# Patient Record
Sex: FEMALE | Race: WHITE | NOT HISPANIC OR LATINO | Employment: UNEMPLOYED | URBAN - METROPOLITAN AREA
[De-identification: names, ages, dates, MRNs, and addresses within clinical notes are randomized per-mention and may not be internally consistent; named-entity substitution may affect disease eponyms.]

---

## 2017-02-16 ENCOUNTER — LAB VISIT (OUTPATIENT)
Dept: LAB | Facility: HOSPITAL | Age: 62
End: 2017-02-16
Attending: FAMILY MEDICINE
Payer: MEDICAID

## 2017-02-16 ENCOUNTER — OFFICE VISIT (OUTPATIENT)
Dept: INTERNAL MEDICINE | Facility: CLINIC | Age: 62
End: 2017-02-16
Payer: MEDICAID

## 2017-02-16 VITALS
RESPIRATION RATE: 16 BRPM | OXYGEN SATURATION: 98 % | HEIGHT: 64 IN | WEIGHT: 135.38 LBS | HEART RATE: 68 BPM | SYSTOLIC BLOOD PRESSURE: 166 MMHG | TEMPERATURE: 97 F | DIASTOLIC BLOOD PRESSURE: 88 MMHG | BODY MASS INDEX: 23.11 KG/M2

## 2017-02-16 DIAGNOSIS — M79.671 FOOT PAIN, BILATERAL: ICD-10-CM

## 2017-02-16 DIAGNOSIS — I10 ESSENTIAL HYPERTENSION: ICD-10-CM

## 2017-02-16 DIAGNOSIS — I10 ESSENTIAL HYPERTENSION: Primary | ICD-10-CM

## 2017-02-16 DIAGNOSIS — Z12.39 BREAST CANCER SCREENING: ICD-10-CM

## 2017-02-16 DIAGNOSIS — E78.2 MIXED HYPERLIPIDEMIA: ICD-10-CM

## 2017-02-16 DIAGNOSIS — M79.672 FOOT PAIN, BILATERAL: ICD-10-CM

## 2017-02-16 DIAGNOSIS — Z72.0 TOBACCO ABUSE: ICD-10-CM

## 2017-02-16 DIAGNOSIS — Z12.11 COLON CANCER SCREENING: ICD-10-CM

## 2017-02-16 LAB
ALBUMIN SERPL BCP-MCNC: 4.1 G/DL
ALP SERPL-CCNC: 89 U/L
ALT SERPL W/O P-5'-P-CCNC: 23 U/L
ANION GAP SERPL CALC-SCNC: 10 MMOL/L
AST SERPL-CCNC: 36 U/L
BASOPHILS # BLD AUTO: 0.02 K/UL
BASOPHILS NFR BLD: 0.3 %
BILIRUB SERPL-MCNC: 0.4 MG/DL
BUN SERPL-MCNC: 11 MG/DL
CALCIUM SERPL-MCNC: 9.6 MG/DL
CHLORIDE SERPL-SCNC: 102 MMOL/L
CHOLEST/HDLC SERPL: 5 {RATIO}
CO2 SERPL-SCNC: 27 MMOL/L
CREAT SERPL-MCNC: 0.8 MG/DL
DIFFERENTIAL METHOD: NORMAL
EOSINOPHIL # BLD AUTO: 0.2 K/UL
EOSINOPHIL NFR BLD: 3.2 %
ERYTHROCYTE [DISTWIDTH] IN BLOOD BY AUTOMATED COUNT: 13.3 %
EST. GFR  (AFRICAN AMERICAN): >60 ML/MIN/1.73 M^2
EST. GFR  (NON AFRICAN AMERICAN): >60 ML/MIN/1.73 M^2
GLUCOSE SERPL-MCNC: 95 MG/DL
HCT VFR BLD AUTO: 41.4 %
HDL/CHOLESTEROL RATIO: 20.1 %
HDLC SERPL-MCNC: 288 MG/DL
HDLC SERPL-MCNC: 58 MG/DL
HGB BLD-MCNC: 14.5 G/DL
LDLC SERPL CALC-MCNC: 173.2 MG/DL
LYMPHOCYTES # BLD AUTO: 2.8 K/UL
LYMPHOCYTES NFR BLD: 38.1 %
MCH RBC QN AUTO: 31 PG
MCHC RBC AUTO-ENTMCNC: 35 %
MCV RBC AUTO: 89 FL
MONOCYTES # BLD AUTO: 0.5 K/UL
MONOCYTES NFR BLD: 6.2 %
NEUTROPHILS # BLD AUTO: 3.9 K/UL
NEUTROPHILS NFR BLD: 52.1 %
NONHDLC SERPL-MCNC: 230 MG/DL
PLATELET # BLD AUTO: 212 K/UL
PMV BLD AUTO: 10.2 FL
POTASSIUM SERPL-SCNC: 3.9 MMOL/L
PROT SERPL-MCNC: 8.1 G/DL
RBC # BLD AUTO: 4.67 M/UL
SODIUM SERPL-SCNC: 139 MMOL/L
TRIGL SERPL-MCNC: 284 MG/DL
WBC # BLD AUTO: 7.45 K/UL

## 2017-02-16 PROCEDURE — 83036 HEMOGLOBIN GLYCOSYLATED A1C: CPT

## 2017-02-16 PROCEDURE — 99214 OFFICE O/P EST MOD 30 MIN: CPT | Mod: S$PBB,,, | Performed by: FAMILY MEDICINE

## 2017-02-16 PROCEDURE — 86803 HEPATITIS C AB TEST: CPT

## 2017-02-16 PROCEDURE — 80061 LIPID PANEL: CPT

## 2017-02-16 PROCEDURE — 85025 COMPLETE CBC W/AUTO DIFF WBC: CPT

## 2017-02-16 PROCEDURE — 99999 PR PBB SHADOW E&M-EST. PATIENT-LVL III: CPT | Mod: PBBFAC,,, | Performed by: FAMILY MEDICINE

## 2017-02-16 PROCEDURE — 36415 COLL VENOUS BLD VENIPUNCTURE: CPT | Mod: PO

## 2017-02-16 PROCEDURE — 80053 COMPREHEN METABOLIC PANEL: CPT

## 2017-02-16 RX ORDER — CHOLECALCIFEROL (VITAMIN D3) 25 MCG
1000 TABLET ORAL DAILY
COMMUNITY
End: 2018-02-26

## 2017-02-16 RX ORDER — LANOLIN ALCOHOL/MO/W.PET/CERES
100 CREAM (GRAM) TOPICAL DAILY
COMMUNITY
End: 2018-01-10

## 2017-02-16 RX ORDER — AMLODIPINE AND BENAZEPRIL HYDROCHLORIDE 5; 20 MG/1; MG/1
1 CAPSULE ORAL DAILY
Qty: 90 CAPSULE | Refills: 3 | Status: SHIPPED | OUTPATIENT
Start: 2017-02-16 | End: 2017-08-16

## 2017-02-16 NOTE — PROGRESS NOTES
Chief Complaint:    Chief Complaint   Patient presents with    Annual Exam       History of Present Illness:  She presents to the following complaints,  .  Bilateral foot pain got better when she change her foot wear but is still present.  Her blood pressure is elevated on recheck it continues to stay high.  She has hyperlipidemia significant statin intolerance.    She does smoke some although she's cutting back does not want to go for smoking cessation class nor want to take any medication.      ROS:  Review of Systems   Constitutional: Negative for activity change, chills, fatigue, fever and unexpected weight change.   HENT: Negative for congestion, ear discharge, ear pain, hearing loss, postnasal drip and rhinorrhea.    Eyes: Negative for pain and visual disturbance.   Respiratory: Negative for cough, chest tightness and shortness of breath.    Cardiovascular: Negative for chest pain and palpitations.   Gastrointestinal: Negative for abdominal pain, diarrhea and vomiting.   Endocrine: Negative for heat intolerance.   Genitourinary: Negative for dysuria, flank pain, frequency and hematuria.   Musculoskeletal: Negative for back pain, gait problem and neck pain.   Skin: Negative for color change and rash.   Neurological: Negative for dizziness, tremors, seizures, numbness and headaches.   Psychiatric/Behavioral: Negative for agitation, hallucinations, self-injury, sleep disturbance and suicidal ideas. The patient is not nervous/anxious.        Past Medical History   Diagnosis Date    Arthritis     Hyperlipidemia     Hypertension     Osteoporosis        Social History:  Social History     Social History    Marital status:      Spouse name: N/A    Number of children: N/A    Years of education: N/A     Social History Main Topics    Smoking status: Current Every Day Smoker     Packs/day: 0.50     Years: 45.00     Types: Cigarettes    Smokeless tobacco: Never Used    Alcohol use No    Drug use: No  "   Sexual activity: No     Other Topics Concern    None     Social History Narrative    None       Family History:   family history is not on file.    Health Maintenance   Topic Date Due    Hepatitis C Screening  1955    TETANUS VACCINE  07/19/1975    Pneumococcal PPSV23 (Medium Risk) (1) 07/19/1975    Colonoscopy  07/19/2007    Influenza Vaccine  08/01/2016    Mammogram  12/17/2017    Pap Smear  06/18/2018    Lipid Panel  03/16/2020       Physical Exam:    Vital Signs  Temp: 96.8 °F (36 °C)  Temp src: Tympanic  Pulse: 68  Resp: 16  SpO2: 98 %  BP: (!) 166/88  BP Location: Left arm  BP Method: Manual  Patient Position: Sitting  Pain Score:   7  Height and Weight  Height: 5' 4" (162.6 cm)  Weight: 61.4 kg (135 lb 5.8 oz)  BSA (Calculated - sq m): 1.66 sq meters  BMI (Calculated): 23.3  Weight in (lb) to have BMI = 25: 145.3]    Body mass index is 23.23 kg/(m^2).    Physical Exam   Constitutional: She is oriented to person, place, and time. She appears well-developed.   HENT:   Mouth/Throat: Oropharynx is clear and moist.   Eyes: Conjunctivae are normal. Pupils are equal, round, and reactive to light.   Neck: Normal range of motion. Neck supple.   Cardiovascular: Normal rate, regular rhythm and normal heart sounds.    No murmur heard.  Pulmonary/Chest: Effort normal and breath sounds normal. No respiratory distress. She has no wheezes. She has no rales. She exhibits no tenderness.   Abdominal: Soft. She exhibits no distension and no mass. There is no tenderness. There is no guarding.   Musculoskeletal: She exhibits no edema or tenderness.        Feet:    Lymphadenopathy:     She has no cervical adenopathy.   Neurological: She is alert and oriented to person, place, and time. She has normal reflexes.   Skin: Skin is warm and dry.   Psychiatric: She has a normal mood and affect. Her behavior is normal. Judgment and thought content normal.       Lab Results   Component Value Date    CHOL 238 (H) " 03/16/2015    CHOL 256 (H) 04/08/2011    CHOL 280 (H) 05/11/2010    TRIG 175 (H) 03/16/2015    TRIG 134 04/08/2011    TRIG 99 05/11/2010    HDL 57 03/16/2015    HDL 53 04/08/2011    HDL 51 05/11/2010    TOTALCHOLEST 4.2 03/16/2015    TOTALCHOLEST 4.8 04/08/2011    TOTALCHOLEST 5.5 (H) 05/11/2010    NONHDLCHOL 181 03/16/2015       Lab Results   Component Value Date    HGBA1C 5.1 03/16/2015       Assessment:      ICD-10-CM ICD-9-CM   1. Essential hypertension I10 401.9   2. Mixed hyperlipidemia E78.2 272.2   3. Tobacco abuse Z72.0 305.1   4. Foot pain, bilateral M79.671 729.5    M79.672    5. Colon cancer screening Z12.11 V76.51   6. Breast cancer screening Z12.39 V76.10         Plan:  1.  Hypertension poor control and add amlodipine benazepril please buy a blood pressure machine start monitoring pressure every day bring the machine next visit and start an exercise program-diet is recommended.  2.  Hyperlipidemia we'll look into alternate drugs because of her statin intolerance  3.  Tobaccoism smoking cessation advised patient to work on her own refusing any help from us  4.  Foot pain and refer to podiatry  5.  Schedule a screening mammogram and send another request for colonoscopy.  Follow-up in one month.  Orders Placed This Encounter   Procedures    Mammo Digital Screening Bilat with CAD    CBC auto differential    Comprehensive metabolic panel    Hemoglobin A1c    Lipid panel    Hepatitis C antibody    Ambulatory Referral to Podiatry       Current Outpatient Prescriptions   Medication Sig Dispense Refill    citalopram (CELEXA) 40 MG tablet ONE BY MOUTH EVERY DAY 30 tablet 5    cyanocobalamin (VITAMIN B-12) 1000 MCG tablet Take 100 mcg by mouth once daily.      metoprolol succinate (TOPROL-XL) 25 MG 24 hr tablet TAKE ONE-HALF TABLET BY MOUTH ONCE DAILY 75 tablet 2    potassium chloride (MICRO-K) 10 MEQ CpSR Take 1 capsule (10 mEq total) by mouth once daily. 30 capsule 6     triamterene-hydrochlorothiazide 37.5-25 mg (MAXZIDE-25) 37.5-25 mg per tablet TAKE ONE-HALF TABLET BY MOUTH ONCE DAILY 45 tablet 3    vitamin D 1000 units Tab Take 1,000 mg by mouth once daily.      amlodipine-benazepril 5-20 mg (LOTREL) 5-20 mg per capsule Take 1 capsule by mouth once daily. 90 capsule 3     No current facility-administered medications for this visit.        Medications Discontinued During This Encounter   Medication Reason    nabumetone (RELAFEN) 500 MG tablet Patient no longer taking    SUPREP BOWEL PREP KIT 17.5-3.13-1.6 gram SolR Therapy completed       Return in about 1 month (around 3/16/2017).      Dr Alonso Cortez MD    Disclaimer: This note is prepared using voice recognition system and as such is likely to have errors and is not proof read.

## 2017-02-16 NOTE — MR AVS SNAPSHOT
[facility]  [address] [place] [id number]  Phone: [phone]                  Emily Ordaz   2017 10:00 AM   Office Visit    Description:  Female : 1955   Provider:  Alonso Cortez MD   Department:  [facility]           Reason for Visit     Annual Exam           Diagnoses this Visit        Comments    Essential hypertension    -  Primary     Mixed hyperlipidemia         Tobacco abuse         Foot pain, bilateral         Colon cancer screening         Breast cancer screening                To Do List           Future Appointments        Provider Department Dept Phone    2017 10:20 AM [facility] MAM[facility] [phone]    3/13/2017 9:40 AM Cici Jiang DPM [facility] [phone]      Goals (5 Years of Data)     None      Follow-Up and Disposition     Return in about 1 month (around 3/16/2017).    Follow-up and Disposition History       These Medications        Disp Refills Start End    amlodipine-benazepril 5-20 mg (LOTREL) 5-20 mg per capsule 90 capsule 3 2017    Take 1 capsule by mouth once daily. - Oral    Pharmacy: [facility], [address] #: [phone]         [facility]     [facility] -  Assistance  Registered nurses in the [facility] provide clinical advisement, health education, appointment booking, and other advisory services.  Call for this free service at [phone].             Medications           Message regarding Medications     Verify the changes and/or additions to your medication regime listed below are the same as discussed with your clinician today.  If any of these changes or additions are incorrect, please notify your healthcare provider.        START taking these NEW medications        Refills    amlodipine-benazepril 5-20 mg (LOTREL) 5-20 mg per capsule 3    Sig: Take 1  "capsule by mouth once daily.    Class: Normal    Route: Oral      STOP taking these medications     nabumetone (RELAFEN) 500 MG tablet Take 1 tablet (500 mg total) by mouth 2 (two) times daily.    SUPREP BOWEL PREP KIT 17.5-3.13-1.6 gram SolR Use as directed           Verify that the below list of medications is an accurate representation of the medications you are currently taking.  If none reported, the list may be blank. If incorrect, please contact your healthcare provider. Carry this list with you in case of emergency.           Current Medications     citalopram (CELEXA) 40 MG tablet ONE BY MOUTH EVERY DAY    cyanocobalamin (VITAMIN B-12) 1000 MCG tablet Take 100 mcg by mouth once daily.    metoprolol succinate (TOPROL-XL) 25 MG 24 hr tablet TAKE ONE-HALF TABLET BY MOUTH ONCE DAILY    potassium chloride (MICRO-K) 10 MEQ CpSR Take 1 capsule (10 mEq total) by mouth once daily.    triamterene-hydrochlorothiazide 37.5-25 mg (MAXZIDE-25) 37.5-25 mg per tablet TAKE ONE-HALF TABLET BY MOUTH ONCE DAILY    vitamin D 1000 units Tab Take 1,000 mg by mouth once daily.    amlodipine-benazepril 5-20 mg (LOTREL) 5-20 mg per capsule Take 1 capsule by mouth once daily.           Clinical Reference Information           Your Vitals Were     BP Pulse Temp Resp Height Weight    166/88 (BP Location: Left arm, Patient Position: Sitting, BP Method: Manual) 68 96.8 °F (36 °C) (Tympanic) 16 5' 4" (1.626 m) 61.4 kg (135 lb 5.8 oz)    Last Period SpO2 BMI          03/16/1997 (LMP Unknown) 98% 23.23 kg/m2        Blood Pressure          Most Recent Value    BP  (!)  166/88      Allergies as of 2/16/2017     Statins-hmg-coa Reductase Inhibitors      Immunizations Administered on Date of Encounter - 2/16/2017     None      Orders Placed During Today's Visit      Normal Orders This Visit    Ambulatory Referral to Podiatry     Case request GI: COLONOSCOPY     Future Labs/Procedures Expected by Expires    CBC auto differential  2/16/2017 " (Approximate) 3/18/2017    Comprehensive metabolic panel  2/16/2017 (Approximate) 4/17/2018    Hemoglobin A1c  2/16/2017 (Approximate) 4/17/2018    Hepatitis C antibody  2/16/2017 4/17/2018    Lipid panel  2/16/2017 (Approximate) 4/17/2017    Mammo Digital Screening Bilat with CAD  2/16/2017 (Approximate) 4/18/2018      Smoking Cessation     If you would like to quit smoking:   You may be eligible for free services if you are a [place] resident and started smoking cigarettes before September 1, 1988.  Call the Smoking Cessation [employer] (SCT) toll free at (369) [phone] or (162) 874-6839.   Call 3-800-QUIT-NOW if you do not meet the above criteria.            Language Assistance Services     ATTENTION: Language assistance services are available, free of charge. Please call [phone].      ATENCIÓN: Si habla español, tiene a boston disposición servicios gratuitos de asistencia lingüística. Llame al [phone].     CHÚ Ý: N?u b?n nói Ti?ng Vi?t, có các d?ch v? h? tr? ngôn ng? mi?n phí dành cho b?n. G?i s? [phone].         [facility] complies with applicable Federal civil rights laws and does not discriminate on the basis of race, color, national origin, age, disability, or sex.

## 2017-02-17 LAB
ESTIMATED AVG GLUCOSE: 100 MG/DL
HBA1C MFR BLD HPLC: 5.1 %
HCV AB SERPL QL IA: NEGATIVE

## 2017-02-28 RX ORDER — POTASSIUM CHLORIDE 750 MG/1
CAPSULE, EXTENDED RELEASE ORAL
Qty: 30 CAPSULE | Refills: 3 | Status: SHIPPED | OUTPATIENT
Start: 2017-02-28 | End: 2017-06-29 | Stop reason: SDUPTHER

## 2017-03-20 DIAGNOSIS — M79.671 BILATERAL FOOT PAIN: Primary | ICD-10-CM

## 2017-03-20 DIAGNOSIS — M79.672 BILATERAL FOOT PAIN: Primary | ICD-10-CM

## 2017-03-21 ENCOUNTER — OFFICE VISIT (OUTPATIENT)
Dept: PODIATRY | Facility: CLINIC | Age: 62
End: 2017-03-21
Payer: MEDICAID

## 2017-03-21 ENCOUNTER — HOSPITAL ENCOUNTER (OUTPATIENT)
Dept: RADIOLOGY | Facility: HOSPITAL | Age: 62
Discharge: HOME | End: 2017-03-21
Attending: PODIATRIST
Payer: MEDICAID

## 2017-03-21 VITALS
DIASTOLIC BLOOD PRESSURE: 63 MMHG | HEIGHT: 64 IN | SYSTOLIC BLOOD PRESSURE: 115 MMHG | HEART RATE: 76 BPM | BODY MASS INDEX: 22.47 KG/M2 | WEIGHT: 131.63 LBS

## 2017-03-21 DIAGNOSIS — M79.671 BILATERAL FOOT PAIN: ICD-10-CM

## 2017-03-21 DIAGNOSIS — M79.672 BILATERAL FOOT PAIN: ICD-10-CM

## 2017-03-21 DIAGNOSIS — M79.671 RIGHT FOOT PAIN: ICD-10-CM

## 2017-03-21 DIAGNOSIS — M19.079 ARTHRITIS, MIDFOOT: Primary | ICD-10-CM

## 2017-03-21 PROCEDURE — 99213 OFFICE O/P EST LOW 20 MIN: CPT | Mod: PBBFAC,PO | Performed by: PODIATRIST

## 2017-03-21 PROCEDURE — 99203 OFFICE O/P NEW LOW 30 MIN: CPT | Mod: S$PBB,,, | Performed by: PODIATRIST

## 2017-03-21 PROCEDURE — 73630 X-RAY EXAM OF FOOT: CPT | Mod: 26,50,, | Performed by: RADIOLOGY

## 2017-03-21 PROCEDURE — 99999 PR PBB SHADOW E&M-EST. PATIENT-LVL III: CPT | Mod: PBBFAC,,, | Performed by: PODIATRIST

## 2017-03-21 RX ORDER — METHYLPREDNISOLONE 4 MG/1
TABLET ORAL
Qty: 1 PACKAGE | Refills: 0 | Status: SHIPPED | OUTPATIENT
Start: 2017-03-21 | End: 2017-04-11

## 2017-03-21 NOTE — LETTER
March 21, 2017      Alonso Cortez MD  [id number] [address] [place] [address]           [facility]  [address] [place] [address]  Phone: [phone]  Fax: [phone]          Patient: Emily Ordaz   MR Number: [id number]   YOB: 1955   Date of Visit: 3/21/2017       Dear Dr. Alonso Cortez:    Thank you for referring Emily Ordaz to me for evaluation. Attached you will find relevant portions of my assessment and plan of care.    If you have questions, please do not hesitate to call me. I look forward to following Emily Ordaz along with you.    Sincerely,    Shirley Henry DPM    Enclosure  CC:  No Recipients    If you would like to receive this communication electronically, please contact [email or web address] or [phone] to request more information on [employer] Link access.    For providers and/or their staff who would like to refer a patient to [facility], please contact us through our one-stop-shop provider referral line, [facility], at [phone].    If you feel you have received this communication in error or would no longer like to receive these types of communications, please e-mail [email or web address]

## 2017-03-21 NOTE — PROGRESS NOTES
PODIATRY NOTE  PCP: Dr. Alonso Cortez MD    CHIEF COMPLAINT   Chief Complaint   Patient presents with    Ankle Pain     Bilateral ankle pain. Right ankle is worse than left typically during ambulation       HPI  Emily Ordaz is a 61 y.o. female who has a past medical history of Arthritis; Hyperlipidemia; Hypertension; and Osteoporosis.   Emily presents to clinic today complaining of right ankle pain    Patient describes pain as:   Location: pt points around entire ankle, RIGHT and points at navicular bone  Quality: achy  Severity:10/10  Duration: several years  Modifying Factors (Aggravating): walking, weight bearing, coming down stairs.  Modifying Factors (Alleviating): aleve, non weight bearing    She states this is a chronic problem. She has seen Dr. Negrete in the past for this ankle pain. She states pain is primarily with ambulation. She wears tennis shoes to help with the pain.    Patient denies other pedal complaints at this time.      PMH  Past Medical History:   Diagnosis Date    Arthritis     Hyperlipidemia     Hypertension     Osteoporosis        PROBLEM LIST  Patient Active Problem List    Diagnosis Date Noted    HTN (hypertension) 03/16/2015    Hyperlipidemia 03/16/2015    Statin intolerance 03/16/2015       MEDS  Current Outpatient Prescriptions on File Prior to Visit   Medication Sig Dispense Refill    citalopram (CELEXA) 40 MG tablet ONE BY MOUTH EVERY DAY 30 tablet 5    cyanocobalamin (VITAMIN B-12) 1000 MCG tablet Take 100 mcg by mouth once daily.      metoprolol succinate (TOPROL-XL) 25 MG 24 hr tablet TAKE ONE-HALF TABLET BY MOUTH ONCE DAILY 75 tablet 2    potassium chloride (MICRO-K) 10 MEQ CpSR TAKE ONE CAPSULE BY MOUTH EVERY DAY 30 capsule 3    triamterene-hydrochlorothiazide 37.5-25 mg (MAXZIDE-25) 37.5-25 mg per tablet TAKE ONE-HALF TABLET BY MOUTH ONCE DAILY 45 tablet 3    vitamin D 1000 units Tab Take 1,000 mg by mouth once daily.      amlodipine-benazepril 5-20 mg  "(LOTREL) 5-20 mg per capsule Take 1 capsule by mouth once daily. 90 capsule 3     No current facility-administered medications on file prior to visit.        Medication List with Changes/Refills   New Medications    METHYLPREDNISOLONE (MEDROL DOSEPACK) 4 MG TABLET    use as directed   Current Medications    AMLODIPINE-BENAZEPRIL 5-20 MG (LOTREL) 5-20 MG PER CAPSULE    Take 1 capsule by mouth once daily.    CITALOPRAM (CELEXA) 40 MG TABLET    ONE BY MOUTH EVERY DAY    CYANOCOBALAMIN (VITAMIN B-12) 1000 MCG TABLET    Take 100 mcg by mouth once daily.    METOPROLOL SUCCINATE (TOPROL-XL) 25 MG 24 HR TABLET    TAKE ONE-HALF TABLET BY MOUTH ONCE DAILY    POTASSIUM CHLORIDE (MICRO-K) 10 MEQ CPSR    TAKE ONE CAPSULE BY MOUTH EVERY DAY    TRIAMTERENE-HYDROCHLOROTHIAZIDE 37.5-25 MG (MAXZIDE-25) 37.5-25 MG PER TABLET    TAKE ONE-HALF TABLET BY MOUTH ONCE DAILY    VITAMIN D 1000 UNITS TAB    Take 1,000 mg by mouth once daily.       PSH   History reviewed. No pertinent surgical history.     ALL  Review of patient's allergies indicates:   Allergen Reactions    Statins-hmg-coa reductase inhibitors      Leg cramps and pain        SOC     Social History   Substance Use Topics    Smoking status: Current Every Day Smoker     Packs/day: 0.50     Years: 45.00     Types: Cigarettes    Smokeless tobacco: Never Used    Alcohol use No         FAMILY HX  History reviewed. No pertinent family history.         REVIEW OF SYSTEMS  General: Denies any fever or chills  Chest: Denies shortness of breath, wheezing, coughing, or sputum production  Heart: Denies chest pain, cold extremities, orthopenia, or reduced exercise tolerance  As noted above and per history of current illness above, otherwise negative in the remainder of the 14 systems.     PHYSICAL EXAM  Vitals:    03/21/17 0953   BP: 115/63   Pulse: 76   Weight: 59.7 kg (131 lb 9.8 oz)   Height: 5' 4" (1.626 m)   PainSc: 0-No pain       General: This patient is well-developed, " well-nourished and appears stated age, well-oriented to person, place and time, and cooperative and pleasant on today's visit      LOWER EXTREMITY  Vascular exam:   · Dorsalis pedis and posterior tibial pulses palpable 2/4 bilaterally.   · Capillary refill time immediate to the toes.   · Feet are warm to the touch. Skin temperature warm to warm from proximally to distally   · There are varicosities, telangiectasias noted to bilateral foot and ankle regions.   · There are no ecchymoses noted to bilateral foot and ankle regions.   · There is no gross lower extremity edema.    Dermatologic exam:   · Skin moist with healthy texture and turgor.  · There are no open ulcerations, lacerations, or fissures to bilateral foot and ankle regions. There are no signs of infection as there is no erythema, no proximal-extending lymphangiitis, no fluctuance, or crepitus noted on palpation to bilateral foot and ankle regions.   · There is no interdigital maceration.   · There are no hyperkeratotic lesions noted to feet. Nails are well-trimmed.    Neurologic exam:  · Epicritic sensation is intact as the patient is able to sense light touch to bilateral foot and ankle regions.   · Achilles and patellar deep tendon reflexes intact  · Babinski reflex absent    Musculoskeletal/Orthopedic exam:   · No symptomatic structural abnormalities noted  · There is PAIN with palpation of navicular tuberosity and with palpation of medial malleolus RIGHT   · Muscle strength AT/EHL/EDL/PT: 5/5; Achilles/Gastroc/Soleus: 5/5; PB/PL: 5/5 Muscle tone is normal.  · Ankle joint ROM  B/L supple DF/PF, non-crepitus  · STJ ROM supple inv/ev, non crepitus   · MTPJ b/l supple DF/PF, non crepitus  · Foot type: pes cavus     IMAGING   Reviewed by me and I agree with radiologist findings, 3 views of foot/ankle, reveal:  Results for orders placed during the hospital encounter of 03/21/17   X-Ray Foot Complete Bilateral    Narrative Bilateral feet three  views.    Findings: There is diffuse bony demineralization.  Mild degenerative changes present in the mid foot region bilaterally.  Small dorsal calcaneal spur on the left.  No periarticular erosions.  No acute fracture or dislocation.    Impression  As above.      Electronically signed by: DARWIN BENTON MD  Date:     03/21/17  Time:    09:28            Results for orders placed during the hospital encounter of 03/21/17   X-Ray Foot Complete Bilateral    Narrative Bilateral feet three views.    Findings: There is diffuse bony demineralization.  Mild degenerative changes present in the mid foot region bilaterally.  Small dorsal calcaneal spur on the left.  No periarticular erosions.  No acute fracture or dislocation.    Impression  As above.      Electronically signed by: DARWIN BENTON MD  Date:     03/21/17  Time:    09:28        No results found for this or any previous visit.     No results found for this or any previous visit.        ASSESSMENT  Arthritis, midfoot    Right foot pain    Other orders  -     methylPREDNISolone (MEDROL DOSEPACK) 4 mg tablet; use as directed  Dispense: 1 Package; Refill: 0      PLAN    1. Patient was educated about clinical and imaging findings, and verbalizes understanding of above.  2. Treatment plan: Discussed xray findings which I reviewed and went over with the patient. Clinically pain stems from medial aspect of foot around navicular and symptoms worse with ambulation therefore first treatment goal aimed at accommodating pedal deformity during ambulation. Rx for inserts and shoe recommendation provided. Medrol dosepak Rx for inflammation  -Prescription for musculoskeletal pain prescribed for patient consisting of: Flurbiprofen 10%, cyclobenzaprine 2%,gabapentin 6%, lidocaine 2%, and prilocaine 2%.    Arthritis: diclofenac2%, amitriptyline 2%, lidocaine 2%, and prilocaine 2%.      3. RTC  for follow up/evaluation as scheduled       Future Appointments  Date Time Provider  Department Center   4/18/2017 9:40 AM Shirley Henry DPM [facility] POD [facility]       Report Electronically Signed By:  Shirley Henry DPM   Podiatric Medicine & Surgery  [facility]  3/21/2017

## 2017-03-21 NOTE — MR AVS SNAPSHOT
[facility]  [address] [facility] [place] [address]  Phone: [phone]  Fax: [phone]                  Emily Ordaz   3/21/2017 9:40 AM   Office Visit    Description:  Female : 1955   Provider:  Shirley Henry DPM   Department:  [facility]           Reason for Visit     Ankle Pain           Diagnoses this Visit        Comments    Arthritis, midfoot    -  Primary     Right foot pain                To Do List           Future Appointments        Provider Department Dept Phone    2017 9:40 AM Shirley Henry DPM [facility] [phone]      Goals (5 Years of Data)     None       These Medications        Disp Refills Start End    methylPREDNISolone (MEDROL DOSEPACK) 4 mg tablet 1 Package 0 3/21/2017 2017    use as directed    Pharmacy: [facility] [address] #: [phone]         [facility] On Call     [facility] On Call Nurse Care Line -  Assistance  Registered nurses in the [facility] On Call Center provide clinical advisement, health education, appointment booking, and other advisory services.  Call for this free service at [phone].             Medications           Message regarding Medications     Verify the changes and/or additions to your medication regime listed below are the same as discussed with your clinician today.  If any of these changes or additions are incorrect, please notify your healthcare provider.        START taking these NEW medications        Refills    methylPREDNISolone (MEDROL DOSEPACK) 4 mg tablet 0    Sig: use as directed    Class: Normal           Verify that the below list of medications is an accurate representation of the medications you are currently taking.  If none reported, the list may be blank. If incorrect, please contact your healthcare provider. Carry this list with you in case of emergency.           Current Medications     citalopram (CELEXA) 40 MG tablet ONE BY MOUTH  "EVERY DAY    cyanocobalamin (VITAMIN B-12) 1000 MCG tablet Take 100 mcg by mouth once daily.    metoprolol succinate (TOPROL-XL) 25 MG 24 hr tablet TAKE ONE-HALF TABLET BY MOUTH ONCE DAILY    potassium chloride (MICRO-K) 10 MEQ CpSR TAKE ONE CAPSULE BY MOUTH EVERY DAY    triamterene-hydrochlorothiazide 37.5-25 mg (MAXZIDE-25) 37.5-25 mg per tablet TAKE ONE-HALF TABLET BY MOUTH ONCE DAILY    vitamin D 1000 units Tab Take 1,000 mg by mouth once daily.    amlodipine-benazepril 5-20 mg (LOTREL) 5-20 mg per capsule Take 1 capsule by mouth once daily.    methylPREDNISolone (MEDROL DOSEPACK) 4 mg tablet use as directed           Clinical Reference Information           Your Vitals Were     BP Pulse Height Weight Last Period BMI    115/63 (BP Location: Right arm, Patient Position: Sitting, BP Method: Automatic) 76 5' 4" (1.626 m) 59.7 kg (131 lb 9.8 oz) 03/16/1997 (LMP Unknown) 22.59 kg/m2      Blood Pressure          Most Recent Value    BP  115/63      Allergies as of 3/21/2017     Statins-hmg-coa Reductase Inhibitors      Immunizations Administered on Date of Encounter - 3/21/2017     None      Smoking Cessation     If you would like to quit smoking:   You may be eligible for free services if you are a [place] resident and started smoking cigarettes before September 1, 1988.  Call the Smoking Cessation Trust ([facility]) toll free at ([phone]) [phone] or ([phone].   Call 1-800-QUIT-NOW if you do not meet the above criteria.            Language Assistance Services     ATTENTION: Language assistance services are available, free of charge. Please call [phone].      ATENCIÓN: Si habla español, tiene a boston disposición servicios gratuitos de asistencia lingüística. Llame al [phone].     YULIANA Ý: N?u b?n nói Ti?ng Vi?t, có các d?ch v? h? tr? ngôn ng? mi?n phí dành cho b?n. G?i s? [phone].         [facility] complies with applicable Federal civil rights laws and does not discriminate on the basis of race, " color, national origin, age, disability, or sex.

## 2017-06-12 RX ORDER — CITALOPRAM 40 MG/1
TABLET, FILM COATED ORAL
Qty: 30 TABLET | Refills: 4 | Status: SHIPPED | OUTPATIENT
Start: 2017-06-12 | End: 2017-11-13 | Stop reason: SDUPTHER

## 2017-06-29 RX ORDER — POTASSIUM CHLORIDE 750 MG/1
CAPSULE, EXTENDED RELEASE ORAL
Qty: 30 CAPSULE | Refills: 0 | Status: SHIPPED | OUTPATIENT
Start: 2017-06-29 | End: 2017-08-16 | Stop reason: SDUPTHER

## 2017-07-14 RX ORDER — TRIAMTERENE/HYDROCHLOROTHIAZID 37.5-25 MG
TABLET ORAL
Qty: 45 TABLET | Refills: 0 | Status: SHIPPED | OUTPATIENT
Start: 2017-07-14 | End: 2017-08-16 | Stop reason: SDUPTHER

## 2017-07-19 RX ORDER — METOPROLOL SUCCINATE 25 MG/1
TABLET, EXTENDED RELEASE ORAL
Qty: 75 TABLET | Refills: 2 | Status: SHIPPED | OUTPATIENT
Start: 2017-07-19 | End: 2018-07-25 | Stop reason: SDUPTHER

## 2017-08-16 ENCOUNTER — OFFICE VISIT (OUTPATIENT)
Dept: FAMILY MEDICINE | Facility: CLINIC | Age: 62
End: 2017-08-16
Payer: MEDICAID

## 2017-08-16 VITALS
HEART RATE: 82 BPM | HEIGHT: 64 IN | BODY MASS INDEX: 22.85 KG/M2 | TEMPERATURE: 98 F | OXYGEN SATURATION: 96 % | SYSTOLIC BLOOD PRESSURE: 112 MMHG | DIASTOLIC BLOOD PRESSURE: 70 MMHG | WEIGHT: 133.81 LBS

## 2017-08-16 DIAGNOSIS — I10 ESSENTIAL HYPERTENSION: Primary | ICD-10-CM

## 2017-08-16 DIAGNOSIS — Z12.11 COLON CANCER SCREENING: ICD-10-CM

## 2017-08-16 DIAGNOSIS — E78.2 MIXED HYPERLIPIDEMIA: ICD-10-CM

## 2017-08-16 DIAGNOSIS — Z72.0 TOBACCO ABUSE: ICD-10-CM

## 2017-08-16 DIAGNOSIS — Z78.9 STATIN INTOLERANCE: ICD-10-CM

## 2017-08-16 PROCEDURE — 99214 OFFICE O/P EST MOD 30 MIN: CPT | Mod: S$PBB,,, | Performed by: FAMILY MEDICINE

## 2017-08-16 PROCEDURE — 99213 OFFICE O/P EST LOW 20 MIN: CPT | Mod: PBBFAC,PO | Performed by: FAMILY MEDICINE

## 2017-08-16 PROCEDURE — 3008F BODY MASS INDEX DOCD: CPT | Mod: ,,, | Performed by: FAMILY MEDICINE

## 2017-08-16 PROCEDURE — 3078F DIAST BP <80 MM HG: CPT | Mod: ,,, | Performed by: FAMILY MEDICINE

## 2017-08-16 PROCEDURE — 3074F SYST BP LT 130 MM HG: CPT | Mod: ,,, | Performed by: FAMILY MEDICINE

## 2017-08-16 PROCEDURE — 99999 PR PBB SHADOW E&M-EST. PATIENT-LVL III: CPT | Mod: PBBFAC,,, | Performed by: FAMILY MEDICINE

## 2017-08-16 RX ORDER — POTASSIUM CHLORIDE 750 MG/1
10 CAPSULE, EXTENDED RELEASE ORAL DAILY
Qty: 30 CAPSULE | Refills: 6 | Status: SHIPPED | OUTPATIENT
Start: 2017-08-16 | End: 2018-03-10 | Stop reason: SDUPTHER

## 2017-08-16 RX ORDER — TRIAMTERENE/HYDROCHLOROTHIAZID 37.5-25 MG
0.5 TABLET ORAL DAILY
Qty: 45 TABLET | Refills: 6 | Status: SHIPPED | OUTPATIENT
Start: 2017-08-16 | End: 2018-08-24 | Stop reason: SDUPTHER

## 2017-08-16 NOTE — PROGRESS NOTES
Chief Complaint:    Chief Complaint   Patient presents with    Medication Refill       History of Present Illness:  Patient is here for six-month follow-up of chronic medical problems,  She has hypertension which is well-controlled, hyperlipidemia which is high because she has statin intolerance.  She is still smokes discussed the hazards of smoking.  She is willing to go to smoking cessation class.      ROS:  Review of Systems   Constitutional: Negative for activity change, chills, fatigue, fever and unexpected weight change.   HENT: Negative for congestion, ear discharge, ear pain, hearing loss, postnasal drip and rhinorrhea.    Eyes: Negative for pain and visual disturbance.   Respiratory: Negative for cough, chest tightness and shortness of breath.    Cardiovascular: Negative for chest pain and palpitations.   Gastrointestinal: Negative for abdominal pain, diarrhea and vomiting.   Endocrine: Negative for heat intolerance.   Genitourinary: Negative for dysuria, flank pain, frequency and hematuria.   Musculoskeletal: Negative for back pain, gait problem and neck pain.   Skin: Negative for color change and rash.   Neurological: Negative for dizziness, tremors, seizures, numbness and headaches.   Psychiatric/Behavioral: Negative for agitation, hallucinations, self-injury, sleep disturbance and suicidal ideas. The patient is not nervous/anxious.        Past Medical History:   Diagnosis Date    Arthritis     Hyperlipidemia     Hypertension     Osteoporosis        Social History:  Social History     Social History    Marital status:      Spouse name: N/A    Number of children: N/A    Years of education: N/A     Social History Main Topics    Smoking status: Current Every Day Smoker     Packs/day: 0.50     Years: 45.00     Types: Cigarettes    Smokeless tobacco: Never Used    Alcohol use No    Drug use: No    Sexual activity: No     Other Topics Concern    None     Social History Narrative     "None       Family History:   family history is not on file.    Health Maintenance   Topic Date Due    TETANUS VACCINE  07/19/1975    Pneumococcal PPSV23 (Medium Risk) (1) 07/19/1975    Fecal Occult Blood Test (FOBT)/FitKit  07/19/2007    Zoster Vaccine  07/19/2017    Influenza Vaccine  08/01/2017    Mammogram  12/18/2017    Pap Smear with HPV Cotest  06/18/2018    Lipid Panel  02/16/2022    Hepatitis C Screening  Completed       Physical Exam:    Vital Signs  Temp: 97.7 °F (36.5 °C)  Temp src: Tympanic  Pulse: 82  SpO2: 96 %  BP: 112/70  BP Location: Left arm  Patient Position: Sitting  Pain Score: 0-No pain  Height and Weight  Height: 5' 4" (162.6 cm)  Weight: 60.7 kg (133 lb 13.1 oz)  BSA (Calculated - sq m): 1.66 sq meters  BMI (Calculated): 23  Weight in (lb) to have BMI = 25: 145.3]    Body mass index is 22.97 kg/m².    Physical Exam   Constitutional: She is oriented to person, place, and time. She appears well-developed.   HENT:   Mouth/Throat: Oropharynx is clear and moist.   Eyes: Conjunctivae are normal. Pupils are equal, round, and reactive to light.   Neck: Normal range of motion. Neck supple.   Cardiovascular: Normal rate, regular rhythm and normal heart sounds.    No murmur heard.  Pulmonary/Chest: Effort normal and breath sounds normal. No respiratory distress. She has no wheezes. She has no rales. She exhibits no tenderness.   Abdominal: Soft. She exhibits no distension and no mass. There is no tenderness. There is no guarding.   Musculoskeletal: She exhibits no edema or tenderness.   Lymphadenopathy:     She has no cervical adenopathy.   Neurological: She is alert and oriented to person, place, and time. She has normal reflexes.   Skin: Skin is warm and dry.   Psychiatric: She has a normal mood and affect. Her behavior is normal. Judgment and thought content normal.       Lab Results   Component Value Date    CHOL 288 (H) 02/16/2017    CHOL 238 (H) 03/16/2015    CHOL 256 (H) 04/08/2011    " TRIG 284 (H) 02/16/2017    TRIG 175 (H) 03/16/2015    TRIG 134 04/08/2011    HDL 58 02/16/2017    HDL 57 03/16/2015    HDL 53 04/08/2011    TOTALCHOLEST 5.0 02/16/2017    TOTALCHOLEST 4.2 03/16/2015    TOTALCHOLEST 4.8 04/08/2011    NONHDLCHOL 230 02/16/2017    NONHDLCHOL 181 03/16/2015       Lab Results   Component Value Date    HGBA1C 5.1 02/16/2017       Assessment:      ICD-10-CM ICD-9-CM   1. Essential hypertension I10 401.9   2. Mixed hyperlipidemia E78.2 272.2   3. Statin intolerance Z78.9 995.27   4. Colon cancer screening Z12.11 V76.51   5. Tobacco abuse Z72.0 305.1         Plan:  Continue current meds and plan.  She was referred to smoking cessation class.  Depression stable  Recommend screening colonoscopy.  She refused we will do a stool test.  Orders Placed This Encounter   Procedures    Occult Blood Stool, CA Screen    Lipid panel    Hemoglobin A1c    Comprehensive metabolic panel    Ambulatory referral to Smoking Cessation Program       Current Outpatient Prescriptions   Medication Sig Dispense Refill    citalopram (CELEXA) 40 MG tablet TAKE ONE TABLET BY MOUTH EVERY DAY 30 tablet 4    cyanocobalamin (VITAMIN B-12) 1000 MCG tablet Take 100 mcg by mouth once daily.      metoprolol succinate (TOPROL-XL) 25 MG 24 hr tablet TAKE ONE-HALF TABLET BY MOUTH EVERY DAY 75 tablet 2    potassium chloride (MICRO-K) 10 MEQ CpSR Take 1 capsule (10 mEq total) by mouth once daily. 30 capsule 6    triamterene-hydrochlorothiazide 37.5-25 mg (MAXZIDE-25) 37.5-25 mg per tablet Take 0.5 tablets by mouth once daily. 45 tablet 6    vitamin D 1000 units Tab Take 1,000 mg by mouth once daily.       No current facility-administered medications for this visit.        Medications Discontinued During This Encounter   Medication Reason    amlodipine-benazepril 5-20 mg (LOTREL) 5-20 mg per capsule Patient no longer taking    triamterene-hydrochlorothiazide 37.5-25 mg (MAXZIDE-25) 37.5-25 mg per tablet Reorder     potassium chloride (MICRO-K) 10 MEQ CpSR Reorder       Return in about 6 months (around 2/16/2018).      Dr Alonso Cortez MD    Disclaimer: This note is prepared using voice recognition system and as such is likely to have errors and is not proof read.

## 2017-11-14 RX ORDER — CITALOPRAM 40 MG/1
TABLET, FILM COATED ORAL
Qty: 30 TABLET | Refills: 2 | Status: SHIPPED | OUTPATIENT
Start: 2017-11-14 | End: 2018-02-20 | Stop reason: SDUPTHER

## 2018-01-10 ENCOUNTER — OFFICE VISIT (OUTPATIENT)
Dept: URGENT CARE | Facility: CLINIC | Age: 63
End: 2018-01-10
Payer: MEDICAID

## 2018-01-10 ENCOUNTER — HOSPITAL ENCOUNTER (OUTPATIENT)
Dept: RADIOLOGY | Facility: HOSPITAL | Age: 63
Discharge: HOME | End: 2018-01-10
Attending: NURSE PRACTITIONER
Payer: MEDICAID

## 2018-01-10 VITALS
BODY MASS INDEX: 22.55 KG/M2 | DIASTOLIC BLOOD PRESSURE: 64 MMHG | RESPIRATION RATE: 20 BRPM | HEIGHT: 64 IN | OXYGEN SATURATION: 99 % | SYSTOLIC BLOOD PRESSURE: 120 MMHG | HEART RATE: 79 BPM | WEIGHT: 132.06 LBS | TEMPERATURE: 97 F

## 2018-01-10 DIAGNOSIS — R10.9 RIGHT SIDED ABDOMINAL PAIN: Primary | ICD-10-CM

## 2018-01-10 DIAGNOSIS — R10.9 RIGHT SIDED ABDOMINAL PAIN: ICD-10-CM

## 2018-01-10 PROCEDURE — 99214 OFFICE O/P EST MOD 30 MIN: CPT | Mod: PBBFAC,PO | Performed by: NURSE PRACTITIONER

## 2018-01-10 PROCEDURE — 74019 RADEX ABDOMEN 2 VIEWS: CPT | Mod: TC,PO

## 2018-01-10 PROCEDURE — 99214 OFFICE O/P EST MOD 30 MIN: CPT | Mod: S$PBB,,, | Performed by: NURSE PRACTITIONER

## 2018-01-10 PROCEDURE — 99999 PR PBB SHADOW E&M-EST. PATIENT-LVL IV: CPT | Mod: PBBFAC,,, | Performed by: NURSE PRACTITIONER

## 2018-01-10 PROCEDURE — 74019 RADEX ABDOMEN 2 VIEWS: CPT | Mod: 26,,, | Performed by: RADIOLOGY

## 2018-01-10 RX ORDER — IBUPROFEN 800 MG/1
800 TABLET ORAL 2 TIMES DAILY PRN
Qty: 20 TABLET | Refills: 0 | Status: SHIPPED | OUTPATIENT
Start: 2018-01-10 | End: 2018-01-20

## 2018-01-10 NOTE — PATIENT INSTRUCTIONS
Abdominal Pain    Abdominal pain is pain in the stomach or belly area. Everyone has this pain from time to time. In many cases it goes away on its own. But abdominal pain can sometimes be due to a serious problem, such as appendicitis. So its important to know when to seek help.  Causes of abdominal pain  There are many possible causes of abdominal pain. Common causes in adults include:  · Constipation, diarrhea, or gas  · Stomach acid flowing back up into the esophagus (acid reflux or heartburn)  · Severe acid reflux, called GERD (gastroesophageal reflux disease)  · A sore in the lining of the stomach or small intestine (peptic ulcer)  · Inflammation of the gallbladder, liver, or pancreas  · Gallstones or kidney stones  · Appendicitis   · Intestinal blockage   · An internal organ pushing through a muscle or other tissue (hernia)  · Urinary tract infections  · In women, menstrual cramps, fibroids, or endometriosis  · Inflammation or infection of the intestines  Diagnosing the cause of abdominal pain  Your healthcare provider will do a physical exam help find the cause of your pain. If needed, tests will be ordered. Belly pain has many possible causes. So it can be hard to find the reason for your pain. Giving details about your pain can help. Tell your provider where and when you feel the pain, and what makes it better or worse. Also let your provider know if you have other symptoms such as:  · Fever  · Tiredness  · Upset stomach (nausea)  · Vomiting  · Changes in bathroom habits  Treating abdominal pain  Some causes of pain need emergency medical treatment right away. These include appendicitis or a bowel blockage. Other problems can be treated with rest, fluids, or medicines. Your healthcare provider can give you specific instructions for treatment or self-care based on what is causing your pain.  If you have vomiting or diarrhea, sip water or other clear fluids. When you are ready to eat solid foods again,  start with small amounts of easy-to-digest, low-fat foods. These include apple sauce, toast, or crackers.   When to seek medical care  Call 911 or go to the hospital right away if you:  · Cant pass stool and are vomiting  · Are vomiting blood or have bloody diarrhea or black, tarry diarrhea  · Have chest, neck, or shoulder pain  · Feel like you might pass out  · Have pain in your shoulder blades with nausea  · Have sudden, severe belly pain  · Have new, severe pain unlike any you have felt before  · Have a belly that is rigid, hard, and tender to touch  Call your healthcare provider if you have:  · Pain for more than 5 days  · Bloating for more than 2 days  · Diarrhea for more than 5 days  · A fever of 100.4°F (38.0°C) or higher, or as directed by your provider  · Pain that gets worse  · Weight loss for no reason  · Continued lack of appetite  · Blood in your stool  How to prevent abdominal pain  Here are some tips to help prevent abdominal pain:  · Eat smaller amounts of food at one time.  · Avoid greasy, fried, or other high-fat foods.  · Avoid foods that give you gas.  · Exercise regularly.  · Drink plenty of fluids.  To help prevent GERD symptoms:  · Quit smoking.  · Reduce alcohol and certain foods that increase stomach acid.  · Avoid aspirin and over-the-counter pain and fever medicines (NSAIDS or nonsteroidal anti-inflammatory drugs), if possible  · Lose extra weight.  · Finish eating at least 2 hours before you go to bed or lie down.  · Raise the head of your bed.  Date Last Reviewed: 7/1/2016  © 9766-8689 [employer]. [address] [place] [address], [place] [address]. All rights reserved. This information is not intended as a substitute for professional medical care. Always follow your healthcare professional's instructions.

## 2018-01-10 NOTE — PROGRESS NOTES
Subjective:       Patient ID: Emily Ordaz is a 62 y.o. female.    Chief Complaint: Flank Pain (Right side pain, and under right breast)    Patient is here due to intermittent right rib/RUQ pain. Denies injury. She had a URI about a month ago but the symptoms have nearly resolved. No n/v/d. No rash. She reports turning in an awkard position about 1 week ago, which worsened the pain       Flank Pain   This is a new problem. The current episode started more than 1 month ago. Associated symptoms include abdominal pain. Pertinent negatives include no chest pain, dysuria, fever, headaches or weakness.     Review of Systems   Constitutional: Negative for activity change, appetite change, chills, diaphoresis, fatigue and fever.   HENT: Negative for congestion, ear discharge, ear pain, postnasal drip, rhinorrhea, sinus pain, sinus pressure, sneezing and sore throat.    Respiratory: Positive for cough (resolving). Negative for shortness of breath and wheezing.    Cardiovascular: Negative for chest pain and palpitations.   Gastrointestinal: Positive for abdominal pain. Negative for abdominal distention, blood in stool, constipation, diarrhea, nausea and vomiting.   Genitourinary: Positive for flank pain. Negative for difficulty urinating, dysuria, frequency, hematuria and urgency.   Musculoskeletal: Negative for back pain and myalgias.   Neurological: Negative for dizziness, weakness and headaches.       Objective:      Physical Exam   Constitutional: She is oriented to person, place, and time. She appears well-developed and well-nourished.  Non-toxic appearance. She does not have a sickly appearance. She does not appear ill. No distress.   HENT:   Head: Normocephalic.   Right Ear: Tympanic membrane, external ear and ear canal normal.   Left Ear: Tympanic membrane, external ear and ear canal normal.   Nose: Nose normal. No mucosal edema or rhinorrhea. Right sinus exhibits no maxillary sinus tenderness and no frontal  sinus tenderness. Left sinus exhibits no maxillary sinus tenderness and no frontal sinus tenderness.   Mouth/Throat: Uvula is midline, oropharynx is clear and moist and mucous membranes are normal. No oropharyngeal exudate, posterior oropharyngeal edema or posterior oropharyngeal erythema.   Eyes: Conjunctivae and EOM are normal.   Neck: Normal range of motion. Neck supple.   Cardiovascular: Normal rate, regular rhythm and normal heart sounds.    Pulmonary/Chest: Effort normal and breath sounds normal. No accessory muscle usage. No apnea, no tachypnea and no bradypnea. No respiratory distress. She has no decreased breath sounds. She has no wheezes. She has no rhonchi. She has no rales.   Abdominal: Soft. Normal appearance and bowel sounds are normal. She exhibits no distension. There is no tenderness. There is no rigidity, no rebound, no guarding, no CVA tenderness, no tenderness at McBurney's point and negative Quarles's sign.       No pain on palpation; had pain with position change (supine to sitting)   Lymphadenopathy:        Head (right side): No submental, no submandibular and no tonsillar adenopathy present.        Head (left side): No submental, no submandibular and no tonsillar adenopathy present.     She has no cervical adenopathy.   Neurological: She is alert and oriented to person, place, and time.   Skin: Skin is warm and dry. No rash noted. She is not diaphoretic.       Assessment:       1. Right sided abdominal pain        Plan:   Emily was seen today for flank pain.    Diagnoses and all orders for this visit:    Right sided abdominal pain  -     X-Ray Abdomen Flat And Erect; Future  -     ibuprofen (ADVIL,MOTRIN) 800 MG tablet; Take 1 tablet (800 mg total) by mouth 2 (two) times daily as needed for Pain.  -     CBC auto differential; Future  -     Comprehensive metabolic panel; Future  -     H.Pylori Antibody IgG; Future  -     Amylase; Future  -     Lipase; Future        -     Diagnosis and  treatment discussed, AVS provided. Discussed possible causes, appears muscular   -     Try low effective dose of NSAIDs, pt decline muscle relaxer; try ice/heat  -     Follow up with PCP or ER immediately for worsening, new or no improvement of symptoms.   -     Patient understands and agrees with plan

## 2018-01-11 ENCOUNTER — TELEPHONE (OUTPATIENT)
Dept: URGENT CARE | Facility: CLINIC | Age: 63
End: 2018-01-11

## 2018-01-11 NOTE — TELEPHONE ENCOUNTER
Discussed results with patient, advised to try miralax ; if no improvement in a week or worsening see GI. No further questions, she voiced understanding

## 2018-02-15 ENCOUNTER — PATIENT OUTREACH (OUTPATIENT)
Dept: ADMINISTRATIVE | Facility: HOSPITAL | Age: 63
End: 2018-02-15

## 2018-02-15 NOTE — PROGRESS NOTES
I spoke with pt to schedule over due HM: Mammogram. Patient verbalized understanding of appt. Appt slip mailed.

## 2018-02-15 NOTE — PROGRESS NOTES
Pre visit chart audit performed. I spoke with pt to schedule over due HM: Mammogram. Patient verbalized understanding of appt. Appt slip mailed. Pt stated she has lost her FOBT Kit and will ask for another one when she comes in for her labs and will bring it the day she comes for her Dr visit.

## 2018-02-20 RX ORDER — CITALOPRAM 40 MG/1
TABLET, FILM COATED ORAL
Qty: 30 TABLET | Refills: 2 | Status: SHIPPED | OUTPATIENT
Start: 2018-02-20 | End: 2018-02-26

## 2018-02-26 ENCOUNTER — OFFICE VISIT (OUTPATIENT)
Dept: FAMILY MEDICINE | Facility: CLINIC | Age: 63
End: 2018-02-26
Payer: MEDICAID

## 2018-02-26 VITALS
TEMPERATURE: 97 F | OXYGEN SATURATION: 98 % | SYSTOLIC BLOOD PRESSURE: 130 MMHG | HEART RATE: 77 BPM | HEIGHT: 64 IN | DIASTOLIC BLOOD PRESSURE: 70 MMHG | WEIGHT: 135.25 LBS | BODY MASS INDEX: 23.09 KG/M2

## 2018-02-26 DIAGNOSIS — I10 ESSENTIAL HYPERTENSION: Primary | ICD-10-CM

## 2018-02-26 DIAGNOSIS — E78.2 MIXED HYPERLIPIDEMIA: ICD-10-CM

## 2018-02-26 DIAGNOSIS — Z12.11 COLON CANCER SCREENING: ICD-10-CM

## 2018-02-26 DIAGNOSIS — Z78.9 STATIN INTOLERANCE: ICD-10-CM

## 2018-02-26 DIAGNOSIS — R10.13 EPIGASTRIC PAIN: ICD-10-CM

## 2018-02-26 DIAGNOSIS — Z12.39 BREAST CANCER SCREENING: ICD-10-CM

## 2018-02-26 PROCEDURE — 99213 OFFICE O/P EST LOW 20 MIN: CPT | Mod: PBBFAC,PO | Performed by: FAMILY MEDICINE

## 2018-02-26 PROCEDURE — 99214 OFFICE O/P EST MOD 30 MIN: CPT | Mod: S$PBB,,, | Performed by: FAMILY MEDICINE

## 2018-02-26 PROCEDURE — 90472 IMMUNIZATION ADMIN EACH ADD: CPT | Mod: PBBFAC,PO

## 2018-02-26 PROCEDURE — 3008F BODY MASS INDEX DOCD: CPT | Mod: ,,, | Performed by: FAMILY MEDICINE

## 2018-02-26 PROCEDURE — 90471 IMMUNIZATION ADMIN: CPT | Mod: PBBFAC,PO

## 2018-02-26 PROCEDURE — 99999 PR PBB SHADOW E&M-EST. PATIENT-LVL III: CPT | Mod: PBBFAC,,, | Performed by: FAMILY MEDICINE

## 2018-02-26 RX ORDER — PANTOPRAZOLE SODIUM 40 MG/1
40 TABLET, DELAYED RELEASE ORAL DAILY
Qty: 30 TABLET | Refills: 0 | Status: SHIPPED | OUTPATIENT
Start: 2018-02-26 | End: 2019-08-29

## 2018-02-26 NOTE — PROGRESS NOTES
Chief Complaint:    Chief Complaint   Patient presents with    Follow-up       History of Present Illness:    Presents to for six-month follow-up,  She's had some right upper quadrant pain which for which she went to see the urgent care and it has gotten better her lipase was slightly elevated.  She still occasionally gets epigastric discomfort when she drank some coke.  She has hypertension which is controlled.  Hyperlipidemia but has statin intolerance.  She said she ran out of Celexa and stopped using it and actually felt better and does not want to go back on it which is certainly okay.  She used to smoke but recently quit smoking.    ROS:  Review of Systems   Constitutional: Negative for activity change, chills, fatigue, fever and unexpected weight change.   HENT: Negative for congestion, ear discharge, ear pain, hearing loss, postnasal drip and rhinorrhea.    Eyes: Negative for pain and visual disturbance.   Respiratory: Negative for cough, chest tightness and shortness of breath.    Cardiovascular: Negative for chest pain and palpitations.   Gastrointestinal: Negative for abdominal pain, diarrhea and vomiting.   Endocrine: Negative for heat intolerance.   Genitourinary: Negative for dysuria, flank pain, frequency and hematuria.   Musculoskeletal: Negative for back pain, gait problem and neck pain.   Skin: Negative for color change and rash.   Neurological: Negative for dizziness, tremors, seizures, numbness and headaches.   Psychiatric/Behavioral: Negative for agitation, hallucinations, self-injury, sleep disturbance and suicidal ideas. The patient is not nervous/anxious.        Past Medical History:   Diagnosis Date    Arthritis     Hyperlipidemia     Hypertension     Osteoporosis        Social History:  Social History     Social History    Marital status:      Spouse name: N/A    Number of children: N/A    Years of education: N/A     Social History Main Topics    Smoking status: Current  "Every Day Smoker     Packs/day: 0.50     Years: 45.00     Types: Cigarettes    Smokeless tobacco: Never Used    Alcohol use No    Drug use: No    Sexual activity: No     Other Topics Concern    None     Social History Narrative    None       Family History:   family history is not on file.    Health Maintenance   Topic Date Due    Fecal Occult Blood Test (FOBT)/FitKit  1957    TETANUS VACCINE  07/19/1975    Pneumococcal PPSV23 (Medium Risk) (1) 07/19/1975    Mammogram  12/18/2016    Zoster Vaccine  07/19/2017    Influenza Vaccine  08/01/2017    Lipid Panel  02/16/2018    Pap Smear with HPV Cotest  06/18/2018    Hepatitis C Screening  Completed       Physical Exam:    Vital Signs  Temp: 96.7 °F (35.9 °C)  Temp src: Tympanic  Pulse: 77  SpO2: 98 %  BP: 130/70  BP Location: Left arm  Patient Position: Sitting  Pain Score: 0-No pain  Height and Weight  Height: 5' 4" (162.6 cm)  Weight: 61.4 kg (135 lb 4 oz)  BSA (Calculated - sq m): 1.66 sq meters  BMI (Calculated): 23.3  Weight in (lb) to have BMI = 25: 145.3]    Body mass index is 23.22 kg/m².    Physical Exam   Constitutional: She is oriented to person, place, and time. She appears well-developed.   HENT:   Mouth/Throat: Oropharynx is clear and moist.   Eyes: Conjunctivae are normal. Pupils are equal, round, and reactive to light.   Neck: Normal range of motion. Neck supple.   Cardiovascular: Normal rate, regular rhythm and normal heart sounds.    No murmur heard.  Pulmonary/Chest: Effort normal and breath sounds normal. No respiratory distress. She has no wheezes. She has no rales. She exhibits no tenderness.   Abdominal: Soft. She exhibits no distension and no mass. There is no tenderness. There is no guarding.   Musculoskeletal: She exhibits no edema or tenderness.   Lymphadenopathy:     She has no cervical adenopathy.   Neurological: She is alert and oriented to person, place, and time. She has normal reflexes.   Skin: Skin is warm and dry. "   Psychiatric: She has a normal mood and affect. Her behavior is normal. Judgment and thought content normal.       Lab Results   Component Value Date    CHOL 288 (H) 02/16/2017    CHOL 238 (H) 03/16/2015    CHOL 256 (H) 04/08/2011    TRIG 284 (H) 02/16/2017    TRIG 175 (H) 03/16/2015    TRIG 134 04/08/2011    HDL 58 02/16/2017    HDL 57 03/16/2015    HDL 53 04/08/2011    TOTALCHOLEST 5.0 02/16/2017    TOTALCHOLEST 4.2 03/16/2015    TOTALCHOLEST 4.8 04/08/2011    NONHDLCHOL 230 02/16/2017    NONHDLCHOL 181 03/16/2015       Lab Results   Component Value Date    HGBA1C 5.1 02/16/2017       Assessment:      ICD-10-CM ICD-9-CM   1. Essential hypertension I10 401.9   2. Mixed hyperlipidemia E78.2 272.2   3. Statin intolerance Z78.9 995.27   4. Breast cancer screening Z12.31 V76.10   5. Epigastric pain R10.13 789.06   6. Colon cancer screening Z12.11 V76.51         Plan:  We'll check her lipase start on Protonix for 4 weeks if the epigastric pain continues will need further workup and treatment please let us know.  Check labs as below  She has a screening mammogram scheduled as she stays.  She says she got up Pap smear about 2 weeks back with the gynecologist will get records from them she is filling out the paperwork.  We'll do a stool test for colon cancer she did not return her stool specimen last time.  Please engage in an exercise program and work on weight loss follow-up 6 months to year.  Orders Placed This Encounter   Procedures    (In Office Administered) Tdap Vaccine    (In Office Administered) Pneumococcal Polysaccharide Vaccine (23 Valent) (SQ/IM)    Lipase    Fecal Immunochemical Test (iFOBT)    Comprehensive metabolic panel    Hemoglobin A1c    Lipid panel       Current Outpatient Prescriptions   Medication Sig Dispense Refill    metoprolol succinate (TOPROL-XL) 25 MG 24 hr tablet TAKE ONE-HALF TABLET BY MOUTH EVERY DAY 75 tablet 2    potassium chloride (MICRO-K) 10 MEQ CpSR Take 1 capsule (10 mEq  total) by mouth once daily. 30 capsule 6    triamterene-hydrochlorothiazide 37.5-25 mg (MAXZIDE-25) 37.5-25 mg per tablet Take 0.5 tablets by mouth once daily. 45 tablet 6    pantoprazole (PROTONIX) 40 MG tablet Take 1 tablet (40 mg total) by mouth once daily. 30 tablet 0     No current facility-administered medications for this visit.        Medications Discontinued During This Encounter   Medication Reason    vitamin D 1000 units Tab Patient no longer taking    citalopram (CELEXA) 40 MG tablet        Follow-up in about 6 months (around 8/26/2018).      Alonso Cortez MD

## 2018-03-10 RX ORDER — POTASSIUM CHLORIDE 750 MG/1
CAPSULE, EXTENDED RELEASE ORAL
Qty: 30 CAPSULE | Refills: 4 | Status: SHIPPED | OUTPATIENT
Start: 2018-03-10 | End: 2018-08-08 | Stop reason: SDUPTHER

## 2018-03-14 ENCOUNTER — HOSPITAL ENCOUNTER (OUTPATIENT)
Dept: RADIOLOGY | Facility: HOSPITAL | Age: 63
Discharge: HOME | End: 2018-03-14
Attending: FAMILY MEDICINE
Payer: MEDICAID

## 2018-03-14 VITALS — BODY MASS INDEX: 23.05 KG/M2 | WEIGHT: 135 LBS | HEIGHT: 64 IN

## 2018-03-14 DIAGNOSIS — Z12.39 BREAST CANCER SCREENING: ICD-10-CM

## 2018-03-14 PROCEDURE — 77067 SCR MAMMO BI INCL CAD: CPT | Mod: 26,,, | Performed by: RADIOLOGY

## 2018-03-14 PROCEDURE — 77067 SCR MAMMO BI INCL CAD: CPT | Mod: TC

## 2018-07-25 RX ORDER — METOPROLOL SUCCINATE 25 MG/1
TABLET, EXTENDED RELEASE ORAL
Qty: 75 TABLET | Refills: 3 | Status: SHIPPED | OUTPATIENT
Start: 2018-07-25 | End: 2019-08-02 | Stop reason: SDUPTHER

## 2018-08-08 RX ORDER — POTASSIUM CHLORIDE 750 MG/1
CAPSULE, EXTENDED RELEASE ORAL
Qty: 30 CAPSULE | Refills: 6 | Status: SHIPPED | OUTPATIENT
Start: 2018-08-08 | End: 2019-03-07 | Stop reason: SDUPTHER

## 2018-08-24 RX ORDER — TRIAMTERENE/HYDROCHLOROTHIAZID 37.5-25 MG
TABLET ORAL
Qty: 45 TABLET | Refills: 6 | Status: SHIPPED | OUTPATIENT
Start: 2018-08-24 | End: 2019-08-29 | Stop reason: SDUPTHER

## 2018-09-21 ENCOUNTER — PATIENT OUTREACH (OUTPATIENT)
Dept: ADMINISTRATIVE | Facility: HOSPITAL | Age: 63
End: 2018-09-21

## 2018-09-21 NOTE — PROGRESS NOTES
Called patient with reminder to schedule pap smear. Patient had most recent pap smear at [facility]. I see no record in chart. Patient will call [facility] to find out about report and call me back.

## 2019-03-07 RX ORDER — POTASSIUM CHLORIDE 750 MG/1
CAPSULE, EXTENDED RELEASE ORAL
Qty: 30 CAPSULE | Refills: 6 | Status: SHIPPED | OUTPATIENT
Start: 2019-03-07 | End: 2019-09-09 | Stop reason: SDUPTHER

## 2019-08-02 RX ORDER — METOPROLOL SUCCINATE 25 MG/1
TABLET, EXTENDED RELEASE ORAL
Qty: 75 TABLET | Refills: 3 | Status: SHIPPED | OUTPATIENT
Start: 2019-08-02 | End: 2020-07-20

## 2019-08-25 RX ORDER — TRIAMTERENE/HYDROCHLOROTHIAZID 37.5-25 MG
TABLET ORAL
Qty: 45 TABLET | Refills: 6 | OUTPATIENT
Start: 2019-08-25

## 2019-08-29 ENCOUNTER — OFFICE VISIT (OUTPATIENT)
Dept: FAMILY MEDICINE | Facility: CLINIC | Age: 64
End: 2019-08-29
Payer: MEDICAID

## 2019-08-29 ENCOUNTER — LAB VISIT (OUTPATIENT)
Dept: LAB | Facility: HOSPITAL | Age: 64
End: 2019-08-29
Attending: FAMILY MEDICINE
Payer: MEDICAID

## 2019-08-29 VITALS
BODY MASS INDEX: 22.99 KG/M2 | OXYGEN SATURATION: 99 % | HEIGHT: 64 IN | DIASTOLIC BLOOD PRESSURE: 82 MMHG | WEIGHT: 134.69 LBS | TEMPERATURE: 98 F | HEART RATE: 86 BPM | SYSTOLIC BLOOD PRESSURE: 160 MMHG

## 2019-08-29 DIAGNOSIS — I10 ESSENTIAL HYPERTENSION: ICD-10-CM

## 2019-08-29 DIAGNOSIS — Z12.11 COLON CANCER SCREENING: ICD-10-CM

## 2019-08-29 DIAGNOSIS — Z12.39 BREAST CANCER SCREENING: ICD-10-CM

## 2019-08-29 DIAGNOSIS — F43.0 STRESS REACTION: ICD-10-CM

## 2019-08-29 DIAGNOSIS — I10 ESSENTIAL HYPERTENSION: Primary | ICD-10-CM

## 2019-08-29 LAB
ALBUMIN SERPL BCP-MCNC: 4.4 G/DL (ref 3.5–5.2)
ALP SERPL-CCNC: 88 U/L (ref 55–135)
ALT SERPL W/O P-5'-P-CCNC: 20 U/L (ref 10–44)
ANION GAP SERPL CALC-SCNC: 8 MMOL/L (ref 8–16)
AST SERPL-CCNC: 26 U/L (ref 10–40)
BASOPHILS # BLD AUTO: 0.03 K/UL (ref 0–0.2)
BASOPHILS NFR BLD: 0.5 % (ref 0–1.9)
BILIRUB SERPL-MCNC: 0.6 MG/DL (ref 0.1–1)
BUN SERPL-MCNC: 15 MG/DL (ref 8–23)
CALCIUM SERPL-MCNC: 9.6 MG/DL (ref 8.7–10.5)
CHLORIDE SERPL-SCNC: 102 MMOL/L (ref 95–110)
CHOLEST SERPL-MCNC: 271 MG/DL (ref 120–199)
CHOLEST/HDLC SERPL: 4.6 {RATIO} (ref 2–5)
CO2 SERPL-SCNC: 29 MMOL/L (ref 23–29)
CREAT SERPL-MCNC: 0.9 MG/DL (ref 0.5–1.4)
DIFFERENTIAL METHOD: NORMAL
EOSINOPHIL # BLD AUTO: 0.2 K/UL (ref 0–0.5)
EOSINOPHIL NFR BLD: 2.4 % (ref 0–8)
ERYTHROCYTE [DISTWIDTH] IN BLOOD BY AUTOMATED COUNT: 13.2 % (ref 11.5–14.5)
EST. GFR  (AFRICAN AMERICAN): >60 ML/MIN/1.73 M^2
EST. GFR  (NON AFRICAN AMERICAN): >60 ML/MIN/1.73 M^2
ESTIMATED AVG GLUCOSE: 103 MG/DL (ref 68–131)
GLUCOSE SERPL-MCNC: 129 MG/DL (ref 70–110)
HBA1C MFR BLD HPLC: 5.2 % (ref 4–5.6)
HCT VFR BLD AUTO: 38.8 % (ref 37–48.5)
HDLC SERPL-MCNC: 59 MG/DL (ref 40–75)
HDLC SERPL: 21.8 % (ref 20–50)
HGB BLD-MCNC: 13 G/DL (ref 12–16)
IMM GRANULOCYTES # BLD AUTO: 0.02 K/UL (ref 0–0.04)
IMM GRANULOCYTES NFR BLD AUTO: 0.3 % (ref 0–0.5)
LDLC SERPL CALC-MCNC: 155.4 MG/DL (ref 63–159)
LYMPHOCYTES # BLD AUTO: 2.5 K/UL (ref 1–4.8)
LYMPHOCYTES NFR BLD: 38.6 % (ref 18–48)
MCH RBC QN AUTO: 31 PG (ref 27–31)
MCHC RBC AUTO-ENTMCNC: 33.5 G/DL (ref 32–36)
MCV RBC AUTO: 93 FL (ref 82–98)
MONOCYTES # BLD AUTO: 0.5 K/UL (ref 0.3–1)
MONOCYTES NFR BLD: 7.5 % (ref 4–15)
NEUTROPHILS # BLD AUTO: 3.2 K/UL (ref 1.8–7.7)
NEUTROPHILS NFR BLD: 50.7 % (ref 38–73)
NONHDLC SERPL-MCNC: 212 MG/DL
NRBC BLD-RTO: 0 /100 WBC
PLATELET # BLD AUTO: 227 K/UL (ref 150–350)
PMV BLD AUTO: 10.5 FL (ref 9.2–12.9)
POTASSIUM SERPL-SCNC: 3.3 MMOL/L (ref 3.5–5.1)
PROT SERPL-MCNC: 8.3 G/DL (ref 6–8.4)
RBC # BLD AUTO: 4.19 M/UL (ref 4–5.4)
SODIUM SERPL-SCNC: 139 MMOL/L (ref 136–145)
TRIGL SERPL-MCNC: 283 MG/DL (ref 30–150)
WBC # BLD AUTO: 6.37 K/UL (ref 3.9–12.7)

## 2019-08-29 PROCEDURE — 80061 LIPID PANEL: CPT

## 2019-08-29 PROCEDURE — 36415 COLL VENOUS BLD VENIPUNCTURE: CPT | Mod: PO

## 2019-08-29 PROCEDURE — 99214 PR OFFICE/OUTPT VISIT, EST, LEVL IV, 30-39 MIN: ICD-10-PCS | Mod: S$PBB,,, | Performed by: FAMILY MEDICINE

## 2019-08-29 PROCEDURE — 99999 PR PBB SHADOW E&M-EST. PATIENT-LVL III: ICD-10-PCS | Mod: PBBFAC,,, | Performed by: FAMILY MEDICINE

## 2019-08-29 PROCEDURE — 99213 OFFICE O/P EST LOW 20 MIN: CPT | Mod: PBBFAC,PO | Performed by: FAMILY MEDICINE

## 2019-08-29 PROCEDURE — 99999 PR PBB SHADOW E&M-EST. PATIENT-LVL III: CPT | Mod: PBBFAC,,, | Performed by: FAMILY MEDICINE

## 2019-08-29 PROCEDURE — 85025 COMPLETE CBC W/AUTO DIFF WBC: CPT

## 2019-08-29 PROCEDURE — 83036 HEMOGLOBIN GLYCOSYLATED A1C: CPT

## 2019-08-29 PROCEDURE — 80053 COMPREHEN METABOLIC PANEL: CPT

## 2019-08-29 PROCEDURE — 99214 OFFICE O/P EST MOD 30 MIN: CPT | Mod: S$PBB,,, | Performed by: FAMILY MEDICINE

## 2019-08-29 RX ORDER — CITALOPRAM 20 MG/1
20 TABLET, FILM COATED ORAL DAILY
Qty: 30 TABLET | Refills: 11 | Status: SHIPPED | OUTPATIENT
Start: 2019-08-29 | End: 2020-08-16

## 2019-08-29 RX ORDER — LOSARTAN POTASSIUM 50 MG/1
50 TABLET ORAL DAILY
Qty: 90 TABLET | Refills: 1 | Status: SHIPPED | OUTPATIENT
Start: 2019-08-29 | End: 2020-01-28

## 2019-08-29 RX ORDER — TRIAMTERENE/HYDROCHLOROTHIAZID 37.5-25 MG
0.5 TABLET ORAL DAILY
Qty: 45 TABLET | Refills: 6 | Status: SHIPPED | OUTPATIENT
Start: 2019-08-29 | End: 2020-08-21 | Stop reason: SDUPTHER

## 2019-08-29 NOTE — PROGRESS NOTES
Chief Complaint:    Chief Complaint   Patient presents with    Annual Exam       History of Present Illness:    Presents today after long time:  She quit smoking few months back and is doing okay.  Blood pressure is running high today and does not check her blood pressure readings at home.  Complains of significant stress at home due to grand kids wants to be back on Celexa      ROS:  Review of Systems   Constitutional: Negative for activity change, chills, fatigue, fever and unexpected weight change.   HENT: Negative for congestion, ear discharge, ear pain, hearing loss, postnasal drip and rhinorrhea.    Eyes: Negative for pain and visual disturbance.   Respiratory: Negative for cough, chest tightness and shortness of breath.    Cardiovascular: Negative for chest pain and palpitations.   Gastrointestinal: Negative for abdominal pain, diarrhea and vomiting.   Endocrine: Negative for heat intolerance.   Genitourinary: Negative for dysuria, flank pain, frequency and hematuria.   Musculoskeletal: Negative for back pain, gait problem and neck pain.   Skin: Negative for color change and rash.   Neurological: Negative for dizziness, tremors, seizures, numbness and headaches.   Psychiatric/Behavioral: Negative for agitation, hallucinations, self-injury, sleep disturbance and suicidal ideas. The patient is not nervous/anxious.        Past Medical History:   Diagnosis Date    Arthritis     Hyperlipidemia     Hypertension     Osteoporosis        Social History:  Social History     Socioeconomic History    Marital status:      Spouse name: Not on file    Number of children: Not on file    Years of education: Not on file    Highest education level: Not on file   Occupational History    Not on file   Social Needs    Financial resource strain: Not on file    Food insecurity:     Worry: Not on file     Inability: Not on file    Transportation needs:     Medical: Not on file     Non-medical: Not on file  "  Tobacco Use    Smoking status: Former Smoker     Packs/day: 0.50     Years: 45.00     Pack years: 22.50     Types: Cigarettes     Last attempt to quit: 2019     Years since quittin.3    Smokeless tobacco: Never Used   Substance and Sexual Activity    Alcohol use: No     Alcohol/week: 0.0 oz    Drug use: No    Sexual activity: Never     Partners: Male     Birth control/protection: None   Lifestyle    Physical activity:     Days per week: Not on file     Minutes per session: Not on file    Stress: Not on file   Relationships    Social connections:     Talks on phone: Not on file     Gets together: Not on file     Attends Buddhism service: Not on file     Active member of club or organization: Not on file     Attends meetings of clubs or organizations: Not on file     Relationship status: Not on file   Other Topics Concern    Not on file   Social History Narrative    Not on file       Family History:   family history includes Breast cancer in her paternal aunt.    Health Maintenance   Topic Date Due    Colonoscopy  2007    Lipid Panel  2018    Pap Smear with HPV Cotest  2018    Mammogram  2019    TETANUS VACCINE  2028    Hepatitis C Screening  Completed       Physical Exam:    Vital Signs  Temp: 97.9 °F (36.6 °C)  Temp src: Temporal  Pulse: 86  SpO2: 99 %  BP: (!) 160/82  BP Location: Left arm  Patient Position: Sitting  Pain Score: 0-No pain  Height and Weight  Height: 5' 4" (162.6 cm)  Weight: 61.1 kg (134 lb 11.2 oz)  BSA (Calculated - sq m): 1.66 sq meters  BMI (Calculated): 23.2  Weight in (lb) to have BMI = 25: 145.3]    Body mass index is 23.12 kg/m².    Physical Exam   Constitutional: She is oriented to person, place, and time. She appears well-developed.   HENT:   Mouth/Throat: Oropharynx is clear and moist.   Eyes: Pupils are equal, round, and reactive to light. Conjunctivae are normal.   Neck: Normal range of motion. Neck supple.   Cardiovascular: " Normal rate, regular rhythm and normal heart sounds.   No murmur heard.  Pulmonary/Chest: Effort normal and breath sounds normal. No respiratory distress. She has no wheezes. She has no rales. She exhibits no tenderness.   Abdominal: Soft. She exhibits no distension and no mass. There is no tenderness. There is no guarding.   Musculoskeletal: She exhibits no edema or tenderness.   Lymphadenopathy:     She has no cervical adenopathy.   Neurological: She is alert and oriented to person, place, and time. She has normal reflexes.   Skin: Skin is warm and dry.   Psychiatric: She has a normal mood and affect. Her behavior is normal. Judgment and thought content normal.         Assessment:      ICD-10-CM ICD-9-CM   1. Essential hypertension I10 401.9   2. Colon cancer screening Z12.11 V76.51   3. Stress reaction F43.0 308.9   4. Breast cancer screening Z12.31 V76.10         Plan:        Start on losartan 50 mg daily continue other medications please monitor blood pressure carefully 2 times a day and bring the numbers back  Start on Celexa 20 mg daily  Schedule a screening mammogram and she will receive the fit kit today  Check labs as below  She come back in 2 weeks for Pap smear and to address her blood pressure and labs        Orders Placed This Encounter   Procedures    Mammo Digital Screening Bilat    Fecal Immunochemical Test (iFOBT)    CBC auto differential    Comprehensive metabolic panel    Lipid panel    Hemoglobin A1c       Current Outpatient Medications   Medication Sig Dispense Refill    metoprolol succinate (TOPROL-XL) 25 MG 24 hr tablet TAKE 1/2 TABLET BY MOUTH EVERY DAY 75 tablet 3    potassium chloride (MICRO-K) 10 MEQ CpSR TAKE ONE CAPSULE BY MOUTH EVERY DAY 30 capsule 6    triamterene-hydrochlorothiazide 37.5-25 mg (MAXZIDE-25) 37.5-25 mg per tablet TAKE 1/2 TABLET BY MOUTH EVERY DAY 45 tablet 6    citalopram (CELEXA) 20 MG tablet Take 1 tablet (20 mg total) by mouth once daily. 30 tablet 11     losartan (COZAAR) 50 MG tablet Take 1 tablet (50 mg total) by mouth once daily. 90 tablet 1     No current facility-administered medications for this visit.        Medications Discontinued During This Encounter   Medication Reason    pantoprazole (PROTONIX) 40 MG tablet Patient no longer taking       Follow up in about 2 weeks (around 9/12/2019).      Alonso Cortez MD

## 2019-09-03 ENCOUNTER — PATIENT OUTREACH (OUTPATIENT)
Dept: ADMINISTRATIVE | Facility: HOSPITAL | Age: 64
End: 2019-09-03

## 2019-09-03 ENCOUNTER — APPOINTMENT (OUTPATIENT)
Dept: LAB | Facility: HOSPITAL | Age: 64
End: 2019-09-03
Attending: FAMILY MEDICINE
Payer: MEDICAID

## 2019-09-06 RX ORDER — POTASSIUM CHLORIDE 750 MG/1
20 CAPSULE, EXTENDED RELEASE ORAL DAILY
Qty: 60 CAPSULE | Refills: 6 | Status: CANCELLED | OUTPATIENT
Start: 2019-09-06

## 2019-09-09 RX ORDER — POTASSIUM CHLORIDE 750 MG/1
20 CAPSULE, EXTENDED RELEASE ORAL DAILY
Qty: 60 CAPSULE | Refills: 2 | Status: SHIPPED | OUTPATIENT
Start: 2019-09-09 | End: 2019-09-25

## 2019-09-10 ENCOUNTER — PATIENT OUTREACH (OUTPATIENT)
Dept: ADMINISTRATIVE | Facility: HOSPITAL | Age: 64
End: 2019-09-10

## 2019-09-24 RX ORDER — POTASSIUM CHLORIDE 750 MG/1
CAPSULE, EXTENDED RELEASE ORAL
Qty: 30 CAPSULE | Refills: 6 | Status: SHIPPED | OUTPATIENT
Start: 2019-09-24 | End: 2019-09-25

## 2019-09-25 RX ORDER — POTASSIUM CHLORIDE 750 MG/1
20 CAPSULE, EXTENDED RELEASE ORAL DAILY
Qty: 180 CAPSULE | Refills: 1 | Status: SHIPPED | OUTPATIENT
Start: 2019-09-25 | End: 2020-03-25

## 2019-09-25 NOTE — TELEPHONE ENCOUNTER
----- Message from Carlos Manuel Goodman sent at 9/25/2019 10:20 AM CDT -----  Need to refill potasium due to double the med per the dr.   Please call to [facility]   Thanks, Carlos Manuel

## 2019-09-26 ENCOUNTER — PATIENT OUTREACH (OUTPATIENT)
Dept: ADMINISTRATIVE | Facility: HOSPITAL | Age: 64
End: 2019-09-26

## 2019-09-26 NOTE — PROGRESS NOTES
Health Maintenance reviewed. I spoke with pt that will schedule mammogram online. Pt stated the order was there for her to schedule it.  Immunizations: Abstracted.  Care Everywhere abstracted: No new results found.  Health Maintenance Due   Topic    Pap Smear with HPV Cotest     Mammogram    MAMMO:DUE PAP:ESTEFANY

## 2019-12-23 ENCOUNTER — PATIENT OUTREACH (OUTPATIENT)
Dept: ADMINISTRATIVE | Facility: HOSPITAL | Age: 64
End: 2019-12-23

## 2019-12-23 NOTE — PROGRESS NOTES
Called patient with reminder to schedule pap smear and mammogram. Patient not available, left voicemail.

## 2020-01-06 ENCOUNTER — OFFICE VISIT (OUTPATIENT)
Dept: FAMILY MEDICINE | Facility: CLINIC | Age: 65
End: 2020-01-06
Payer: MEDICAID

## 2020-01-06 VITALS
WEIGHT: 140.19 LBS | BODY MASS INDEX: 24.07 KG/M2 | SYSTOLIC BLOOD PRESSURE: 130 MMHG | TEMPERATURE: 98 F | HEART RATE: 80 BPM | DIASTOLIC BLOOD PRESSURE: 70 MMHG | OXYGEN SATURATION: 100 %

## 2020-01-06 DIAGNOSIS — M17.5 LOCALIZED SECONDARY OSTEOARTHROSIS OF KNEE: ICD-10-CM

## 2020-01-06 DIAGNOSIS — R07.89 RIGHT-SIDED CHEST WALL PAIN: Primary | ICD-10-CM

## 2020-01-06 PROCEDURE — 99214 OFFICE O/P EST MOD 30 MIN: CPT | Mod: S$PBB,,, | Performed by: FAMILY MEDICINE

## 2020-01-06 PROCEDURE — 99213 OFFICE O/P EST LOW 20 MIN: CPT | Mod: PBBFAC,PO | Performed by: FAMILY MEDICINE

## 2020-01-06 PROCEDURE — 99214 PR OFFICE/OUTPT VISIT, EST, LEVL IV, 30-39 MIN: ICD-10-PCS | Mod: S$PBB,,, | Performed by: FAMILY MEDICINE

## 2020-01-06 PROCEDURE — 99999 PR PBB SHADOW E&M-EST. PATIENT-LVL III: CPT | Mod: PBBFAC,,, | Performed by: FAMILY MEDICINE

## 2020-01-06 PROCEDURE — 99999 PR PBB SHADOW E&M-EST. PATIENT-LVL III: ICD-10-PCS | Mod: PBBFAC,,, | Performed by: FAMILY MEDICINE

## 2020-01-06 RX ORDER — DICLOFENAC SODIUM 10 MG/G
2 GEL TOPICAL DAILY
Qty: 1 TUBE | Refills: 2 | Status: SHIPPED | OUTPATIENT
Start: 2020-01-06 | End: 2024-01-04

## 2020-01-06 NOTE — PROGRESS NOTES
Chief Complaint:    Chief Complaint   Patient presents with    Abdominal Pain     pulled muscle        History of Present Illness:  She has some pain in the right lower part of the chest wall only when she moves a certain way she has she did something in that made it worse.  Also has knee pain occasionally bothers her      ROS:  Review of Systems   Constitutional: Negative for activity change, chills, fatigue, fever and unexpected weight change.   HENT: Negative for congestion, ear discharge, ear pain, hearing loss, postnasal drip and rhinorrhea.    Eyes: Negative for pain and visual disturbance.   Respiratory: Negative for cough, chest tightness and shortness of breath.    Cardiovascular: Negative for chest pain and palpitations.   Gastrointestinal: Negative for abdominal pain, diarrhea and vomiting.   Endocrine: Negative for heat intolerance.   Genitourinary: Negative for dysuria, flank pain, frequency and hematuria.   Musculoskeletal: Negative for back pain, gait problem and neck pain.   Skin: Negative for color change and rash.   Neurological: Negative for dizziness, tremors, seizures, numbness and headaches.   Psychiatric/Behavioral: Negative for agitation, hallucinations, self-injury, sleep disturbance and suicidal ideas. The patient is not nervous/anxious.        Past Medical History:   Diagnosis Date    Arthritis     Hyperlipidemia     Hypertension     Osteoporosis        Social History:  Social History     Socioeconomic History    Marital status:      Spouse name: Not on file    Number of children: Not on file    Years of education: Not on file    Highest education level: Not on file   Occupational History    Not on file   Social Needs    Financial resource strain: Somewhat hard    Food insecurity:     Worry: Never true     Inability: Never true    Transportation needs:     Medical: No     Non-medical: No   Tobacco Use    Smoking status: Former Smoker     Packs/day: 0.50     Years:  45.00     Pack years: 22.50     Types: Cigarettes     Last attempt to quit: 2019     Years since quittin.6    Smokeless tobacco: Never Used   Substance and Sexual Activity    Alcohol use: No     Alcohol/week: 0.0 standard drinks     Frequency: Never     Drinks per session: Patient refused     Binge frequency: Never    Drug use: No    Sexual activity: Never     Partners: Male     Birth control/protection: None   Lifestyle    Physical activity:     Days per week: 0 days     Minutes per session: 0 min    Stress: Only a little   Relationships    Social connections:     Talks on phone: More than three times a week     Gets together: Once a week     Attends [employer] service: Not on file     Active member of club or organization: No     Attends meetings of clubs or organizations: Never     Relationship status: Living with partner   Other Topics Concern    Not on file   Social History Narrative    Not on file       Family History:   family history includes Breast cancer in her paternal aunt.    Health Maintenance   Topic Date Due    Pap Smear with HPV Cotest  2018    Mammogram  2019    Lipid Panel  2020    Fecal Occult Blood Test (FOBT)/FitKit  2020    TETANUS VACCINE  2028    Hepatitis C Screening  Completed       Physical Exam:    Vital Signs  Temp: 97.5 °F (36.4 °C)  Temp src: Temporal  Pulse: 80  SpO2: 100 %  BP: 130/70  BP Location: Left arm  Patient Position: Sitting  Pain Score: 0-No pain  Height and Weight  Weight: 63.6 kg (140 lb 3.4 oz)]    Body mass index is 24.07 kg/m².    Physical Exam   Constitutional: She is oriented to person, place, and time. She appears well-developed.   HENT:   Mouth/Throat: Oropharynx is clear and moist.   Eyes: Pupils are equal, round, and reactive to light. Conjunctivae are normal.   Neck: Normal range of motion. Neck supple.   Cardiovascular: Normal rate, regular rhythm and normal heart sounds.   No murmur heard.  Tenderness of the  right lower chest wall   Pulmonary/Chest: Effort normal and breath sounds normal. No respiratory distress. She has no wheezes. She has no rales. She exhibits no tenderness.   Abdominal: Soft. She exhibits no distension and no mass. There is no tenderness. There is no guarding.   Musculoskeletal: She exhibits no edema.        Right knee: Tenderness found. Medial joint line and lateral joint line tenderness noted.        Left knee: Tenderness found. Medial joint line and lateral joint line tenderness noted.   Lymphadenopathy:     She has no cervical adenopathy.   Neurological: She is alert and oriented to person, place, and time. She has normal reflexes.   Skin: Skin is warm and dry.   Psychiatric: She has a normal mood and affect. Her behavior is normal. Judgment and thought content normal.         Assessment:      ICD-10-CM ICD-9-CM   1. Right-sided chest wall pain R07.89 786.52   2. Localized secondary osteoarthrosis of knee M17.5 715.26         Plan:        Chest wall pain coming from the ribcage may take Tylenol as needed  Voltaren gel prescribed for the knee pain        No orders of the defined types were placed in this encounter.      Current Outpatient Medications   Medication Sig Dispense Refill    citalopram (CELEXA) 20 MG tablet Take 1 tablet (20 mg total) by mouth once daily. 30 tablet 11    losartan (COZAAR) 50 MG tablet Take 1 tablet (50 mg total) by mouth once daily. 90 tablet 1    metoprolol succinate (TOPROL-XL) 25 MG 24 hr tablet TAKE 1/2 TABLET BY MOUTH EVERY DAY 75 tablet 3    potassium chloride (MICRO-K) 10 MEQ CpSR Take 2 capsules (20 mEq total) by mouth once daily. 180 capsule 1    triamterene-hydrochlorothiazide 37.5-25 mg (MAXZIDE-25) 37.5-25 mg per tablet Take 0.5 tablets by mouth once daily. 45 tablet 6    diclofenac sodium (VOLTAREN) 1 % Gel Apply 2 g topically once daily. 1 Tube 2     No current facility-administered medications for this visit.        There are no discontinued  medications.    No follow-ups on file.      Alonso Cortez MD

## 2020-01-13 ENCOUNTER — PATIENT OUTREACH (OUTPATIENT)
Dept: ADMINISTRATIVE | Facility: HOSPITAL | Age: 65
End: 2020-01-13

## 2020-01-28 RX ORDER — LOSARTAN POTASSIUM 50 MG/1
50 TABLET ORAL DAILY
Qty: 90 TABLET | Refills: 1 | Status: SHIPPED | OUTPATIENT
Start: 2020-01-28 | End: 2020-07-20

## 2020-03-25 RX ORDER — POTASSIUM CHLORIDE 750 MG/1
20 CAPSULE, EXTENDED RELEASE ORAL DAILY
Qty: 180 CAPSULE | Refills: 1 | Status: SHIPPED | OUTPATIENT
Start: 2020-03-25 | End: 2020-08-21 | Stop reason: SDUPTHER

## 2020-05-11 ENCOUNTER — PATIENT OUTREACH (OUTPATIENT)
Dept: ADMINISTRATIVE | Facility: HOSPITAL | Age: 65
End: 2020-05-11

## 2020-05-11 NOTE — PROGRESS NOTES
Outreached to patient to discuss overdue HM: Mammogram. No answer, left message for a return call.

## 2020-08-19 RX ORDER — TRIAMTERENE/HYDROCHLOROTHIAZID 37.5-25 MG
TABLET ORAL
Qty: 45 TABLET | Refills: 6 | OUTPATIENT
Start: 2020-08-19

## 2020-08-21 ENCOUNTER — OFFICE VISIT (OUTPATIENT)
Dept: FAMILY MEDICINE | Facility: CLINIC | Age: 65
End: 2020-08-21
Payer: MEDICAID

## 2020-08-21 ENCOUNTER — TELEPHONE (OUTPATIENT)
Dept: PHARMACY | Facility: CLINIC | Age: 65
End: 2020-08-21

## 2020-08-21 VITALS
OXYGEN SATURATION: 98 % | SYSTOLIC BLOOD PRESSURE: 138 MMHG | HEIGHT: 64 IN | DIASTOLIC BLOOD PRESSURE: 78 MMHG | HEART RATE: 76 BPM | BODY MASS INDEX: 22.71 KG/M2 | TEMPERATURE: 98 F | WEIGHT: 133.06 LBS

## 2020-08-21 DIAGNOSIS — F17.200 SMOKING: ICD-10-CM

## 2020-08-21 DIAGNOSIS — Z78.9 STATIN INTOLERANCE: ICD-10-CM

## 2020-08-21 DIAGNOSIS — Z00.00 WELL ADULT EXAM: Primary | ICD-10-CM

## 2020-08-21 DIAGNOSIS — I10 ESSENTIAL HYPERTENSION: ICD-10-CM

## 2020-08-21 DIAGNOSIS — E78.2 MIXED HYPERLIPIDEMIA: ICD-10-CM

## 2020-08-21 DIAGNOSIS — T46.6X5A MYALGIA DUE TO HMG COA REDUCTASE INHIBITOR: ICD-10-CM

## 2020-08-21 DIAGNOSIS — M79.10 MYALGIA DUE TO HMG COA REDUCTASE INHIBITOR: ICD-10-CM

## 2020-08-21 DIAGNOSIS — Z12.39 BREAST CANCER SCREENING: ICD-10-CM

## 2020-08-21 PROCEDURE — 99999 PR PBB SHADOW E&M-EST. PATIENT-LVL III: ICD-10-PCS | Mod: PBBFAC,,, | Performed by: FAMILY MEDICINE

## 2020-08-21 PROCEDURE — 99213 OFFICE O/P EST LOW 20 MIN: CPT | Mod: PBBFAC,PO | Performed by: FAMILY MEDICINE

## 2020-08-21 PROCEDURE — 99999 PR PBB SHADOW E&M-EST. PATIENT-LVL III: CPT | Mod: PBBFAC,,, | Performed by: FAMILY MEDICINE

## 2020-08-21 PROCEDURE — 99396 PR PREVENTIVE VISIT,EST,40-64: ICD-10-PCS | Mod: S$PBB,,, | Performed by: FAMILY MEDICINE

## 2020-08-21 PROCEDURE — 99396 PREV VISIT EST AGE 40-64: CPT | Mod: S$PBB,,, | Performed by: FAMILY MEDICINE

## 2020-08-21 RX ORDER — POTASSIUM CHLORIDE 750 MG/1
20 CAPSULE, EXTENDED RELEASE ORAL DAILY
Qty: 180 CAPSULE | Refills: 1 | Status: SHIPPED | OUTPATIENT
Start: 2020-08-21 | End: 2021-04-13

## 2020-08-21 RX ORDER — LOSARTAN POTASSIUM 50 MG/1
50 TABLET ORAL DAILY
Qty: 90 TABLET | Refills: 1 | Status: SHIPPED | OUTPATIENT
Start: 2020-08-21 | End: 2021-07-14

## 2020-08-21 RX ORDER — EVOLOCUMAB 140 MG/ML
140 INJECTION, SOLUTION SUBCUTANEOUS
Qty: 2 ML | Refills: 6 | Status: SHIPPED | OUTPATIENT
Start: 2020-08-21 | End: 2021-11-22 | Stop reason: SDUPTHER

## 2020-08-21 RX ORDER — TRIAMTERENE/HYDROCHLOROTHIAZID 37.5-25 MG
0.5 TABLET ORAL DAILY
Qty: 45 TABLET | Refills: 1 | Status: SHIPPED | OUTPATIENT
Start: 2020-08-21 | End: 2021-01-20

## 2020-08-21 RX ORDER — METOPROLOL SUCCINATE 25 MG/1
12.5 TABLET, EXTENDED RELEASE ORAL DAILY
Qty: 45 TABLET | Refills: 1 | Status: SHIPPED | OUTPATIENT
Start: 2020-08-21 | End: 2021-08-10

## 2020-08-21 RX ORDER — CITALOPRAM 20 MG/1
20 TABLET, FILM COATED ORAL DAILY
Qty: 90 TABLET | Refills: 1 | Status: SHIPPED | OUTPATIENT
Start: 2020-08-21 | End: 2021-09-10

## 2020-08-21 NOTE — PROGRESS NOTES
Chief Complaint:    Chief Complaint   Patient presents with    Medication Refill       History of Present Illness:    Presents today after long time:  She is back to smoking gain although she says not as much.  Blood pressure stable  Taking Celexa for stress.  Lipids been elevated she has had significant muscle pain taking statins in the past    ROS:  Review of Systems   Constitutional: Negative for activity change, chills, fatigue, fever and unexpected weight change.   HENT: Negative for congestion, ear discharge, ear pain, hearing loss, postnasal drip and rhinorrhea.    Eyes: Negative for pain and visual disturbance.   Respiratory: Negative for cough, chest tightness and shortness of breath.    Cardiovascular: Negative for chest pain and palpitations.   Gastrointestinal: Negative for abdominal pain, diarrhea and vomiting.   Endocrine: Negative for heat intolerance.   Genitourinary: Negative for dysuria, flank pain, frequency and hematuria.   Musculoskeletal: Negative for back pain, gait problem and neck pain.   Skin: Negative for color change and rash.   Neurological: Negative for dizziness, tremors, seizures, numbness and headaches.   Psychiatric/Behavioral: Negative for agitation, hallucinations, self-injury, sleep disturbance and suicidal ideas. The patient is not nervous/anxious.        Past Medical History:   Diagnosis Date    Arthritis     Hyperlipidemia     Hypertension     Osteoporosis        Social History:  Social History     Socioeconomic History    Marital status:      Spouse name: Not on file    Number of children: Not on file    Years of education: Not on file    Highest education level: Not on file   Occupational History    Not on file   Social Needs    Financial resource strain: Somewhat hard    Food insecurity     Worry: Never true     Inability: Never true    Transportation needs     Medical: No     Non-medical: No   Tobacco Use    Smoking status: Former Smoker      "Packs/day: 0.50     Years: 45.00     Pack years: 22.50     Types: Cigarettes     Quit date: 2019     Years since quittin.2    Smokeless tobacco: Never Used   Substance and Sexual Activity    Alcohol use: No     Alcohol/week: 0.0 standard drinks     Frequency: Never     Drinks per session: Patient refused     Binge frequency: Never    Drug use: No    Sexual activity: Never     Partners: Male     Birth control/protection: None   Lifestyle    Physical activity     Days per week: 0 days     Minutes per session: 0 min    Stress: Only a little   Relationships    Social connections     Talks on phone: More than three times a week     Gets together: Once a week     Attends Mosque service: Not on file     Active member of club or organization: No     Attends meetings of clubs or organizations: Never     Relationship status: Living with partner   Other Topics Concern    Not on file   Social History Narrative    Not on file       Family History:   family history includes Breast cancer in her paternal aunt.    Health Maintenance   Topic Date Due    Mammogram  2019    Lipid Panel  2020    TETANUS VACCINE  2028    Hepatitis C Screening  Completed       Physical Exam:    Vital Signs  Temp: 97.5 °F (36.4 °C)  Temp src: Temporal  Pulse: 76  SpO2: 98 %  BP: (!) 140/78  BP Location: Left arm  Patient Position: Sitting  Pain Score: 0-No pain  Height and Weight  Height: 5' 4" (162.6 cm)  Weight: 60.3 kg (133 lb 0.8 oz)  BSA (Calculated - sq m): 1.65 sq meters  BMI (Calculated): 22.8  Weight in (lb) to have BMI = 25: 145.3]    Body mass index is 22.84 kg/m².    Physical Exam  Constitutional:       Appearance: She is well-developed.   Eyes:      Conjunctiva/sclera: Conjunctivae normal.      Pupils: Pupils are equal, round, and reactive to light.   Neck:      Musculoskeletal: Normal range of motion and neck supple.   Cardiovascular:      Rate and Rhythm: Normal rate and regular rhythm.      Heart " sounds: Normal heart sounds. No murmur.   Pulmonary:      Effort: Pulmonary effort is normal. No respiratory distress.      Breath sounds: Normal breath sounds. No wheezing or rales.   Chest:      Chest wall: No tenderness.   Abdominal:      General: There is no distension.      Palpations: Abdomen is soft. There is no mass.      Tenderness: There is no abdominal tenderness. There is no guarding.   Musculoskeletal:         General: No tenderness.   Lymphadenopathy:      Cervical: No cervical adenopathy.   Skin:     General: Skin is warm and dry.   Neurological:      Mental Status: She is alert and oriented to person, place, and time.      Deep Tendon Reflexes: Reflexes are normal and symmetric.   Psychiatric:         Behavior: Behavior normal.         Thought Content: Thought content normal.         Judgment: Judgment normal.           Assessment:      ICD-10-CM ICD-9-CM   1. Well adult exam  Z00.00 V70.0   2. Essential hypertension  I10 401.9   3. Mixed hyperlipidemia  E78.2 272.2   4. Statin intolerance  Z78.9 995.27   5. 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 Breast cancer screening  Z12.39 V76.10         Plan:        Start on Repatha due to significant myalgia due to statin therapy.  Will see if her insurance will approve it.    Schedule a screening mammogram and and patient goes to gynecologist asked her to make an appointment to get a Pap smear.  Check labs as below  Follow-up 6 months      Orders Placed This Encounter   Procedures    Mammo Digital Screening Bilat    CBC auto differential    Comprehensive metabolic panel    Lipid Panel       Current Outpatient Medications   Medication Sig Dispense Refill    citalopram (CELEXA) 20 MG tablet Take 1 tablet (20 mg total) by mouth once daily. 90 tablet 1    diclofenac sodium (VOLTAREN) 1 % Gel Apply 2 g topically once daily. 1 Tube 2    losartan (COZAAR) 50 MG tablet Take 1 tablet (50 mg total) by mouth once daily.  90 tablet 1    metoprolol succinate (TOPROL-XL) 25 MG 24 hr tablet Take 0.5 tablets (12.5 mg total) by mouth once daily. 45 tablet 1    potassium chloride (MICRO-K) 10 MEQ CpSR Take 2 capsules (20 mEq total) by mouth once daily. 180 capsule 1    triamterene-hydrochlorothiazide 37.5-25 mg (MAXZIDE-25) 37.5-25 mg per tablet Take 0.5 tablets by mouth once daily. 45 tablet 1    evolocumab (REPATHA SURECLICK) 140 mg/mL PnIj Inject 1 mL (140 mg total) into the skin every 14 (fourteen) days. 1 Syringe 6     No current facility-administered medications for this visit.        Medications Discontinued During This Encounter   Medication Reason    triamterene-hydrochlorothiazide 37.5-25 mg (MAXZIDE-25) 37.5-25 mg per tablet Reorder    potassium chloride (MICRO-K) 10 MEQ CpSR Reorder    losartan (COZAAR) 50 MG tablet Reorder    metoprolol succinate (TOPROL-XL) 25 MG 24 hr tablet Reorder    citalopram (CELEXA) 20 MG tablet Reorder       No follow-ups on file.      Alonso Cortez MD

## 2020-08-26 ENCOUNTER — TELEPHONE (OUTPATIENT)
Dept: PHARMACY | Facility: CLINIC | Age: 65
End: 2020-08-26

## 2020-08-26 NOTE — TELEPHONE ENCOUNTER
DOCUMENTATION ONLY:  Repatha PA Case: [id number]   Status: Approved, Coverage Starts on: 8/26/2020 12:00:00 AM, Coverage Ends on: 2/22/2021 12:00:00 AM.

## 2020-08-26 NOTE — TELEPHONE ENCOUNTER
Call for Repatha initial consult. No answer, LVM.     Desmond Norris, PharmD  Clinical Pharmacist  [facility]  P: [phone]

## 2020-08-28 NOTE — TELEPHONE ENCOUNTER
Initial Repatha consult completed on 20. Repatha will be shipped on  to arrive at patient's home on  via [employer]. $0 copay. Patient will start Repatha on . Address confirmed. Confirmed 2 patient identifiers - name and . Therapy Appropriate.  --Injection experience: non    Counseled patient on administration directions:  - Inject 140 mg into the skin on the  and 15th of every month   - Take out of the refrigerator 30-60 minutes prior to injection.  - Wash hands before and after injection.  - Monthly RX will come with gauze, bandaids, and alcohol swabs.  - Patient may inject in either the tops of the thighs, abdomen (at least 2 inches away from belly button), or the outer part of upper arm. Patient was instructed to rotate injections sites.  - Patient is to wipe down the injection site with the alcohol pad, wait to dry. Gently squeeze the area of the cleaned skin and hold it firmly. Place the pen flat against the raised area of skin that is being squeezed, then push down on the button and release, in 10-15 seconds you will hear a click and the window will go from from clear to yellow, indicating injection is complete.  - Patient will use sharps container to dispose of used medication device. Once full, per [place] law, patient may lock the sharps container and place it in the trash. Patient can then contact the pharmacy and [facility] will replace the sharps at no additional charge.    Patient was counseled on possible side effects:  - Injection site reaction: redness, soreness, itching, bruising, which should resolve within 3-5 days.  - Flu-like symptoms    DDI: Medication list reviewed. No DDIs. Patient MUST contact myself or provider prior to starting any new OTC, herbal, or prescription drugs to avoid potential DDIs.    Allergies: Statins    Storage: Advised to keep refrigerated for stability until expiration on the box, but room temperature is ok if used within 30 days. Pt advised to keep in the fridge in  an area that will not freeze or get too warm.    Diet and exercise recommendations reviewed - heart healthy diet low in fats/fried foods and at least 30 minutes of exercise 5 days a week.    Patient did not have any further questions. Patient was advised to keep a calendar to stay compliant. Consultation included: indication; goals of treatment; administration; storage and handling; side effects; how to handle side effects; the importance of compliance; how to handle missed doses; the importance of laboratory monitoring; the importance of keeping all follow up appointments.  Patient understands to report any medication changes to [facility] and provider. All questions answered and addressed to patients satisfaction.[facility] to contact patient in 3 weeks for refills.

## 2020-09-01 ENCOUNTER — HOSPITAL ENCOUNTER (OUTPATIENT)
Dept: RADIOLOGY | Facility: HOSPITAL | Age: 65
Discharge: HOME | End: 2020-09-01
Attending: FAMILY MEDICINE
Payer: MEDICAID

## 2020-09-01 VITALS — WEIGHT: 132.94 LBS | HEIGHT: 64 IN | BODY MASS INDEX: 22.7 KG/M2

## 2020-09-01 DIAGNOSIS — Z12.39 BREAST CANCER SCREENING: ICD-10-CM

## 2020-09-01 PROCEDURE — 77063 MAMMO DIGITAL SCREENING BILAT WITH TOMOSYNTHESIS_CAD: ICD-10-PCS | Mod: 26,,, | Performed by: RADIOLOGY

## 2020-09-01 PROCEDURE — 77067 SCR MAMMO BI INCL CAD: CPT | Mod: TC

## 2020-09-01 PROCEDURE — 77067 MAMMO DIGITAL SCREENING BILAT WITH TOMOSYNTHESIS_CAD: ICD-10-PCS | Mod: 26,,, | Performed by: RADIOLOGY

## 2020-09-01 PROCEDURE — 77063 BREAST TOMOSYNTHESIS BI: CPT | Mod: 26,,, | Performed by: RADIOLOGY

## 2020-09-01 PROCEDURE — 77067 SCR MAMMO BI INCL CAD: CPT | Mod: 26,,, | Performed by: RADIOLOGY

## 2020-09-23 ENCOUNTER — TELEPHONE (OUTPATIENT)
Dept: PHARMACY | Facility: CLINIC | Age: 65
End: 2020-09-23

## 2020-09-30 ENCOUNTER — TELEPHONE (OUTPATIENT)
Dept: PHARMACY | Facility: CLINIC | Age: 65
End: 2020-09-30

## 2020-10-30 ENCOUNTER — PATIENT MESSAGE (OUTPATIENT)
Dept: ADMINISTRATIVE | Facility: HOSPITAL | Age: 65
End: 2020-10-30

## 2020-12-11 ENCOUNTER — PATIENT MESSAGE (OUTPATIENT)
Dept: OTHER | Facility: OTHER | Age: 65
End: 2020-12-11

## 2020-12-30 ENCOUNTER — PATIENT OUTREACH (OUTPATIENT)
Dept: ADMINISTRATIVE | Facility: HOSPITAL | Age: 65
End: 2020-12-30

## 2021-04-21 ENCOUNTER — OFFICE VISIT (OUTPATIENT)
Dept: FAMILY MEDICINE | Facility: CLINIC | Age: 66
End: 2021-04-21
Payer: MEDICAID

## 2021-04-21 VITALS
WEIGHT: 129.88 LBS | TEMPERATURE: 98 F | SYSTOLIC BLOOD PRESSURE: 121 MMHG | DIASTOLIC BLOOD PRESSURE: 58 MMHG | BODY MASS INDEX: 22.17 KG/M2 | OXYGEN SATURATION: 99 % | HEIGHT: 64 IN | HEART RATE: 91 BPM

## 2021-04-21 DIAGNOSIS — S33.9XXA SPRAIN OF LIGAMENT OF LUMBOSACRAL JOINT, INITIAL ENCOUNTER: Primary | ICD-10-CM

## 2021-04-21 DIAGNOSIS — F43.9 STRESS: ICD-10-CM

## 2021-04-21 DIAGNOSIS — J01.00 ACUTE NON-RECURRENT MAXILLARY SINUSITIS: ICD-10-CM

## 2021-04-21 PROCEDURE — 99213 OFFICE O/P EST LOW 20 MIN: CPT | Mod: PBBFAC,PO | Performed by: FAMILY MEDICINE

## 2021-04-21 PROCEDURE — 99999 PR PBB SHADOW E&M-EST. PATIENT-LVL III: ICD-10-PCS | Mod: PBBFAC,,, | Performed by: FAMILY MEDICINE

## 2021-04-21 PROCEDURE — 99214 PR OFFICE/OUTPT VISIT, EST, LEVL IV, 30-39 MIN: ICD-10-PCS | Mod: S$PBB,,, | Performed by: FAMILY MEDICINE

## 2021-04-21 PROCEDURE — 99999 PR PBB SHADOW E&M-EST. PATIENT-LVL III: CPT | Mod: PBBFAC,,, | Performed by: FAMILY MEDICINE

## 2021-04-21 PROCEDURE — 99214 OFFICE O/P EST MOD 30 MIN: CPT | Mod: S$PBB,,, | Performed by: FAMILY MEDICINE

## 2021-04-21 RX ORDER — METHYLPREDNISOLONE 4 MG/1
TABLET ORAL
Qty: 1 PACKAGE | Refills: 0 | Status: SHIPPED | OUTPATIENT
Start: 2021-04-21 | End: 2022-05-25

## 2021-04-21 RX ORDER — AMOXICILLIN AND CLAVULANATE POTASSIUM 875; 125 MG/1; MG/1
1 TABLET, FILM COATED ORAL EVERY 12 HOURS
Qty: 14 TABLET | Refills: 0 | Status: SHIPPED | OUTPATIENT
Start: 2021-04-21 | End: 2021-04-28

## 2021-04-21 RX ORDER — MELOXICAM 7.5 MG/1
7.5 TABLET ORAL DAILY
Qty: 30 TABLET | Refills: 0 | Status: SHIPPED | OUTPATIENT
Start: 2021-04-21 | End: 2022-05-25

## 2021-05-06 ENCOUNTER — PATIENT MESSAGE (OUTPATIENT)
Dept: RESEARCH | Facility: HOSPITAL | Age: 66
End: 2021-05-06

## 2021-09-29 DIAGNOSIS — Z12.31 OTHER SCREENING MAMMOGRAM: ICD-10-CM

## 2021-11-22 ENCOUNTER — OFFICE VISIT (OUTPATIENT)
Dept: FAMILY MEDICINE | Facility: CLINIC | Age: 66
End: 2021-11-22
Payer: MEDICAID

## 2021-11-22 VITALS
WEIGHT: 131.38 LBS | BODY MASS INDEX: 22.43 KG/M2 | OXYGEN SATURATION: 98 % | DIASTOLIC BLOOD PRESSURE: 62 MMHG | SYSTOLIC BLOOD PRESSURE: 124 MMHG | HEIGHT: 64 IN | TEMPERATURE: 98 F | HEART RATE: 90 BPM

## 2021-11-22 DIAGNOSIS — Z12.11 COLON CANCER SCREENING: ICD-10-CM

## 2021-11-22 DIAGNOSIS — T46.6X5A MYALGIA DUE TO HMG COA REDUCTASE INHIBITOR: ICD-10-CM

## 2021-11-22 DIAGNOSIS — F17.210 NICOTINE DEPENDENCE, CIGARETTES, UNCOMPLICATED: ICD-10-CM

## 2021-11-22 DIAGNOSIS — E78.2 MIXED HYPERLIPIDEMIA: ICD-10-CM

## 2021-11-22 DIAGNOSIS — Z00.00 WELL ADULT EXAM: Primary | ICD-10-CM

## 2021-11-22 DIAGNOSIS — L30.9 DERMATITIS: ICD-10-CM

## 2021-11-22 DIAGNOSIS — I10 PRIMARY HYPERTENSION: ICD-10-CM

## 2021-11-22 DIAGNOSIS — M79.10 MYALGIA DUE TO HMG COA REDUCTASE INHIBITOR: ICD-10-CM

## 2021-11-22 DIAGNOSIS — M25.512 ACUTE PAIN OF LEFT SHOULDER: ICD-10-CM

## 2021-11-22 DIAGNOSIS — F17.200 SMOKING: ICD-10-CM

## 2021-11-22 PROCEDURE — 99396 PR PREVENTIVE VISIT,EST,40-64: ICD-10-PCS | Mod: S$PBB,,, | Performed by: FAMILY MEDICINE

## 2021-11-22 PROCEDURE — 99215 OFFICE O/P EST HI 40 MIN: CPT | Mod: PBBFAC,PO | Performed by: FAMILY MEDICINE

## 2021-11-22 PROCEDURE — 99999 PR PBB SHADOW E&M-EST. PATIENT-LVL V: CPT | Mod: PBBFAC,,, | Performed by: FAMILY MEDICINE

## 2021-11-22 PROCEDURE — 99999 PR PBB SHADOW E&M-EST. PATIENT-LVL V: ICD-10-PCS | Mod: PBBFAC,,, | Performed by: FAMILY MEDICINE

## 2021-11-22 PROCEDURE — 99396 PREV VISIT EST AGE 40-64: CPT | Mod: S$PBB,,, | Performed by: FAMILY MEDICINE

## 2021-11-22 RX ORDER — EVOLOCUMAB 140 MG/ML
140 INJECTION, SOLUTION SUBCUTANEOUS
Qty: 2 ML | Refills: 6 | Status: SHIPPED | OUTPATIENT
Start: 2021-11-22 | End: 2023-07-17

## 2021-11-22 RX ORDER — TRIAMCINOLONE ACETONIDE 1 MG/G
CREAM TOPICAL 2 TIMES DAILY
Qty: 1 EACH | Refills: 1 | Status: SHIPPED | OUTPATIENT
Start: 2021-11-22 | End: 2024-01-04

## 2021-12-02 ENCOUNTER — SPECIALTY PHARMACY (OUTPATIENT)
Dept: PHARMACY | Facility: CLINIC | Age: 66
End: 2021-12-02

## 2021-12-06 ENCOUNTER — HOSPITAL ENCOUNTER (OUTPATIENT)
Dept: RADIOLOGY | Facility: HOSPITAL | Age: 66
Discharge: HOME | End: 2021-12-06
Attending: FAMILY MEDICINE
Payer: MEDICAID

## 2021-12-06 DIAGNOSIS — F17.210 NICOTINE DEPENDENCE, CIGARETTES, UNCOMPLICATED: ICD-10-CM

## 2021-12-06 PROCEDURE — 71271 CT CHEST LUNG SCREENING LOW DOSE: ICD-10-PCS | Mod: 26,,, | Performed by: RADIOLOGY

## 2021-12-06 PROCEDURE — 71271 CT THORAX LUNG CANCER SCR C-: CPT | Mod: TC

## 2021-12-06 PROCEDURE — 71271 CT THORAX LUNG CANCER SCR C-: CPT | Mod: 26,,, | Performed by: RADIOLOGY

## 2021-12-08 NOTE — TELEPHONE ENCOUNTER
[facility] received a denial from the PA submission for Repatha Sureclick from the patients insurance company based on the following reason:    We denied your request because we did not see certain details about your use and treatment.   We see that this request is for a drug called Repatha Sureclick for your use (atherosclerotic cardiovascular disease).  We may consider approval of this drug in a certain situation (when you have had a treatment failure with at least one preferred product, such a colestipol granules, granule packet, tablet; ezetimibe; fenofibrate nanocrystallized tablet (generic Tricor 48 mg and 145 mg), fenofibrate capsule, tablet (generic Lofibra); gemfibrozil tablet; and niacin ER tablet; or when you have had an intolerable side effect to at least one preferred product; or when you have documented contraindication(s) to the preferred products that are appropriate to use for the condition being treated.    Staff message sent to [employer] to see how they would like [facility] to proceed.

## 2021-12-16 NOTE — TELEPHONE ENCOUNTER
Staff message sent to [employer] since appeal was denied for the following reason:    [employer] has denied the appeal request for Repatha Sureclick. They do not see where the patient has tried and failed more than one statin, Zetia or a bile acid sequestrant.      [facility] is waiting to see if MD wants us to work on helping her get Repatha or if he wants to start a new medication.

## 2021-12-18 LAB — NONINV COLON CA DNA+OCC BLD SCRN STL QL: NEGATIVE

## 2021-12-23 NOTE — TELEPHONE ENCOUNTER
Incoming call from patient returning [facility] April's call. Patient says she has not heard back from MD regarding new cholesterol medication. Patient said she will check with [employer] today and will call [facility] when she hears back from them.

## 2022-01-07 ENCOUNTER — PATIENT MESSAGE (OUTPATIENT)
Dept: FAMILY MEDICINE | Facility: CLINIC | Age: 67
End: 2022-01-07
Payer: MEDICAID

## 2022-01-12 NOTE — TELEPHONE ENCOUNTER
Incoming call from pt returning phone call. Pt states cholesterol medication has not yet been changed she is waiting on her doctor to respond to her [employer] message, as charted.

## 2022-01-14 RX ORDER — TRIAMTERENE/HYDROCHLOROTHIAZID 37.5-25 MG
TABLET ORAL
Qty: 15 TABLET | Refills: 1 | Status: SHIPPED | OUTPATIENT
Start: 2022-01-14 | End: 2022-03-21

## 2022-01-14 RX ORDER — LOSARTAN POTASSIUM 50 MG/1
50 TABLET ORAL DAILY
Qty: 30 TABLET | Refills: 1 | Status: SHIPPED | OUTPATIENT
Start: 2022-01-14 | End: 2022-02-14

## 2022-01-14 NOTE — TELEPHONE ENCOUNTER
Care Due:                  Date            Visit Type   Department     Provider  --------------------------------------------------------------------------------                                             [facility]  Last Visit: 11-      None         MEDICINE       Alonso Cortez                                           [facility]  Next Visit: 05-      None         MEDICINE       Arif  Cortez                                                            Last  Test          Frequency    Reason                     Performed    Due Date  --------------------------------------------------------------------------------    CMP.........  12 months..  losartan,                  Not Found    Overdue                             triamterene-hydrochloroth                             iazide...................    Powered by [employer]. Reference number: [id number].   1/14/2022 8:50:31 AM CST

## 2022-01-31 ENCOUNTER — LAB VISIT (OUTPATIENT)
Dept: LAB | Facility: HOSPITAL | Age: 67
End: 2022-01-31
Attending: FAMILY MEDICINE
Payer: MEDICAID

## 2022-01-31 DIAGNOSIS — Z12.11 COLON CANCER SCREENING: ICD-10-CM

## 2022-01-31 DIAGNOSIS — T46.6X5A MYALGIA DUE TO HMG COA REDUCTASE INHIBITOR: ICD-10-CM

## 2022-01-31 DIAGNOSIS — M25.512 ACUTE PAIN OF LEFT SHOULDER: ICD-10-CM

## 2022-01-31 DIAGNOSIS — F17.210 NICOTINE DEPENDENCE, CIGARETTES, UNCOMPLICATED: ICD-10-CM

## 2022-01-31 DIAGNOSIS — I10 PRIMARY HYPERTENSION: ICD-10-CM

## 2022-01-31 DIAGNOSIS — F17.200 SMOKING: ICD-10-CM

## 2022-01-31 DIAGNOSIS — M79.10 MYALGIA DUE TO HMG COA REDUCTASE INHIBITOR: ICD-10-CM

## 2022-01-31 DIAGNOSIS — L30.9 DERMATITIS: ICD-10-CM

## 2022-01-31 DIAGNOSIS — Z00.00 WELL ADULT EXAM: ICD-10-CM

## 2022-01-31 DIAGNOSIS — E78.2 MIXED HYPERLIPIDEMIA: ICD-10-CM

## 2022-01-31 LAB
ALBUMIN SERPL BCP-MCNC: 4 G/DL (ref 3.5–5.2)
ALP SERPL-CCNC: 71 U/L (ref 55–135)
ALT SERPL W/O P-5'-P-CCNC: 24 U/L (ref 10–44)
ANION GAP SERPL CALC-SCNC: 10 MMOL/L (ref 8–16)
AST SERPL-CCNC: 29 U/L (ref 10–40)
BILIRUB SERPL-MCNC: 0.5 MG/DL (ref 0.1–1)
BUN SERPL-MCNC: 14 MG/DL (ref 8–23)
CALCIUM SERPL-MCNC: 9.7 MG/DL (ref 8.7–10.5)
CHLORIDE SERPL-SCNC: 104 MMOL/L (ref 95–110)
CO2 SERPL-SCNC: 27 MMOL/L (ref 23–29)
CREAT SERPL-MCNC: 0.8 MG/DL (ref 0.5–1.4)
EST. GFR  (AFRICAN AMERICAN): >60 ML/MIN/1.73 M^2
EST. GFR  (NON AFRICAN AMERICAN): >60 ML/MIN/1.73 M^2
ESTIMATED AVG GLUCOSE: 100 MG/DL (ref 68–131)
GLUCOSE SERPL-MCNC: 92 MG/DL (ref 70–110)
HBA1C MFR BLD: 5.1 % (ref 4–5.6)
POTASSIUM SERPL-SCNC: 4.2 MMOL/L (ref 3.5–5.1)
PROT SERPL-MCNC: 7.9 G/DL (ref 6–8.4)
SODIUM SERPL-SCNC: 141 MMOL/L (ref 136–145)

## 2022-01-31 PROCEDURE — 36415 COLL VENOUS BLD VENIPUNCTURE: CPT | Mod: PO | Performed by: FAMILY MEDICINE

## 2022-01-31 PROCEDURE — 80053 COMPREHEN METABOLIC PANEL: CPT | Performed by: FAMILY MEDICINE

## 2022-01-31 PROCEDURE — 83036 HEMOGLOBIN GLYCOSYLATED A1C: CPT | Performed by: FAMILY MEDICINE

## 2022-01-31 PROCEDURE — 87389 HIV-1 AG W/HIV-1&-2 AB AG IA: CPT | Performed by: FAMILY MEDICINE

## 2022-02-02 LAB — HIV 1+2 AB+HIV1 P24 AG SERPL QL IA: NEGATIVE

## 2022-04-14 RX ORDER — LOSARTAN POTASSIUM 50 MG/1
TABLET ORAL
Qty: 30 TABLET | Refills: 1 | OUTPATIENT
Start: 2022-04-14

## 2022-04-14 NOTE — TELEPHONE ENCOUNTER
Quick DC. Inappropriate Request    Refill Authorization Note   Emily Chinchillacarey  is requesting a refill authorization.  Brief Assessment and Rationale for Refill:  Quick Discontinue  Medication Therapy Plan:  Per Epic Data, on 2/14/22 there was a dose adjustment from losartan 50mg to losartan 25mg by PCP.    Medication Reconciliation Completed:  No      Comments:     Note composed:12:04 PM 04/14/2022

## 2022-04-14 NOTE — TELEPHONE ENCOUNTER
No new care gaps identified.  Powered by [employer]. Reference number: [id number].   4/14/2022 9:08:20 AM CDT

## 2022-04-18 ENCOUNTER — PATIENT MESSAGE (OUTPATIENT)
Dept: ADMINISTRATIVE | Facility: OTHER | Age: 67
End: 2022-04-18
Payer: MEDICAID

## 2022-04-26 ENCOUNTER — PATIENT MESSAGE (OUTPATIENT)
Dept: ADMINISTRATIVE | Facility: HOSPITAL | Age: 67
End: 2022-04-26
Payer: MEDICAID

## 2022-05-02 ENCOUNTER — PATIENT MESSAGE (OUTPATIENT)
Dept: ADMINISTRATIVE | Facility: HOSPITAL | Age: 67
End: 2022-05-02
Payer: MEDICAID

## 2022-05-12 RX ORDER — LOSARTAN POTASSIUM 50 MG/1
TABLET ORAL
Qty: 30 TABLET | Refills: 1 | OUTPATIENT
Start: 2022-05-12

## 2022-05-12 NOTE — TELEPHONE ENCOUNTER
Refill Authorization Note   Emily Orlin  is requesting a refill authorization.  Brief Assessment and Rationale for Refill:  Quick Discontinue     Medication Therapy Plan:       Medication Reconciliation Completed: No   Comments:     No Care Gaps recommended.     Note composed:4:22 PM 05/12/2022

## 2022-05-12 NOTE — TELEPHONE ENCOUNTER
No new care gaps identified.  [facility] Embedded Care Gaps. Reference number: [id number]. 5/12/2022   3:06:13 PM CDT

## 2022-05-20 RX ORDER — TRIAMTERENE/HYDROCHLOROTHIAZID 37.5-25 MG
TABLET ORAL
Qty: 45 TABLET | Refills: 1 | Status: SHIPPED | OUTPATIENT
Start: 2022-05-20 | End: 2022-07-19

## 2022-05-20 NOTE — TELEPHONE ENCOUNTER
No new care gaps identified.  [facility] Embedded Care Gaps. Reference number: [id number]. 5/20/2022   8:19:37 AM CDT

## 2022-05-20 NOTE — TELEPHONE ENCOUNTER
Refill Authorization Note   Emily Orlin  is requesting a refill authorization.  Brief Assessment and Rationale for Refill:  Approve     Medication Therapy Plan:       Medication Reconciliation Completed: No   Comments:     No Care Gaps recommended.     Note composed:1:47 PM 05/20/2022

## 2022-05-25 ENCOUNTER — PATIENT MESSAGE (OUTPATIENT)
Dept: FAMILY MEDICINE | Facility: CLINIC | Age: 67
End: 2022-05-25

## 2022-05-25 ENCOUNTER — OFFICE VISIT (OUTPATIENT)
Dept: FAMILY MEDICINE | Facility: CLINIC | Age: 67
End: 2022-05-25
Payer: MEDICAID

## 2022-05-25 ENCOUNTER — PATIENT MESSAGE (OUTPATIENT)
Dept: ADMINISTRATIVE | Facility: OTHER | Age: 67
End: 2022-05-25
Payer: MEDICAID

## 2022-05-25 DIAGNOSIS — M79.10 MYALGIA DUE TO HMG COA REDUCTASE INHIBITOR: ICD-10-CM

## 2022-05-25 DIAGNOSIS — S83.422A SPRAIN OF LATERAL COLLATERAL LIGAMENT OF LEFT KNEE, INITIAL ENCOUNTER: ICD-10-CM

## 2022-05-25 DIAGNOSIS — M17.5 LOCALIZED SECONDARY OSTEOARTHROSIS OF KNEE: ICD-10-CM

## 2022-05-25 DIAGNOSIS — I10 HYPERTENSION NOT AT GOAL: Primary | ICD-10-CM

## 2022-05-25 DIAGNOSIS — L25.9 CONTACT DERMATITIS, UNSPECIFIED CONTACT DERMATITIS TYPE, UNSPECIFIED TRIGGER: ICD-10-CM

## 2022-05-25 DIAGNOSIS — Z12.31 BREAST CANCER SCREENING BY MAMMOGRAM: ICD-10-CM

## 2022-05-25 DIAGNOSIS — T46.6X5A MYALGIA DUE TO HMG COA REDUCTASE INHIBITOR: ICD-10-CM

## 2022-05-25 DIAGNOSIS — Z78.9 STATIN INTOLERANCE: ICD-10-CM

## 2022-05-25 PROCEDURE — 99214 PR OFFICE/OUTPT VISIT, EST, LEVL IV, 30-39 MIN: ICD-10-PCS | Mod: S$PBB,,, | Performed by: FAMILY MEDICINE

## 2022-05-25 PROCEDURE — 3008F BODY MASS INDEX DOCD: CPT | Mod: CPTII,,, | Performed by: FAMILY MEDICINE

## 2022-05-25 PROCEDURE — 1159F MED LIST DOCD IN RCRD: CPT | Mod: CPTII,,, | Performed by: FAMILY MEDICINE

## 2022-05-25 PROCEDURE — 3044F HG A1C LEVEL LT 7.0%: CPT | Mod: CPTII,,, | Performed by: FAMILY MEDICINE

## 2022-05-25 PROCEDURE — 99214 OFFICE O/P EST MOD 30 MIN: CPT | Mod: PBBFAC,PO | Performed by: FAMILY MEDICINE

## 2022-05-25 PROCEDURE — 99999 PR PBB SHADOW E&M-EST. PATIENT-LVL IV: CPT | Mod: PBBFAC,,, | Performed by: FAMILY MEDICINE

## 2022-05-25 PROCEDURE — 99999 PR PBB SHADOW E&M-EST. PATIENT-LVL IV: ICD-10-PCS | Mod: PBBFAC,,, | Performed by: FAMILY MEDICINE

## 2022-05-25 PROCEDURE — 1160F RVW MEDS BY RX/DR IN RCRD: CPT | Mod: CPTII,,, | Performed by: FAMILY MEDICINE

## 2022-05-25 PROCEDURE — 3008F PR BODY MASS INDEX (BMI) DOCUMENTED: ICD-10-PCS | Mod: CPTII,,, | Performed by: FAMILY MEDICINE

## 2022-05-25 PROCEDURE — 4010F ACE/ARB THERAPY RXD/TAKEN: CPT | Mod: CPTII,,, | Performed by: FAMILY MEDICINE

## 2022-05-25 PROCEDURE — 1159F PR MEDICATION LIST DOCUMENTED IN MEDICAL RECORD: ICD-10-PCS | Mod: CPTII,,, | Performed by: FAMILY MEDICINE

## 2022-05-25 PROCEDURE — 1160F PR REVIEW ALL MEDS BY PRESCRIBER/CLIN PHARMACIST DOCUMENTED: ICD-10-PCS | Mod: CPTII,,, | Performed by: FAMILY MEDICINE

## 2022-05-25 PROCEDURE — 4010F PR ACE/ARB THEARPY RXD/TAKEN: ICD-10-PCS | Mod: CPTII,,, | Performed by: FAMILY MEDICINE

## 2022-05-25 PROCEDURE — 99214 OFFICE O/P EST MOD 30 MIN: CPT | Mod: S$PBB,,, | Performed by: FAMILY MEDICINE

## 2022-05-25 PROCEDURE — 3044F PR MOST RECENT HEMOGLOBIN A1C LEVEL <7.0%: ICD-10-PCS | Mod: CPTII,,, | Performed by: FAMILY MEDICINE

## 2022-05-25 RX ORDER — LOSARTAN POTASSIUM 100 MG/1
50 TABLET ORAL DAILY
Qty: 90 TABLET | Refills: 6 | Status: SHIPPED | OUTPATIENT
Start: 2022-05-25 | End: 2022-06-06 | Stop reason: SDUPTHER

## 2022-05-26 ENCOUNTER — HOSPITAL ENCOUNTER (OUTPATIENT)
Dept: RADIOLOGY | Facility: HOSPITAL | Age: 67
Discharge: HOME | End: 2022-05-26
Attending: FAMILY MEDICINE
Payer: MEDICAID

## 2022-05-26 DIAGNOSIS — M17.5 LOCALIZED SECONDARY OSTEOARTHROSIS OF KNEE: ICD-10-CM

## 2022-05-26 DIAGNOSIS — S83.422A SPRAIN OF LATERAL COLLATERAL LIGAMENT OF LEFT KNEE, INITIAL ENCOUNTER: ICD-10-CM

## 2022-05-26 PROCEDURE — 73560 X-RAY EXAM OF KNEE 1 OR 2: CPT | Mod: 26,50,, | Performed by: RADIOLOGY

## 2022-05-26 PROCEDURE — 73560 XR KNEE AP STANDING WITH BOTH LATERAL: ICD-10-PCS | Mod: 26,50,, | Performed by: RADIOLOGY

## 2022-05-26 PROCEDURE — 73560 X-RAY EXAM OF KNEE 1 OR 2: CPT | Mod: TC,50,FY,PO

## 2022-05-27 ENCOUNTER — CLINICAL SUPPORT (OUTPATIENT)
Dept: REHABILITATION | Facility: HOSPITAL | Age: 67
End: 2022-05-27
Payer: MEDICAID

## 2022-05-27 DIAGNOSIS — G89.29 CHRONIC PAIN OF LEFT KNEE: ICD-10-CM

## 2022-05-27 DIAGNOSIS — M17.5 LOCALIZED SECONDARY OSTEOARTHROSIS OF KNEE: ICD-10-CM

## 2022-05-27 DIAGNOSIS — S83.422A SPRAIN OF LATERAL COLLATERAL LIGAMENT OF LEFT KNEE, INITIAL ENCOUNTER: ICD-10-CM

## 2022-05-27 DIAGNOSIS — R53.1 DECREASED STRENGTH: ICD-10-CM

## 2022-05-27 DIAGNOSIS — M25.562 CHRONIC PAIN OF LEFT KNEE: ICD-10-CM

## 2022-05-27 PROCEDURE — 97161 PT EVAL LOW COMPLEX 20 MIN: CPT

## 2022-05-27 PROCEDURE — 97110 THERAPEUTIC EXERCISES: CPT

## 2022-05-27 NOTE — PLAN OF CARE
[facility]   Physical Therapy Initial Evaluation     Date: 5/27/2022   Name: Emily Ordaz  Clinic Number: [id number]    Therapy Diagnosis:   Encounter Diagnoses   Name Primary?    Sprain of lateral collateral ligament of left knee, initial encounter     Localized secondary osteoarthrosis of knee     Chronic pain of left knee     Decreased strength      Physician: Alonso Cortez MD    Physician Orders: PT Eval and Treat   Medical Diagnosis from Referral:   S83.422A (ICD-10-CM) - Sprain of lateral collateral ligament of left knee, initial encounter   M17.5 (ICD-10-CM) - Localized secondary osteoarthrosis of knee     Evaluation Date: 5/27/2022  Authorization Period Expiration: 5/25/2023   Plan of Care Expiration: July 8, 2022  Progress Note Due: June 26, 2022 or 10th visit   Visit # / Visits authorized: 1/1   FOTO: 1/3    Precautions: Standard and osteoporosis      Time In: 10:00  Time Out: 11:00  Total Appointment Time (timed & untimed codes): 60 minutes      SUBJECTIVE     Date of onset: first week of April 2022 her R knee was bothering her and then after vacationing in the [facility] a couple of weeks later her L knee began hurting        History of current condition - Emily reports: her R knee has always bothered her but now it feels better and does not hurt.  She states this knee pain used to be on her medial R knee.  Now the lateral/inferior portion of her L knee is hurting her after walking the mountains in [place] in April.  She believes this may be due to overcompensating while walking to protect her R knee pain.       Her MD would like for her to get a knee brace but she has not received the knee brace yet.    She states she does have some back pain,especially when standing while washing dishes.  She states she has had pain go down the back of her L thigh about 6 years ago. She had a steroid shot in her back 6 years ago.  She does not have pain going down the back  of her leg, no foot drop or N/T today and has never had these symptoms in the past. She states she does feel like her knees are going to give out. She is not having back pain today or in the previous weeks.     Falls: no falls in the past year     Imaging, xray on B knees: Minimal narrowing involving either medial compartment.  No fracture or dislocation.  No joint effusion.  No patellar tilt.  Normal appearance of the soft tissues.  Osteopenia.    Prior Therapy: years ago had physical therapy on her finger when she broke it     Social History: lives with family, raising grandchildren 12,15, 18 years old   Occupation: retired   Prior Level of Function: R knee pain before going to [place] in [place] while ambulating short distances    Current Level of Function: now has L knee pain after mountains in [place] and no longer has R knee pain while ambulating short distances     Pain:  Current 2/10, worst 3/10, best 2/10   Location: left lateral inferior knee    Description: achy   Aggravating Factors: stairs (has been having to use a step to gait pattern), walking (wants a handicap tag, has to lean on buggy at walker), difficulty with sit to stands   Easing Factors: sitting, took a steroid pack and it did not do anything, tylenol arthritis helps (takes one and then does not take for a couple of days because it decreases her pain so much)    Patients goals: start walking with decreased pain because she is qitting smoking, she would like to walk a mile to a mile and a half to start out      Medical History:   Past Medical History:   Diagnosis Date    Arthritis     Hyperlipidemia     Hypertension     Osteoporosis        Surgical History:   Emily Talley Abadjenelle  has no past surgical history on file.    Medications:   Emily has a current medication list which includes the following prescription(s): cholecalciferol (vitamin d3), citalopram, diclofenac sodium, repatha sureclick, losartan, metoprolol succinate, potassium,  potassium chloride, triamcinolone acetonide 0.1%, triamterene-hydrochlorothiazide 37.5-25 mg, and turmeric.    Allergies:   Review of patient's allergies indicates:   Allergen Reactions    Statins-hmg-coa reductase inhibitors      Leg cramps and pain           OBJECTIVE       CMS Impairment/Limitation/Restriction for FOTO Knee Survey    Therapist reviewed FOTO scores for Emily Ordaz on 5/27/2022.   FOTO documents entered into [employer] - see Media section.    Limitation Score: 56%          Gait: R lateral shift at trunk, decreased L stance phase         Strength:       L/E MMT Right Left Pain/Dysfunction with Movement   Hip Flexion 4+/5 4/5    Hip Extension 4-/5 4-/5    Hip abductors 4/5 4-/5    Knee Flexion 4+/5 3-/5    Knee Extension 4+/5 3+/5          Range of Motion:    Lumbar AROM/PROM % Pain/Dysfunction with Movement   Flexion WFL Increased tightness    Extension  WFL none   R/L SBing   WFL none   R/L rotation  WFL none        Knee Right Left Pain/Dysfunction with Movement   AROM/PROM      flexion  138'    extension   WFL Increased pain       Special Test:  Anterior Drawer neg Posterior Drawer neg     Valgus stress  neg Varus stress  neg     Candie  neg        Joint Mobility: increased tightness found with patella superior glides,     Palpation: tenderness to palpation on the L lateral knee       TREATMENT     Total Treatment time (time-based codes) separate from Evaluation: 30 minutes      Emily received the treatments listed below:      therapeutic exercises to develop strength, endurance, ROM and flexibility for 30 minutes including:  Quad sets 10 x 10 seconds ea   Heel slides 10 x 5 seconds ea       PATIENT EDUCATION AND HOME EXERCISES     Education provided:    - HEP given  Quad sets 10 x 10 seconds ea   Heel slides 10 x 5 seconds ea     Written Home Exercises Provided: yes. Exercises were reviewed and Emily was able to demonstrate them prior to the end of the session.  Emily demonstrated good   understanding of the education provided. See EMR under Patient Instructions for exercises provided during therapy sessions.    ASSESSMENT     Emily is a 66 y.o. female referred to outpatient Physical Therapy with a medical diagnosis of pain in left knee. Patient presents with decreased strength, ROM, endurance, and increased pain.  These impairments are limiting their ability to perform standing, squatting, transferring, and ambulating activities.    Patient prognosis is Good.   Patient will benefit from skilled outpatient Physical Therapy to address the deficits stated above and in the chart below, provide patient /family education, and to maximize patientt's level of independence.     Plan of care discussed with patient: Yes  Patient's spiritual, cultural and educational needs considered and patient is agreeable to the plan of care and goals as stated below:     Anticipated Barriers for therapy: none     Medical Necessity is demonstrated by the following  History  Co-morbidities and personal factors that may impact the plan of care Co-morbidities:   osteoporosis    Personal Factors:   no deficits     moderate   Examination  Body Structures and Functions, activity limitations and participation restrictions that may impact the plan of care Body Regions:   lower extremities    Body Systems:    gross symmetry  ROM  strength  gross coordinated movement  balance  gait  transfers    Participation Restrictions:   None     Activity limitations:   Learning and applying knowledge  no deficits    General Tasks and Commands  no deficits    Communication  no deficits    Mobility  no deficits    Self care  no deficits    Domestic Life  no deficits    Interactions/Relationships  no deficits    Life Areas  no deficits    Community and Social Life  community life  recreation and leisure         high   Clinical Presentation stable and uncomplicated low   Decision Making/ Complexity Score: low     Goals: Reviewed:5/27/2022     Short Term Goals: In 3 weeks:  1.Patient to be educated on HEP.  2. Patient to increase strength B LE by 1/2 grade, in order to improve endurance and increase ability to ambulate for increased time.  3.Patient to have decreased pain to 1/10 at worst, to improve QOL.  4. Patient will ambulate up to 4 houses and the [employer] parking lot with 1/10 pain.   5.Patient to improve score on the FOTO, to improve QOL.      Long Term Goals: In 6 weeks  1. Patient to demonstrate increased B LE strength by 1 muscle grade, in order to improve endurance and increase ability to ambulate for increased time.  2. Patient to have decreased pain to 0/10 at worst, to improve QOL.  3. Patient to improve score on the FOTO to 41% or less, to improve QOL.  4. Patient will ambulate up to 1/2 mile without pain to show improvements with QUALITY OF LIFE.    PLAN   Plan of care Certification: 5/27/2022 to July 8, 2022.    Outpatient Physical Therapy 2 times weekly for 6 weeks to include the following interventions: Electrical Stimulation PRN , Moist Heat/ Ice, Patient Education and Therapeutic Exercise.     Aimee Hilario, PT      I CERTIFY THE NEED FOR THESE SERVICES FURNISHED UNDER THIS PLAN OF TREATMENT AND WHILE UNDER MY CARE   Physician's comments:     Physician's Signature: ___________________________________________________

## 2022-06-03 ENCOUNTER — CLINICAL SUPPORT (OUTPATIENT)
Dept: REHABILITATION | Facility: HOSPITAL | Age: 67
End: 2022-06-03
Payer: MEDICAID

## 2022-06-03 DIAGNOSIS — G89.29 CHRONIC PAIN OF LEFT KNEE: Primary | ICD-10-CM

## 2022-06-03 DIAGNOSIS — M25.562 CHRONIC PAIN OF LEFT KNEE: Primary | ICD-10-CM

## 2022-06-03 DIAGNOSIS — R53.1 DECREASED STRENGTH: ICD-10-CM

## 2022-06-03 PROCEDURE — 97110 THERAPEUTIC EXERCISES: CPT

## 2022-06-03 NOTE — PROGRESS NOTES
[facility]   Physical Therapy Treatment Note     [facility]  Clinic Number: [id number]    Therapy Diagnosis:   Encounter Diagnoses   Name Primary?    Chronic pain of left knee Yes    Decreased strength      Physician: Alonso Cortez MD    Visit Date: 6/3/2022  Evaluation Date: 5/27/2022  Authorization Period Expiration: 6/3/2023   Plan of Care Expiration: July 8, 2022  Progress Note Due: June 26, 2022 or 10th visit   Visit # / Visits authorized: 1/12   FOTO: 1/3     Precautions: Standard and osteoporosis       PTA Visit #: 0/5     Time In: 10:00  Time Out: 10:43  Total Billable Time: 43 minutes    SUBJECTIVE     Pt reports: she got a knee brace but she believes it is too small.  She states feels better while using the brace but wants to get a size medium.  She states her knee was feeling better after last visit. The sitting to standing transition has gotten better and with less pain.  She continues to feel weakness in her knee now with the transition. She had some sharp pain under her knee cap for a couple of days. She states her knee continues to get really tight and especially has difficulty straightening her knee with ambulation during her stance phase.      She was compliant with home exercise program.   Response to previous treatment: decreased pain with sitting to standing   Functional change: decreased pain with sitting to standing but continues to remain difficult due to weakness    Pain: 0/10  Location: left knee      OBJECTIVE     Objective Measures updated at progress report unless specified.     Treatment     Emily received the treatments listed below:      therapeutic exercises to develop strength, endurance, ROM and flexibility for 39 minutes including:  rec biking 10 minutes L1   Long sitting quad sets with towel roll 10 x 10 seconds ea   Short arc quads 2x10   Seated hamstring stretching 20 seconds x 3   Bridges 2x10  Straight leg raise 2x10      manual  therapy techniques: Joint mobilizations were applied to the: femur for 4 minutes, including:  APs on femur grade II      Patient Education and Home Exercises     Home Exercises Provided and Patient Education Provided     Education provided:   - HEP remained the same since initial evaluation     Written Home Exercises Provided: yes. Exercises were reviewed and Emily was able to demonstrate them prior to the end of the session.  Emily demonstrated good  understanding of the education provided. See EMR under Patient Instructions for exercises provided during therapy sessions    ASSESSMENT     Patient presents with decreased hip extension at push off which was improved after performing bridges.  Decreased tightness felt in knee after joint mobilizations performed.     Emily Is progressing well towards her goals.   Pt prognosis is Good.     Pt will continue to benefit from skilled outpatient physical therapy to address the deficits listed in the problem list box on initial evaluation, provide pt/family education and to maximize pt's level of independence in the home and community environment.     Pt's spiritual, cultural and educational needs considered and pt agreeable to plan of care and goals.     Anticipated barriers to physical therapy: none    Goals:   Goals: Reviewed:5/27/2022    Short Term Goals: In 3 weeks:  1.Patient to be educated on HEP.  2. Patient to increase strength B LE by 1/2 grade, in order to improve endurance and increase ability to ambulate for increased time.  3.Patient to have decreased pain to 1/10 at worst, to improve QOL.  4. Patient will ambulate up to 4 houses and the [employer] parking lot with 1/10 pain.   5.Patient to improve score on the FOTO, to improve QOL.        Long Term Goals: In 6 weeks  1. Patient to demonstrate increased B LE strength by 1 muscle grade, in order to improve endurance and increase ability to ambulate for increased time.  2. Patient to have decreased pain to 0/10  at worst, to improve QOL.  3. Patient to improve score on the FOTO to 41% or less, to improve QOL.  4. Patient will ambulate up to 1/2 mile without pain to show improvements with QUALITY OF LIFE.       PLAN     Plan of care Certification: 5/27/2022 to July 8, 2022.     Outpatient Physical Therapy 2 times weekly for 6 weeks to include the following interventions: Electrical Stimulation PRN , Moist Heat/ Ice, Patient Education and Therapeutic Exercise.      Aimee Hilario, PT    Aimee Hilario, PT

## 2022-06-06 RX ORDER — LOSARTAN POTASSIUM 100 MG/1
100 TABLET ORAL DAILY
Qty: 90 TABLET | Refills: 0 | Status: SHIPPED | OUTPATIENT
Start: 2022-06-06 | End: 2022-09-09 | Stop reason: SDUPTHER

## 2022-06-06 NOTE — TELEPHONE ENCOUNTER
No new care gaps identified.  [facility] Embedded Care Gaps. Reference number: [id number]. 6/06/2022   9:56:19 AM DEET

## 2022-06-09 ENCOUNTER — CLINICAL SUPPORT (OUTPATIENT)
Dept: REHABILITATION | Facility: HOSPITAL | Age: 67
End: 2022-06-09
Payer: MEDICAID

## 2022-06-09 DIAGNOSIS — G89.29 CHRONIC PAIN OF LEFT KNEE: Primary | ICD-10-CM

## 2022-06-09 DIAGNOSIS — R53.1 DECREASED STRENGTH: ICD-10-CM

## 2022-06-09 DIAGNOSIS — M25.562 CHRONIC PAIN OF LEFT KNEE: Primary | ICD-10-CM

## 2022-06-09 PROCEDURE — 97110 THERAPEUTIC EXERCISES: CPT

## 2022-06-09 NOTE — PROGRESS NOTES
[facility]   Physical Therapy Treatment Note     Name: Emily Talley [facility]  Clinic Number: [id number]    Therapy Diagnosis:   Encounter Diagnoses   Name Primary?    Chronic pain of left knee Yes    Decreased strength      Physician: Alonso Cortez MD    Visit Date: 6/9/2022  Evaluation Date: 5/27/2022  Authorization Period Expiration: 6/3/2023   Plan of Care Expiration: July 8, 2022  Progress Note Due: June 26, 2022 or 10th visit   Visit # / Visits authorized: 2/12   FOTO: 1/3     Precautions: Standard and osteoporosis       PTA Visit #: 0/5     Time In: 9:16  Time Out: 10:00  Total Billable Time: 44 minutes    SUBJECTIVE     Pt reports: she has increased pain on the lateral portion of her L knee when navigating stairs.  She states sometimes her knee feels like it is going to give out when walking or navigating stairs. She states she has some tightness when performing transitions from sitting to standing or when ambulating.        She was compliant with home exercise program.   Response to previous treatment: decreased pain with sitting to standing   Functional change: decreased pain with sitting to standing but continues to remain difficult due to weakness and tightness    Pain: 0/10 currently, 7/10 when navigating stairs  Location: left knee (laterally)     OBJECTIVE     Objective Measures updated at progress report unless specified.     Treatment     Emily received the treatments listed below:      therapeutic exercises to develop strength, endurance, ROM and flexibility for 40 minutes including:  rec biking 10 minutes L2    Heel digs in supine (hamstring isometrics) 10 x 10 seconds ea  Wall squats 10 x 2 seconds ea    Shuttle squats L4 double leg(2sets of 10 reps)  Shuttle squats L3 single leg (2 sets 10 reps)   Sidestepping 5 feet at a time (down and back 6 times)   Single leg balance 10 x 5 seconds ea     manual therapy techniques: Joint mobilizations were applied to the: femur  for 4 minutes, including:  APs on femur grade II  STM To lateral L hamstring      Patient Education and Home Exercises     Home Exercises Provided and Patient Education Provided     Education provided:   - HEP remained the same since initial evaluation     Written Home Exercises Provided: yes. Exercises were reviewed and Emily was able to demonstrate them prior to the end of the session.  Emily demonstrated good  understanding of the education provided. See EMR under Patient Instructions for exercises provided during therapy sessions    ASSESSMENT     Patient had decreased stabilization of knee on eccentric portion of stair navigation.  Patient exhibited increased muscular fatigue during wall squats and single leg shuttle squats this visit.  No increase in pain noted.     Emily Is progressing well towards her goals.   Pt prognosis is Good.     Pt will continue to benefit from skilled outpatient physical therapy to address the deficits listed in the problem list box on initial evaluation, provide pt/family education and to maximize pt's level of independence in the home and community environment.     Pt's spiritual, cultural and educational needs considered and pt agreeable to plan of care and goals.     Anticipated barriers to physical therapy: none    Goals:   Goals: Reviewed:5/27/2022    Short Term Goals: In 3 weeks:  1.Patient to be educated on HEP.  2. Patient to increase strength B LE by 1/2 grade, in order to improve endurance and increase ability to ambulate for increased time.  3.Patient to have decreased pain to 1/10 at worst, to improve QOL.  4. Patient will ambulate up to 4 houses and the [employer] parking lot with 1/10 pain.   5.Patient to improve score on the FOTO, to improve QOL.        Long Term Goals: In 6 weeks  1. Patient to demonstrate increased B LE strength by 1 muscle grade, in order to improve endurance and increase ability to ambulate for increased time.  2. Patient to have decreased pain  to 0/10 at worst, to improve QOL.  3. Patient to improve score on the FOTO to 41% or less, to improve QOL.  4. Patient will ambulate up to 1/2 mile without pain to show improvements with QUALITY OF LIFE.       PLAN     Plan of care Certification: 5/27/2022 to July 8, 2022.     Outpatient Physical Therapy 2 times weekly for 6 weeks to include the following interventions: Electrical Stimulation PRN , Moist Heat/ Ice, Patient Education and Therapeutic Exercise.      Aimee Hilario, PT    Aimee Hilario, PT

## 2022-06-11 ENCOUNTER — PATIENT MESSAGE (OUTPATIENT)
Dept: FAMILY MEDICINE | Facility: CLINIC | Age: 67
End: 2022-06-11
Payer: MEDICAID

## 2022-06-13 ENCOUNTER — DOCUMENTATION ONLY (OUTPATIENT)
Dept: REHABILITATION | Facility: HOSPITAL | Age: 67
End: 2022-06-13
Payer: MEDICAID

## 2022-06-13 NOTE — PROGRESS NOTES
Called Emily this morning to confirm appointment on this Thursday due to patient mentioning last week she will be in [place].

## 2022-06-14 RX ORDER — POTASSIUM CHLORIDE 750 MG/1
CAPSULE, EXTENDED RELEASE ORAL
Qty: 180 CAPSULE | Refills: 2 | Status: SHIPPED | OUTPATIENT
Start: 2022-06-14 | End: 2023-02-09

## 2022-06-14 NOTE — TELEPHONE ENCOUNTER
No new care gaps identified.  [facility] Embedded Care Gaps. Reference number: [id number]. 6/14/2022   11:19:22 AM DEET

## 2022-06-14 NOTE — TELEPHONE ENCOUNTER
Refill Decision Note   Emily Orlin  is requesting a refill authorization.  Brief Assessment and Rationale for Refill:  Approve     Medication Therapy Plan:       Medication Reconciliation Completed: No   Comments:     No Care Gaps recommended.     Note composed:4:46 PM 06/14/2022

## 2022-06-16 ENCOUNTER — TELEPHONE (OUTPATIENT)
Dept: FAMILY MEDICINE | Facility: CLINIC | Age: 67
End: 2022-06-16
Payer: MEDICAID

## 2022-06-16 NOTE — TELEPHONE ENCOUNTER
Attempted to call patient, there was no answer, message left to call the clinic back at their convenience.    My chart message sent as well

## 2022-06-17 ENCOUNTER — TELEPHONE (OUTPATIENT)
Dept: FAMILY MEDICINE | Facility: CLINIC | Age: 67
End: 2022-06-17
Payer: MEDICAID

## 2022-06-17 NOTE — TELEPHONE ENCOUNTER
----- Message from Tung Enriquez sent at 6/17/2022  4:40 PM CDT -----  Contact: self:[phone]  Type:  Patient Returning Call    Who Called:Emily  Who Left Message for Patient:Nilay   Does the patient know what this is regarding?:medication   Would the patient rather a call back or a response via [facility]? Call back   Best Call Back Number:[phone]  Additional Information:

## 2022-06-19 VITALS
OXYGEN SATURATION: 98 % | TEMPERATURE: 98 F | HEART RATE: 76 BPM | BODY MASS INDEX: 22.85 KG/M2 | WEIGHT: 133.81 LBS | HEIGHT: 64 IN

## 2022-06-21 ENCOUNTER — CLINICAL SUPPORT (OUTPATIENT)
Dept: REHABILITATION | Facility: HOSPITAL | Age: 67
End: 2022-06-21
Payer: MEDICAID

## 2022-06-21 DIAGNOSIS — G89.29 CHRONIC PAIN OF LEFT KNEE: Primary | ICD-10-CM

## 2022-06-21 DIAGNOSIS — R53.1 DECREASED STRENGTH: ICD-10-CM

## 2022-06-21 DIAGNOSIS — M25.562 CHRONIC PAIN OF LEFT KNEE: Primary | ICD-10-CM

## 2022-06-21 PROCEDURE — 97110 THERAPEUTIC EXERCISES: CPT

## 2022-06-21 NOTE — PROGRESS NOTES
[facility]   Physical Therapy Treatment Note     [facility]  Clinic Number: [id number]    Therapy Diagnosis:   Encounter Diagnoses   Name Primary?    Chronic pain of left knee Yes    Decreased strength      Physician: Alonso Cortez MD    Visit Date: 6/21/2022  Evaluation Date: 5/27/2022  Authorization Period Expiration: 6/3/2023   Plan of Care Expiration: July 8, 2022  Progress Note Due: June 26, 2022 or 10th visit   Visit # / Visits authorized: 3/12   FOTO: 1/3     Precautions: Standard and osteoporosis       PTA Visit #: 0/5     Time In: 10:01  Time Out: 10:43   Total Billable Time: 42 minutes    SUBJECTIVE     Pt reports: she has increased pain on the lateral portion of her L knee when navigating stairs when going down stairs without railing at home.   Patient states on 6/11/22 she had increased pain in her sciatic nerve. She had to get a cortisone shot in her R SI joint, which helped before she traveled to [place]. States this is why she had to miss PT the Monday after her sciatic nerve became inflamed.     She was compliant with home exercise program.   Response to previous treatment: decreased pain with sitting to standing   Functional change: decreased pain with sitting to standing but continues to remain difficult due to weakness and tightness    Pain: 4/10 currently   Location: left knee (laterally)     OBJECTIVE     Objective Measures updated at progress report unless specified.     Treatment     Emily received the treatments listed below:      therapeutic exercises to develop strength, endurance, ROM and flexibility for 43 minutes including:  rec biking 10 minutes L  Short arc quads 2x10 Bridges [id number] leg raise 2x10    Sidelying hip abduction 2x10   Clams 2x10   Sidestepping 6 laps Wall squats 2x 10 seconds ea    Shuttle squats L4 double leg(2sets of 10 reps)  Shuttle squats L3 single leg (2 sets 10 reps)     Forward and lateral step downs on aerobic  step while holding onto bar (forward - 15, lateral 2x10)     manual therapy techniques: Joint mobilizations were applied to the: femur for 0 minutes, including:      Patient Education and Home Exercises     Home Exercises Provided and Patient Education Provided     Education provided:   - HEP remained the same since initial evaluation     Written Home Exercises Provided: yes. Exercises were reviewed and Emily was able to demonstrate them prior to the end of the session.  Emily demonstrated good  understanding of the education provided. See EMR under Patient Instructions for exercises provided during therapy sessions    ASSESSMENT     Patient had increased weakness noted in hip and knee musculature on L leg during forward and lateral step downs which was seen from shakiness in L leg while performing this exercise.     Emily Is progressing well towards her goals.   Pt prognosis is Good.     Pt will continue to benefit from skilled outpatient physical therapy to address the deficits listed in the problem list box on initial evaluation, provide pt/family education and to maximize pt's level of independence in the home and community environment.     Pt's spiritual, cultural and educational needs considered and pt agreeable to plan of care and goals.     Anticipated barriers to physical therapy: none    Goals:   Goals: Reviewed:5/27/2022    Short Term Goals: In 3 weeks:  1.Patient to be educated on HEP.  2. Patient to increase strength B LE by 1/2 grade, in order to improve endurance and increase ability to ambulate for increased time.  3.Patient to have decreased pain to 1/10 at worst, to improve QOL.  4. Patient will ambulate up to 4 houses and the [employer] parking lot with 1/10 pain.   5.Patient to improve score on the FOTO, to improve QOL.        Long Term Goals: In 6 weeks  1. Patient to demonstrate increased B LE strength by 1 muscle grade, in order to improve endurance and increase ability to ambulate for  increased time.  2. Patient to have decreased pain to 0/10 at worst, to improve QOL.  3. Patient to improve score on the FOTO to 41% or less, to improve QOL.  4. Patient will ambulate up to 1/2 mile without pain to show improvements with QUALITY OF LIFE.       PLAN     Plan of care Certification: 5/27/2022 to July 8, 2022.     Outpatient Physical Therapy 2 times weekly for 6 weeks to include the following interventions: Electrical Stimulation PRN , Moist Heat/ Ice, Patient Education and Therapeutic Exercise.      Aimee Hilario, PT    Aimee Hilario, PT

## 2022-06-22 ENCOUNTER — PATIENT OUTREACH (OUTPATIENT)
Dept: ADMINISTRATIVE | Facility: HOSPITAL | Age: 67
End: 2022-06-22
Payer: MEDICAID

## 2022-06-24 ENCOUNTER — HOSPITAL ENCOUNTER (OUTPATIENT)
Dept: RADIOLOGY | Facility: HOSPITAL | Age: 67
Discharge: HOME | End: 2022-06-24
Attending: FAMILY MEDICINE
Payer: MEDICAID

## 2022-06-24 VITALS — HEIGHT: 64 IN | WEIGHT: 133.81 LBS | BODY MASS INDEX: 22.85 KG/M2

## 2022-06-24 DIAGNOSIS — Z12.31 BREAST CANCER SCREENING BY MAMMOGRAM: ICD-10-CM

## 2022-06-24 PROCEDURE — 77063 BREAST TOMOSYNTHESIS BI: CPT | Mod: 26,,, | Performed by: RADIOLOGY

## 2022-06-24 PROCEDURE — 77063 BREAST TOMOSYNTHESIS BI: CPT | Mod: TC

## 2022-06-24 PROCEDURE — 77063 MAMMO DIGITAL SCREENING BILAT WITH TOMO: ICD-10-PCS | Mod: 26,,, | Performed by: RADIOLOGY

## 2022-06-24 PROCEDURE — 77067 SCR MAMMO BI INCL CAD: CPT | Mod: TC

## 2022-06-24 PROCEDURE — 77067 MAMMO DIGITAL SCREENING BILAT WITH TOMO: ICD-10-PCS | Mod: 26,,, | Performed by: RADIOLOGY

## 2022-06-24 PROCEDURE — 77067 SCR MAMMO BI INCL CAD: CPT | Mod: 26,,, | Performed by: RADIOLOGY

## 2022-06-28 ENCOUNTER — TELEPHONE (OUTPATIENT)
Dept: FAMILY MEDICINE | Facility: CLINIC | Age: 67
End: 2022-06-28
Payer: MEDICAID

## 2022-06-28 NOTE — TELEPHONE ENCOUNTER
----- Message from Lianna Obando sent at 6/28/2022  8:03 AM CDT -----  Type:  Same Day Appointment Request    Caller is requesting a same day appointment.  Caller declined first available appointment listed below.    Name of Caller:pt  When is the first available appointment?nothing coming up on the schedule  Symptoms:hip pain  Best Call Back Number:[phone]  Additional Information: .    Thank you

## 2022-06-29 ENCOUNTER — OFFICE VISIT (OUTPATIENT)
Dept: FAMILY MEDICINE | Facility: CLINIC | Age: 67
End: 2022-06-29
Payer: MEDICAID

## 2022-06-29 ENCOUNTER — HOSPITAL ENCOUNTER (OUTPATIENT)
Dept: RADIOLOGY | Facility: HOSPITAL | Age: 67
Discharge: HOME | End: 2022-06-29
Attending: FAMILY MEDICINE
Payer: MEDICAID

## 2022-06-29 VITALS
TEMPERATURE: 98 F | BODY MASS INDEX: 22 KG/M2 | HEART RATE: 93 BPM | OXYGEN SATURATION: 98 % | HEIGHT: 64 IN | DIASTOLIC BLOOD PRESSURE: 78 MMHG | SYSTOLIC BLOOD PRESSURE: 116 MMHG | WEIGHT: 128.88 LBS

## 2022-06-29 DIAGNOSIS — M47.818 SI JOINT ARTHRITIS: ICD-10-CM

## 2022-06-29 DIAGNOSIS — M54.50 CHRONIC RIGHT-SIDED LOW BACK PAIN WITHOUT SCIATICA: Primary | ICD-10-CM

## 2022-06-29 DIAGNOSIS — M54.50 CHRONIC RIGHT-SIDED LOW BACK PAIN WITHOUT SCIATICA: ICD-10-CM

## 2022-06-29 DIAGNOSIS — G89.29 CHRONIC RIGHT-SIDED LOW BACK PAIN WITHOUT SCIATICA: Primary | ICD-10-CM

## 2022-06-29 DIAGNOSIS — G89.29 CHRONIC RIGHT-SIDED LOW BACK PAIN WITHOUT SCIATICA: ICD-10-CM

## 2022-06-29 PROCEDURE — 3078F DIAST BP <80 MM HG: CPT | Mod: CPTII,,, | Performed by: FAMILY MEDICINE

## 2022-06-29 PROCEDURE — 4010F ACE/ARB THERAPY RXD/TAKEN: CPT | Mod: CPTII,,, | Performed by: FAMILY MEDICINE

## 2022-06-29 PROCEDURE — 1160F RVW MEDS BY RX/DR IN RCRD: CPT | Mod: CPTII,,, | Performed by: FAMILY MEDICINE

## 2022-06-29 PROCEDURE — 3008F PR BODY MASS INDEX (BMI) DOCUMENTED: ICD-10-PCS | Mod: CPTII,,, | Performed by: FAMILY MEDICINE

## 2022-06-29 PROCEDURE — 72100 X-RAY EXAM L-S SPINE 2/3 VWS: CPT | Mod: 26,,, | Performed by: RADIOLOGY

## 2022-06-29 PROCEDURE — 99213 PR OFFICE/OUTPT VISIT, EST, LEVL III, 20-29 MIN: ICD-10-PCS | Mod: S$PBB,,, | Performed by: FAMILY MEDICINE

## 2022-06-29 PROCEDURE — 3074F PR MOST RECENT SYSTOLIC BLOOD PRESSURE < 130 MM HG: ICD-10-PCS | Mod: CPTII,,, | Performed by: FAMILY MEDICINE

## 2022-06-29 PROCEDURE — 72100 X-RAY EXAM L-S SPINE 2/3 VWS: CPT | Mod: TC,FY,PO

## 2022-06-29 PROCEDURE — 4010F PR ACE/ARB THEARPY RXD/TAKEN: ICD-10-PCS | Mod: CPTII,,, | Performed by: FAMILY MEDICINE

## 2022-06-29 PROCEDURE — 3074F SYST BP LT 130 MM HG: CPT | Mod: CPTII,,, | Performed by: FAMILY MEDICINE

## 2022-06-29 PROCEDURE — 1159F MED LIST DOCD IN RCRD: CPT | Mod: CPTII,,, | Performed by: FAMILY MEDICINE

## 2022-06-29 PROCEDURE — 99215 OFFICE O/P EST HI 40 MIN: CPT | Mod: PBBFAC,PO | Performed by: FAMILY MEDICINE

## 2022-06-29 PROCEDURE — 99999 PR PBB SHADOW E&M-EST. PATIENT-LVL V: CPT | Mod: PBBFAC,,, | Performed by: FAMILY MEDICINE

## 2022-06-29 PROCEDURE — 3078F PR MOST RECENT DIASTOLIC BLOOD PRESSURE < 80 MM HG: ICD-10-PCS | Mod: CPTII,,, | Performed by: FAMILY MEDICINE

## 2022-06-29 PROCEDURE — 3044F HG A1C LEVEL LT 7.0%: CPT | Mod: CPTII,,, | Performed by: FAMILY MEDICINE

## 2022-06-29 PROCEDURE — 99999 PR PBB SHADOW E&M-EST. PATIENT-LVL V: ICD-10-PCS | Mod: PBBFAC,,, | Performed by: FAMILY MEDICINE

## 2022-06-29 PROCEDURE — 1160F PR REVIEW ALL MEDS BY PRESCRIBER/CLIN PHARMACIST DOCUMENTED: ICD-10-PCS | Mod: CPTII,,, | Performed by: FAMILY MEDICINE

## 2022-06-29 PROCEDURE — 72100 XR LUMBAR SPINE AP AND LATERAL: ICD-10-PCS | Mod: 26,,, | Performed by: RADIOLOGY

## 2022-06-29 PROCEDURE — 99213 OFFICE O/P EST LOW 20 MIN: CPT | Mod: S$PBB,,, | Performed by: FAMILY MEDICINE

## 2022-06-29 PROCEDURE — 3008F BODY MASS INDEX DOCD: CPT | Mod: CPTII,,, | Performed by: FAMILY MEDICINE

## 2022-06-29 PROCEDURE — 3044F PR MOST RECENT HEMOGLOBIN A1C LEVEL <7.0%: ICD-10-PCS | Mod: CPTII,,, | Performed by: FAMILY MEDICINE

## 2022-06-29 PROCEDURE — 1159F PR MEDICATION LIST DOCUMENTED IN MEDICAL RECORD: ICD-10-PCS | Mod: CPTII,,, | Performed by: FAMILY MEDICINE

## 2022-06-29 RX ORDER — METHYLPREDNISOLONE 4 MG/1
TABLET ORAL
Qty: 1 EACH | Refills: 0 | Status: SHIPPED | OUTPATIENT
Start: 2022-06-29 | End: 2023-07-17

## 2022-06-29 NOTE — PROGRESS NOTES
Chief Complaint:    Chief Complaint   Patient presents with    Hip Pain     Right hip pain       History of Present Illness:  Patient presents today for R hip pain.    Has been going to physical therapy for her L knee and thinks she overexerted herself. Unsure how long she's been going to physical therapy. The pain radiates across her lower back. She is unable to sit for long periods of time.   Her sciatic pain would radiate down her leg. It has improved.  Wants to know if she can get temporary handicap sticker      ROS:  Review of Systems   Constitutional: Negative for appetite change, chills and fever.   HENT: Negative for congestion, ear pain, postnasal drip, rhinorrhea, sinus pressure and sinus pain.    Eyes: Negative for pain.   Respiratory: Negative for cough, chest tightness and shortness of breath.    Cardiovascular: Negative for chest pain and palpitations.   Gastrointestinal: Negative for abdominal pain, blood in stool, constipation, diarrhea and nausea.   Genitourinary: Negative for difficulty urinating, dysuria, flank pain and hematuria.   Musculoskeletal: Positive for arthralgias and myalgias.   Skin: Negative for pallor and wound.   Neurological: Negative for dizziness, tremors, speech difficulty, light-headedness and headaches.   Psychiatric/Behavioral: Negative for behavioral problems, dysphoric mood and sleep disturbance. The patient is not nervous/anxious.    All other systems reviewed and are negative.      Past Medical History:   Diagnosis Date    Arthritis     Hyperlipidemia     Hypertension     Osteoporosis        Social History:  Social History     Socioeconomic History    Marital status:    Tobacco Use    Smoking status: Current Some Day Smoker     Packs/day: 0.50     Years: 45.00     Pack years: 22.50     Types: Cigarettes    Smokeless tobacco: Never Used   Substance and Sexual Activity    Alcohol use: No     Alcohol/week: 0.0 standard drinks    Drug use: No    Sexual  "activity: Never     Partners: Male     Birth control/protection: None       Family History:   family history includes Breast cancer in her paternal aunt.    Health Maintenance   Topic Date Due    Lipid Panel  08/29/2020    LDCT Lung Screen  12/06/2022    Mammogram  06/24/2023    TETANUS VACCINE  02/26/2028    Hepatitis C Screening  Completed       Physical Exam:    Vital Signs  Temp: 97.7 °F (36.5 °C)  Temp src: Temporal  Pulse: 93  SpO2: 98 %  BP: 116/78  BP Location: Left arm  Patient Position: Sitting  Pain Score:   4  Pain Loc: Hip (right hip)  Height and Weight  Height: 5' 4" (162.6 cm)  Weight: 58.4 kg (128 lb 13.7 oz)  BSA (Calculated - sq m): 1.62 sq meters  BMI (Calculated): 22.1  Weight in (lb) to have BMI = 25: 145.3]    Body mass index is 22.12 kg/m².    Physical Exam  Vitals and nursing note reviewed.   Constitutional:       General: She is in acute distress.      Appearance: Normal appearance. She is not toxic-appearing.   HENT:      Head: Normocephalic and atraumatic.      Right Ear: Tympanic membrane normal.      Left Ear: Tympanic membrane normal.   Eyes:      Extraocular Movements: Extraocular movements intact.      Pupils: Pupils are equal, round, and reactive to light.   Cardiovascular:      Rate and Rhythm: Normal rate and regular rhythm.      Heart sounds: Normal heart sounds.   Pulmonary:      Effort: Pulmonary effort is normal.      Breath sounds: Normal breath sounds. No wheezing, rhonchi or rales.   Abdominal:      General: Bowel sounds are normal. There is no distension.      Palpations: Abdomen is soft.      Tenderness: There is no abdominal tenderness.   Musculoskeletal:         General: Normal range of motion.      Cervical back: Normal range of motion.      Lumbar back: Tenderness present. Negative right straight leg raise test and negative left straight leg raise test.      Right hip: Tenderness present. Normal range of motion.        Legs:    Skin:     General: Skin is warm and " dry.      Capillary Refill: Capillary refill takes less than 2 seconds.   Neurological:      General: No focal deficit present.      Mental Status: She is alert and oriented to person, place, and time.   Psychiatric:         Mood and Affect: Mood normal.         Behavior: Behavior normal.         Judgment: Judgment normal.           Assessment:      ICD-10-CM ICD-9-CM   1. Chronic right-sided low back pain without sciatica  M54.50 724.2    G89.29 338.29   2. SI joint arthritis  M47.818 721.3     Plan:     Refer to pain clinic for chronic bilateral low back pain with sciatica. Recommend medrol dosepack 4 mg. Order X-Ray Lumbar Spine AP And Lateral.   Refer to physical therapy for SI joint arthritis.   Orders Placed This Encounter   Procedures    X-Ray Lumbar Spine AP And Lateral    Ambulatory referral/consult to Pain Clinic    Ambulatory referral/consult to Physical/Occupational Therapy       Current Outpatient Medications   Medication Sig Dispense Refill    cholecalciferol, vitamin D3, (VITAMIN D3 ORAL) Take by mouth.      citalopram (CELEXA) 20 MG tablet TAKE 1 TABLET BY MOUTH EVERY DAY 30 tablet 11    diclofenac sodium (VOLTAREN) 1 % Gel Apply 2 g topically once daily. 1 Tube 2    evolocumab (REPATHA SURECLICK) 140 mg/mL PnIj Inject 1 mL (140 mg total) into the skin every 14 (fourteen) days. 2 mL 6    losartan (COZAAR) 100 MG tablet Take 1 tablet (100 mg total) by mouth once daily. 90 tablet 0    metoprolol succinate (TOPROL-XL) 25 MG 24 hr tablet TAKE 1/2 TABLET BY MOUTH EVERY  tablet 0    potassium 99 mg Tab potassium Take No date recorded No form recorded No frequency recorded No route recorded No set duration recorded No set duration amount recorded active No dosage strength recorded No dosage strength units of measure recorded      potassium chloride (MICRO-K) 10 MEQ CpSR TAKE TWO CAPSULES BY MOUTH EVERY  capsule 2    triamcinolone acetonide 0.1% (KENALOG) 0.1 % cream Apply topically  2 (two) times daily. for 10 days 1 each 1    triamterene-hydrochlorothiazide 37.5-25 mg (MAXZIDE-25) 37.5-25 mg per tablet TAKE 1/2 TABLET BY MOUTH EVERY DAY 45 tablet 1    TURMERIC ORAL Take by mouth.      methylPREDNISolone (MEDROL DOSEPACK) 4 mg tablet use as directed 1 each 0     No current facility-administered medications for this visit.       There are no discontinued medications.    No follow-ups on file.      Alonso Cortez MD  Scribe Attestation:   I, Domenica Joseph, am scribing for, and in the presence of, Dr.Arif Cortez I performed the above scribed service and the documentation accurately describes the services I performed. I attest to the accuracy of the note.    I, Dr. Alonso Cortez, reviewed documentation as scribed above. I performed the services described in this documentation.  I agree that the record reflects my personal performance and is accurate and complete. Alonso Cortez MD.  06/29/2022

## 2022-06-30 ENCOUNTER — TELEPHONE (OUTPATIENT)
Dept: PAIN MEDICINE | Facility: CLINIC | Age: 67
End: 2022-06-30
Payer: MEDICAID

## 2022-06-30 ENCOUNTER — CLINICAL SUPPORT (OUTPATIENT)
Dept: REHABILITATION | Facility: HOSPITAL | Age: 67
End: 2022-06-30
Payer: MEDICAID

## 2022-06-30 ENCOUNTER — PATIENT MESSAGE (OUTPATIENT)
Dept: FAMILY MEDICINE | Facility: CLINIC | Age: 67
End: 2022-06-30
Payer: MEDICAID

## 2022-06-30 DIAGNOSIS — R53.1 DECREASED STRENGTH: ICD-10-CM

## 2022-06-30 DIAGNOSIS — G89.29 CHRONIC PAIN OF LEFT KNEE: Primary | ICD-10-CM

## 2022-06-30 DIAGNOSIS — M25.562 CHRONIC PAIN OF LEFT KNEE: Primary | ICD-10-CM

## 2022-06-30 PROCEDURE — 97110 THERAPEUTIC EXERCISES: CPT

## 2022-06-30 NOTE — PLAN OF CARE
"[facility]   Physical Therapy Progress Note/Treatment Note     Name: Emily Ordaz  Clinic Number: [id number]    Therapy Diagnosis:   Encounter Diagnoses   Name Primary?    Chronic pain of left knee Yes    Decreased strength      Physician: Alonso Cortez MD    Visit Date: 6/30/2022  Evaluation Date: 5/27/2022  Authorization Period Expiration: 6/3/2023   Plan of Care Expiration: August 11, 2022   Progress Note Due: 7/30/2022 or 10th visit   Visit # / Visits authorized: 4/12   FOTO: 2/3     Precautions: Standard and osteoporosis       PTA Visit #: 0/5     Time In: 10:11  Time Out: 11:04   Total Billable Time: 53 minutes    SUBJECTIVE     Pt reports: the last 4 days her R posterior hip has been killing her. She could not roll over in bed, sit in a chair, transfer out of a chair. She went to see her primary care for this pain yesterday. She is getting sent to pain management in a couple of weeks. Her MD told her she has a "facet" in her back. She states she took predisone this morning because she received a steroid pack at her MD appointment yesterday.  She slept on her side last night, which seemed to relieve her pain as well.     Patient had made improvements with: walking has gotten better and now she is able to straighten out her knee while walking. Transferring out of chairs has improved. She does not have to use the buggy at the store as much anymore.   Patient continues to have difficulty with: Patient states when her L foot gets caught while walking she feels like her knee is going give out.  She states when she walks up and down the stirs she has knee pain in her knee.      She was compliant with home exercise program.   Response to previous treatment: see above   Functional change: see above     Pain: 3/10 currently, worst: 5/10, best 2/10    Location: left knee (laterally)     Center of lower back pain: 8-10/10 3 days before coming in today, 2/10 today      OBJECTIVE " "    Objective Measures updated at progress report unless specified.        CMS Impairment/Limitation/Restriction for FOTO Knee Survey     Therapist reviewed FOTO scores for Emily Ordaz on 5/27/2022.   FOTO documents entered into [employer] - see Media section.     Limitation Score: 56%  Limitation Score: (6/30/22): 56%     Gait: R lateral shift at trunk, decreased L stance phase  (patient has begun to wear L knee brace again due to it decreasing pain with navigating stairs)     Initial evaluation:   Strength:                     L/E MMT Right Left Pain/Dysfunction with Movement   Hip Flexion 4+/5 4/5     Hip Extension 4-/5 4-/5     Hip abductors 4/5 4-/5     Knee Flexion 4+/5 3-/5     Knee Extension 4+/5 3+/5       6/30/22   Strength:                                   L/E MMT Right Left Pain/Dysfunction with Movement   Hip Flexion Not tested for right leg this visit due to increased pain in lower back  4+/5     Hip Extension " Not tesetd /5     Hip abductors " 4+/5     Knee Flexion " 4+/5 (increased pain in inferior patella tendon)     Knee Extension " 4+/5                     Initial evaluation:  Range of Motion:  Lumbar AROM/PROM % Pain/Dysfunction with Movement   Flexion WFL Increased tightness    Extension  WFL none   R/L SBing   WFL none   R/L rotation  WFL none            6/30/22:   Range of Motion:  Lumbar AROM/PROM % Pain/Dysfunction with Movement   Flexion 40% Increased pain across lower back    Extension  WFL none   R/L SBing   WFL none   R/L rotation  WFL none      Palpation: tenderness to palpation on the L patella       Treatment     Emily received the treatments listed below:      therapeutic exercises to develop strength, endurance, ROM and flexibility for 53 minutes including:  rec biking 10 minutes L  Long sitting quad sets with towel roll 10 x 10 seconds ea Short arc quads 2x10 Straight leg raise 10    Double knee to chest 20     Update of home exercise plan   Assessment for progress note "   manual therapy techniques: Joint mobilizations were applied to the: femur for 0 minutes, including:      Patient Education and Home Exercises     Home Exercises Provided and Patient Education Provided     Education provided:   - HEP remained the same since initial evaluation     Written Home Exercises Provided: yes. Exercises were reviewed and Emily was able to demonstrate them prior to the end of the session.  Emily demonstrated good  understanding of the education provided. See EMR under Patient Instructions for exercises provided during therapy sessions    ASSESSMENT     Double knee to chest hold decreased pain completely in lower back during ambulation and supine to sitting transition. Instructed to perform this exercise 5 times a day.  If back pain remains at next visit, assess for lateral shift of trunk while ambulating.     Emily is a 66 y.o. female referred to outpatient Physical Therapy with a medical diagnosis of pain in left knee. Patient presents with improved strength.  Although she demonstrates improvements with these impairments, she continues to demonstrate decreased strength, endurance, ROM, and increased pain. These impairments are limiting their ability to perform ambulating and navigating stair activities.     Emily Is progressing well towards her goals.   Pt prognosis is Good.     Pt will continue to benefit from skilled outpatient physical therapy to address the deficits listed in the problem list box on initial evaluation, provide pt/family education and to maximize pt's level of independence in the home and community environment.     Pt's spiritual, cultural and educational needs considered and pt agreeable to plan of care and goals.     Anticipated barriers to physical therapy: none    Goals:   Goals: Reviewed:5/27/2022    Short Term Goals: In 3 weeks:  1.Patient to be educated on HEP. IN PROGRESS   2. Patient to increase strength B LE by 1/2 grade, in order to improve endurance  and increase ability to ambulate for increased time. IN PROGRESS   3.Patient to have decreased pain to 1/10 at worst, to improve QOL. IN PROGRESS   4. Patient will ambulate up to 4 houses and the [employer] parking lot with 1/10 pain. IN PROGRESS   5.Patient to improve score on the FOTO, to improve QOL. IN PROGRESS         Long Term Goals: In 6 weeks  1. Patient to demonstrate increased B LE strength by 1 muscle grade, in order to improve endurance and increase ability to ambulate for increased time. IN PROGRESS   2. Patient to have decreased pain to 0/10 at worst, to improve QOL.IN PROGRESS   3. Patient to improve score on the FOTO to 41% or less, to improve QOL. IN PROGRESS   4. Patient will ambulate up to 1/2 mile without pain to show improvements with QUALITY OF LIFE. IN PROGRESS        PLAN     Plan of care Certification: 5/27/2022 to August 11, 2022.     Outpatient Physical Therapy 2 times weekly for 6 weeks to include the following interventions: Electrical Stimulation PRN , Moist Heat/ Ice, Patient Education and Therapeutic Exercise.      Aimee Hilario, PT

## 2022-06-30 NOTE — PROGRESS NOTES
"[facility]   Physical Therapy Progress Note/Treatment Note     Name: Emily Ordaz  Clinic Number: [id number]    Therapy Diagnosis:   Encounter Diagnoses   Name Primary?    Chronic pain of left knee Yes    Decreased strength      Physician: Alonso Cortez MD    Visit Date: 6/30/2022  Evaluation Date: 5/27/2022  Authorization Period Expiration: 6/3/2023   Plan of Care Expiration: August 11, 2022   Progress Note Due: 7/30/2022 or 10th visit   Visit # / Visits authorized: 4/12   FOTO: 2/3     Precautions: Standard and osteoporosis       PTA Visit #: 0/5     Time In: 10:11  Time Out: 11:04   Total Billable Time: 53 minutes    SUBJECTIVE     Pt reports: the last 4 days her R posterior hip has been killing her. She could not roll over in bed, sit in a chair, transfer out of a chair. She went to see her primary care for this pain yesterday. She is getting sent to pain management in a couple of weeks. Her MD told her she has a "facet" in her back. She states she took predisone this morning because she received a steroid pack at her MD appointment yesterday.  She slept on her side last night, which seemed to relieve her pain as well.     Patient had made improvements with: walking has gotten better and now she is able to straighten out her knee while walking. Transferring out of chairs has improved. She does not have to use the buggy at the store as much anymore.   Patient continues to have difficulty with: Patient states when her L foot gets caught while walking she feels like her knee is going give out.  She states when she walks up and down the stirs she has knee pain in her knee.      She was compliant with home exercise program.   Response to previous treatment: see above   Functional change: see above     Pain: 3/10 currently, worst: 5/10, best 2/10    Location: left knee (laterally)     Center of lower back pain: 8-10/10 3 days before coming in today, 2/10 today      OBJECTIVE " "    Objective Measures updated at progress report unless specified.        CMS Impairment/Limitation/Restriction for FOTO Knee Survey     Therapist reviewed FOTO scores for Emily Ordaz on 5/27/2022.   FOTO documents entered into [employer] - see Media section.     Limitation Score: 56%  Limitation Score: (6/30/22): 56%     Gait: R lateral shift at trunk, decreased L stance phase  (patient has begun to wear L knee brace again due to it decreasing pain with navigating stairs)     Initial evaluation:   Strength:                     L/E MMT Right Left Pain/Dysfunction with Movement   Hip Flexion 4+/5 4/5     Hip Extension 4-/5 4-/5     Hip abductors 4/5 4-/5     Knee Flexion 4+/5 3-/5     Knee Extension 4+/5 3+/5       6/30/22   Strength:                                   L/E MMT Right Left Pain/Dysfunction with Movement   Hip Flexion Not tested for right leg this visit due to increased pain in lower back  4+/5     Hip Extension " Not tesetd /5     Hip abductors " 4+/5     Knee Flexion " 4+/5 (increased pain in inferior patella tendon)     Knee Extension " 4+/5                     Initial evaluation:  Range of Motion:  Lumbar AROM/PROM % Pain/Dysfunction with Movement   Flexion WFL Increased tightness    Extension  WFL none   R/L SBing   WFL none   R/L rotation  WFL none            6/30/22:   Range of Motion:  Lumbar AROM/PROM % Pain/Dysfunction with Movement   Flexion 40% Increased pain across lower back    Extension  WFL none   R/L SBing   WFL none   R/L rotation  WFL none      Palpation: tenderness to palpation on the L patella       Treatment     Emily received the treatments listed below:      therapeutic exercises to develop strength, endurance, ROM and flexibility for 53 minutes including:  rec biking 10 minutes L  Long sitting quad sets with towel roll 10 x 10 seconds ea Short arc quads 2x10 Straight leg raise 10    Double knee to chest 20     Update of home exercise plan   Assessment for progress note "   manual therapy techniques: Joint mobilizations were applied to the: femur for 0 minutes, including:      Patient Education and Home Exercises     Home Exercises Provided and Patient Education Provided     Education provided:   - HEP remained the same since initial evaluation     Written Home Exercises Provided: yes. Exercises were reviewed and Emily was able to demonstrate them prior to the end of the session.  Emily demonstrated good  understanding of the education provided. See EMR under Patient Instructions for exercises provided during therapy sessions    ASSESSMENT     Double knee to chest hold decreased pain completely in lower back during ambulation and supine to sitting transition. Instructed to perform this exercise 5 times a day.  If back pain remains at next visit, assess for lateral shift of trunk while ambulating.     Emily is a 66 y.o. female referred to outpatient Physical Therapy with a medical diagnosis of pain in left knee. Patient presents with improved strength.  Although she demonstrates improvements with these impairments, she continues to demonstrate decreased strength, endurance, ROM, and increased pain. These impairments are limiting their ability to perform ambulating and navigating stair activities.     Emily Is progressing well towards her goals.   Pt prognosis is Good.     Pt will continue to benefit from skilled outpatient physical therapy to address the deficits listed in the problem list box on initial evaluation, provide pt/family education and to maximize pt's level of independence in the home and community environment.     Pt's spiritual, cultural and educational needs considered and pt agreeable to plan of care and goals.     Anticipated barriers to physical therapy: none    Goals:   Goals: Reviewed:5/27/2022    Short Term Goals: In 3 weeks:  1.Patient to be educated on HEP. IN PROGRESS   2. Patient to increase strength B LE by 1/2 grade, in order to improve endurance  and increase ability to ambulate for increased time. IN PROGRESS   3.Patient to have decreased pain to 1/10 at worst, to improve QOL. IN PROGRESS   4. Patient will ambulate up to 4 houses and the [employer] parking lot with 1/10 pain. IN PROGRESS   5.Patient to improve score on the FOTO, to improve QOL. IN PROGRESS         Long Term Goals: In 6 weeks  1. Patient to demonstrate increased B LE strength by 1 muscle grade, in order to improve endurance and increase ability to ambulate for increased time. IN PROGRESS   2. Patient to have decreased pain to 0/10 at worst, to improve QOL.IN PROGRESS   3. Patient to improve score on the FOTO to 41% or less, to improve QOL. IN PROGRESS   4. Patient will ambulate up to 1/2 mile without pain to show improvements with QUALITY OF LIFE. IN PROGRESS        PLAN     Plan of care Certification: 5/27/2022 to August 11, 2022.     Outpatient Physical Therapy 2 times weekly for 6 weeks to include the following interventions: Electrical Stimulation PRN , Moist Heat/ Ice, Patient Education and Therapeutic Exercise.      Aimee Hilario, PT    Aimee Hilario, PT

## 2022-07-01 ENCOUNTER — TELEPHONE (OUTPATIENT)
Dept: PAIN MEDICINE | Facility: CLINIC | Age: 67
End: 2022-07-01
Payer: MEDICARE

## 2022-07-12 ENCOUNTER — CLINICAL SUPPORT (OUTPATIENT)
Dept: REHABILITATION | Facility: HOSPITAL | Age: 67
End: 2022-07-12
Payer: MEDICARE

## 2022-07-12 DIAGNOSIS — M25.562 CHRONIC PAIN OF LEFT KNEE: Primary | ICD-10-CM

## 2022-07-12 DIAGNOSIS — G89.29 CHRONIC RIGHT-SIDED LOW BACK PAIN WITHOUT SCIATICA: ICD-10-CM

## 2022-07-12 DIAGNOSIS — M47.818 SI JOINT ARTHRITIS: ICD-10-CM

## 2022-07-12 DIAGNOSIS — M54.50 CHRONIC RIGHT-SIDED LOW BACK PAIN WITHOUT SCIATICA: ICD-10-CM

## 2022-07-12 DIAGNOSIS — R53.1 DECREASED STRENGTH: ICD-10-CM

## 2022-07-12 DIAGNOSIS — G89.29 CHRONIC PAIN OF LEFT KNEE: Primary | ICD-10-CM

## 2022-07-12 PROCEDURE — 97110 THERAPEUTIC EXERCISES: CPT

## 2022-07-12 NOTE — PROGRESS NOTES
[facility]   Physical Therapy Progress Note/Treatment Note     Name: Emily Ordaz  Clinic Number: [id number]    Therapy Diagnosis:   Encounter Diagnoses   Name Primary?    Chronic right-sided low back pain without sciatica     SI joint arthritis     Chronic pain of left knee Yes    Decreased strength      Physician: Alonso Cortez MD    Visit Date: 7/12/2022  Evaluation Date: 5/27/2022  Authorization Period Expiration: 6/3/2023   Plan of Care Expiration: August 11, 2022   Progress Note Due: 7/30/2022 or 10th visit   Visit # / Visits authorized: 5/12   FOTO: 2/3     Precautions: Standard and osteoporosis       PTA Visit #: 0/5     Time In: 13:52  Time Out:  14:30  Total Billable Time: 38 minutes    SUBJECTIVE     Pt reports: patient states she does not have as much pain behind her L knee anymore.  She states she is no longer turning side ways when navigating the stairs but continues to have a step to gait pattern.  When her heel goes In a hole in the concrete, she has a pain in her knee but this pain quickly goes away. She has been wearing her brace as much as she can. She does not feel like her knee is going to give out as much as it used to feel like it was. Getting out of a chair is better and she still sometimes has to hold onto the arms of a chair but this activity is getting better.     She states her R posterior hip was bothering her this morning. She states at home she has had to kick her leg backwards to relieve her pain.  She has been trying to sleep in a fetal position, which she believes is helping. She has also been doing the knee to chest exercise which has been helping.     She was compliant with home exercise program.   Response to previous treatment: see above   Functional change: see above     Pain: 2/10 currently, worst: 3/10, best 2/10    Location: left knee (laterally)     R posterior hip: 3/10 at beginning of session and 0/10 after riding the bike       OBJECTIVE     Objective Measures updated at progress report unless specified.        Treatment     Emily received the treatments listed below:      therapeutic exercises to develop strength, endurance, ROM and flexibility for 38 minutes including:  rec biking 10 minutes L  Long sitting quad sets with towel roll 10 x 10 seconds ea Short arc quads 2x10 (2#) Straight leg raise 2x10 bilaterally    Double knee to chest 20   Clams 2x10 Sidestepping 6 laps Shuttle squats L4 double leg (2sets of 10 reps)Shuttle squats L3 single leg (2 sets 10 reps)         Patient Education and Home Exercises     Home Exercises Provided and Patient Education Provided     Education provided:   - HEP remained the same since initial evaluation     Written Home Exercises Provided: yes. Exercises were reviewed and Emily was able to demonstrate them prior to the end of the session.  Emily demonstrated good  understanding of the education provided. See EMR under Patient Instructions for exercises provided during therapy sessions    ASSESSMENT     Patient had no increase in pain in knee or hip with exercises this date.  Patient was instructed to continue to perform double knee to chest exercise at home to decrease R hip pain.     Eimly Is progressing well towards her goals.   Pt prognosis is Good.     Pt will continue to benefit from skilled outpatient physical therapy to address the deficits listed in the problem list box on initial evaluation, provide pt/family education and to maximize pt's level of independence in the home and community environment.     Pt's spiritual, cultural and educational needs considered and pt agreeable to plan of care and goals.     Anticipated barriers to physical therapy: none    Goals:   Goals: Reviewed:5/27/2022    Short Term Goals: In 3 weeks:  1.Patient to be educated on HEP. IN PROGRESS   2. Patient to increase strength B LE by 1/2 grade, in order to improve endurance and increase ability to ambulate  for increased time. IN PROGRESS   3.Patient to have decreased pain to 1/10 at worst, to improve QOL. IN PROGRESS   4. Patient will ambulate up to 4 houses and the [employer] parking lot with 1/10 pain. IN PROGRESS   5.Patient to improve score on the FOTO, to improve QOL. IN PROGRESS         Long Term Goals: In 6 weeks  1. Patient to demonstrate increased B LE strength by 1 muscle grade, in order to improve endurance and increase ability to ambulate for increased time. IN PROGRESS   2. Patient to have decreased pain to 0/10 at worst, to improve QOL.IN PROGRESS   3. Patient to improve score on the FOTO to 41% or less, to improve QOL. IN PROGRESS   4. Patient will ambulate up to 1/2 mile without pain to show improvements with QUALITY OF LIFE. IN PROGRESS        PLAN     Plan of care Certification: 5/27/2022 to August 11, 2022.     Outpatient Physical Therapy 2 times weekly for 6 weeks to include the following interventions: Electrical Stimulation PRN , Moist Heat/ Ice, Patient Education and Therapeutic Exercise.      Aimee Hilario, PT    Aimee Hilario, PT

## 2022-07-14 ENCOUNTER — CLINICAL SUPPORT (OUTPATIENT)
Dept: REHABILITATION | Facility: HOSPITAL | Age: 67
End: 2022-07-14
Payer: MEDICARE

## 2022-07-14 DIAGNOSIS — R53.1 DECREASED STRENGTH: ICD-10-CM

## 2022-07-14 DIAGNOSIS — M25.562 CHRONIC PAIN OF LEFT KNEE: Primary | ICD-10-CM

## 2022-07-14 DIAGNOSIS — G89.29 CHRONIC PAIN OF LEFT KNEE: Primary | ICD-10-CM

## 2022-07-14 PROCEDURE — 97110 THERAPEUTIC EXERCISES: CPT

## 2022-07-14 NOTE — PROGRESS NOTES
[facility]   Physical Therapy Treatment Note     [facility]  [facility] Number: [id number]    Therapy Diagnosis:   Encounter Diagnoses   Name Primary?    Chronic pain of left knee Yes    Decreased strength      Physician: Alonso Cortez MD    Visit Date: 7/14/2022  Evaluation Date: 5/27/2022  Authorization Period Expiration: 6/3/2023   Plan of Care Expiration: August 11, 2022   Progress Note Due: 7/30/2022 or 10th visit   Visit # / Visits authorized: 6/12   FOTO: 2/3     Precautions: Standard and osteoporosis       PTA Visit #: 0/5     Time In: 13:47  Time Out:  14:25  Total Billable Time: 38 minutes    SUBJECTIVE     Pt reports: patient states she is not sure what activities bother her L knee and R hip  But she states she had increased soreness after last visit.        She was compliant with home exercise program.   Response to previous treatment: see above   Functional change: see above     Pain: 1/10 currently  Location: left knee (laterally) and R hip     OBJECTIVE     Objective Measures updated at progress report unless specified.      Treatment     Emily received the treatments listed below:      therapeutic exercises to develop strength, endurance, ROM and flexibility for 38 minutes including:  rec biking 10 minutes L  Long sitting quad sets with towel roll 20 x 10 seconds ea Short arc quads 2x10 (0#) Straight leg raise 2x10 bilaterally    Double knee to chest 20   Clams 2x10 Sidestepping 6 laps Shuttle squats L4 double leg (2sets of 10 reps)Shuttle squats L3 single leg (2 sets 10 reps)   Seated hamstring stretching 20 seconds x 2   Figure 4 stretching seated 20 seconds x 3         Patient Education and Home Exercises     Home Exercises Provided and Patient Education Provided     Education provided:   - HEP remained the same since initial evaluation     Written Home Exercises Provided: yes. Exercises were reviewed and Emily was able to demonstrate them prior to the  end of the session.  Emily demonstrated good  understanding of the education provided. See EMR under Patient Instructions for exercises provided during therapy sessions    ASSESSMENT     Did not increase exercises this visit due to patient having increased soreness after last visit. Instructed patient through seated figure 4 stretching and patient's gait was improved significantly with decreased limping noted after this exercise was performed.    Emily Is progressing well towards her goals.   Pt prognosis is Good.     Pt will continue to benefit from skilled outpatient physical therapy to address the deficits listed in the problem list box on initial evaluation, provide pt/family education and to maximize pt's level of independence in the home and community environment.     Pt's spiritual, cultural and educational needs considered and pt agreeable to plan of care and goals.     Anticipated barriers to physical therapy: none    Goals:   Goals: Reviewed:5/27/2022    Short Term Goals: In 3 weeks:  1.Patient to be educated on HEP. IN PROGRESS   2. Patient to increase strength B LE by 1/2 grade, in order to improve endurance and increase ability to ambulate for increased time. IN PROGRESS   3.Patient to have decreased pain to 1/10 at worst, to improve QOL. IN PROGRESS   4. Patient will ambulate up to 4 houses and the [employer] parking lot with 1/10 pain. IN PROGRESS   5.Patient to improve score on the FOTO, to improve QOL. IN PROGRESS         Long Term Goals: In 6 weeks  1. Patient to demonstrate increased B LE strength by 1 muscle grade, in order to improve endurance and increase ability to ambulate for increased time. IN PROGRESS   2. Patient to have decreased pain to 0/10 at worst, to improve QOL.IN PROGRESS   3. Patient to improve score on the FOTO to 41% or less, to improve QOL. IN PROGRESS   4. Patient will ambulate up to 1/2 mile without pain to show improvements with QUALITY OF LIFE. IN PROGRESS        PLAN      Plan of care Certification: 5/27/2022 to August 11, 2022.     Outpatient Physical Therapy 2 times weekly for 6 weeks to include the following interventions: Electrical Stimulation PRN , Moist Heat/ Ice, Patient Education and Therapeutic Exercise.      Aimee Hilario, PT    Aimee Hilario, PT

## 2022-07-18 ENCOUNTER — DOCUMENTATION ONLY (OUTPATIENT)
Dept: REHABILITATION | Facility: HOSPITAL | Age: 67
End: 2022-07-18
Payer: MEDICARE

## 2022-07-19 RX ORDER — TRIAMTERENE/HYDROCHLOROTHIAZID 37.5-25 MG
TABLET ORAL
Qty: 45 TABLET | Refills: 1 | Status: SHIPPED | OUTPATIENT
Start: 2022-07-19 | End: 2022-11-17

## 2022-07-19 NOTE — TELEPHONE ENCOUNTER
No new care gaps identified.  [facility] Embedded Care Gaps. Reference number: [id number]. 7/19/2022   8:27:01 AM DEET

## 2022-07-19 NOTE — TELEPHONE ENCOUNTER
Refill Decision Note   Emily Orlin  is requesting a refill authorization.  Brief Assessment and Rationale for Refill:  Approve     Medication Therapy Plan:       Medication Reconciliation Completed: No   Comments:     No Care Gaps recommended.     Note composed:1:44 PM 07/19/2022

## 2022-07-25 ENCOUNTER — DOCUMENTATION ONLY (OUTPATIENT)
Dept: REHABILITATION | Facility: HOSPITAL | Age: 67
End: 2022-07-25
Payer: MEDICARE

## 2022-08-24 DIAGNOSIS — Z78.0 MENOPAUSE: ICD-10-CM

## 2022-09-09 RX ORDER — LOSARTAN POTASSIUM 100 MG/1
100 TABLET ORAL DAILY
Qty: 90 TABLET | Refills: 1 | Status: SHIPPED | OUTPATIENT
Start: 2022-09-09 | End: 2022-12-06

## 2022-09-09 NOTE — TELEPHONE ENCOUNTER
No new care gaps identified.  [facility] Embedded Care Gaps. Reference number: [id number]. 9/09/2022   9:21:42 AM DEET

## 2022-09-22 ENCOUNTER — APPOINTMENT (OUTPATIENT)
Dept: RADIOLOGY | Facility: HOSPITAL | Age: 67
End: 2022-09-22
Attending: FAMILY MEDICINE
Payer: MEDICARE

## 2022-09-22 DIAGNOSIS — Z78.0 MENOPAUSE: ICD-10-CM

## 2022-09-22 PROCEDURE — 77080 DXA BONE DENSITY AXIAL: CPT | Mod: 26,,, | Performed by: RADIOLOGY

## 2022-09-22 PROCEDURE — 77080 DEXA BONE DENSITY SPINE HIP: ICD-10-PCS | Mod: 26,,, | Performed by: RADIOLOGY

## 2022-09-22 PROCEDURE — 77080 DXA BONE DENSITY AXIAL: CPT | Mod: TC

## 2022-10-06 ENCOUNTER — OFFICE VISIT (OUTPATIENT)
Dept: ORTHOPEDICS | Facility: CLINIC | Age: 67
End: 2022-10-06
Payer: MEDICARE

## 2022-10-06 VITALS — BODY MASS INDEX: 21.85 KG/M2 | WEIGHT: 128 LBS | HEIGHT: 64 IN

## 2022-10-06 DIAGNOSIS — M54.32 SCIATIC PAIN, LEFT: ICD-10-CM

## 2022-10-06 DIAGNOSIS — M17.0 PRIMARY OSTEOARTHRITIS OF BOTH KNEES: Primary | ICD-10-CM

## 2022-10-06 PROCEDURE — 4010F ACE/ARB THERAPY RXD/TAKEN: CPT | Mod: CPTII,S$GLB,, | Performed by: STUDENT IN AN ORGANIZED HEALTH CARE EDUCATION/TRAINING PROGRAM

## 2022-10-06 PROCEDURE — 3288F PR FALLS RISK ASSESSMENT DOCUMENTED: ICD-10-PCS | Mod: CPTII,S$GLB,, | Performed by: STUDENT IN AN ORGANIZED HEALTH CARE EDUCATION/TRAINING PROGRAM

## 2022-10-06 PROCEDURE — 1159F MED LIST DOCD IN RCRD: CPT | Mod: CPTII,S$GLB,, | Performed by: STUDENT IN AN ORGANIZED HEALTH CARE EDUCATION/TRAINING PROGRAM

## 2022-10-06 PROCEDURE — 99999 PR PBB SHADOW E&M-EST. PATIENT-LVL IV: ICD-10-PCS | Mod: PBBFAC,,, | Performed by: STUDENT IN AN ORGANIZED HEALTH CARE EDUCATION/TRAINING PROGRAM

## 2022-10-06 PROCEDURE — 3008F BODY MASS INDEX DOCD: CPT | Mod: CPTII,S$GLB,, | Performed by: STUDENT IN AN ORGANIZED HEALTH CARE EDUCATION/TRAINING PROGRAM

## 2022-10-06 PROCEDURE — 4010F PR ACE/ARB THEARPY RXD/TAKEN: ICD-10-PCS | Mod: CPTII,S$GLB,, | Performed by: STUDENT IN AN ORGANIZED HEALTH CARE EDUCATION/TRAINING PROGRAM

## 2022-10-06 PROCEDURE — 1101F PT FALLS ASSESS-DOCD LE1/YR: CPT | Mod: CPTII,S$GLB,, | Performed by: STUDENT IN AN ORGANIZED HEALTH CARE EDUCATION/TRAINING PROGRAM

## 2022-10-06 PROCEDURE — 99204 OFFICE O/P NEW MOD 45 MIN: CPT | Mod: S$GLB,,, | Performed by: STUDENT IN AN ORGANIZED HEALTH CARE EDUCATION/TRAINING PROGRAM

## 2022-10-06 PROCEDURE — 3008F PR BODY MASS INDEX (BMI) DOCUMENTED: ICD-10-PCS | Mod: CPTII,S$GLB,, | Performed by: STUDENT IN AN ORGANIZED HEALTH CARE EDUCATION/TRAINING PROGRAM

## 2022-10-06 PROCEDURE — 1159F PR MEDICATION LIST DOCUMENTED IN MEDICAL RECORD: ICD-10-PCS | Mod: CPTII,S$GLB,, | Performed by: STUDENT IN AN ORGANIZED HEALTH CARE EDUCATION/TRAINING PROGRAM

## 2022-10-06 PROCEDURE — 1160F RVW MEDS BY RX/DR IN RCRD: CPT | Mod: CPTII,S$GLB,, | Performed by: STUDENT IN AN ORGANIZED HEALTH CARE EDUCATION/TRAINING PROGRAM

## 2022-10-06 PROCEDURE — 3044F PR MOST RECENT HEMOGLOBIN A1C LEVEL <7.0%: ICD-10-PCS | Mod: CPTII,S$GLB,, | Performed by: STUDENT IN AN ORGANIZED HEALTH CARE EDUCATION/TRAINING PROGRAM

## 2022-10-06 PROCEDURE — 3288F FALL RISK ASSESSMENT DOCD: CPT | Mod: CPTII,S$GLB,, | Performed by: STUDENT IN AN ORGANIZED HEALTH CARE EDUCATION/TRAINING PROGRAM

## 2022-10-06 PROCEDURE — 99999 PR PBB SHADOW E&M-EST. PATIENT-LVL IV: CPT | Mod: PBBFAC,,, | Performed by: STUDENT IN AN ORGANIZED HEALTH CARE EDUCATION/TRAINING PROGRAM

## 2022-10-06 PROCEDURE — 1101F PR PT FALLS ASSESS DOC 0-1 FALLS W/OUT INJ PAST YR: ICD-10-PCS | Mod: CPTII,S$GLB,, | Performed by: STUDENT IN AN ORGANIZED HEALTH CARE EDUCATION/TRAINING PROGRAM

## 2022-10-06 PROCEDURE — 99204 PR OFFICE/OUTPT VISIT, NEW, LEVL IV, 45-59 MIN: ICD-10-PCS | Mod: S$GLB,,, | Performed by: STUDENT IN AN ORGANIZED HEALTH CARE EDUCATION/TRAINING PROGRAM

## 2022-10-06 PROCEDURE — 1160F PR REVIEW ALL MEDS BY PRESCRIBER/CLIN PHARMACIST DOCUMENTED: ICD-10-PCS | Mod: CPTII,S$GLB,, | Performed by: STUDENT IN AN ORGANIZED HEALTH CARE EDUCATION/TRAINING PROGRAM

## 2022-10-06 PROCEDURE — 3044F HG A1C LEVEL LT 7.0%: CPT | Mod: CPTII,S$GLB,, | Performed by: STUDENT IN AN ORGANIZED HEALTH CARE EDUCATION/TRAINING PROGRAM

## 2022-10-06 RX ORDER — PREDNISONE 20 MG/1
TABLET ORAL
COMMUNITY
Start: 2022-04-04 | End: 2023-07-17

## 2022-10-06 RX ORDER — DICLOFENAC SODIUM 100 MG/1
TABLET, EXTENDED RELEASE ORAL
COMMUNITY
Start: 2022-10-03 | End: 2023-07-17

## 2022-10-06 RX ORDER — METHOCARBAMOL 750 MG/1
TABLET, FILM COATED ORAL
COMMUNITY
Start: 2022-06-13 | End: 2023-07-17

## 2022-10-06 RX ORDER — MELOXICAM 7.5 MG/1
TABLET ORAL
COMMUNITY
Start: 2022-06-13 | End: 2023-07-17

## 2022-10-06 NOTE — PROGRESS NOTES
"        Patient ID: Emily Ordaz  YOB: 1955  MRN: [id number]    Chief Complaint: Pain of the Left Knee      Referred By: Dr. Alonso Cortez for Left Knee Pain    History of Present Illness: Emily Ordaz is a right-hand dominant 67 y.o. female who presents today with left knee pain that has been present since March. She believes a lot of her pain is caused from her sciatic nerve which she just received an injection of Toradol and Decadron for on 10/3/2022.  She states that that injection did provide her knee some relief for an afternoon.  Patient rates her knee pain today at a 4/10 and describes the pain as a constant aching and states that her knee feels sore and "stuck".  She states that the pain is worse in the mornings and when she goes from a seated to a standing position.  She is unable to go down stairs without pain.  She is currently wearing a knee brace that was provided to her by her PCP.  She has tried physical therapy in the past for her knee pain but states that it only increased her pain.     The patient is active in none.  Occupation: Retired      Past Medical History:   Past Medical History:   Diagnosis Date    Arthritis     Hyperlipidemia     Hypertension     Osteoporosis      History reviewed. No pertinent surgical history.  Family History   Problem Relation Age of Onset    Breast cancer Paternal Aunt      Social History     Socioeconomic History    Marital status:    Tobacco Use    Smoking status: Some Days     Packs/day: 0.50     Years: 45.00     Pack years: 22.50     Types: Cigarettes    Smokeless tobacco: Never   Substance and Sexual Activity    Alcohol use: No     Alcohol/week: 0.0 standard drinks    Drug use: No    Sexual activity: Never     Partners: Male     Birth control/protection: None     Medication List with Changes/Refills   Current Medications    CHOLECALCIFEROL, VITAMIN D3, (VITAMIN D3 ORAL)    Take by mouth.    CITALOPRAM (CELEXA) 20 MG TABLET    " TAKE 1 TABLET BY MOUTH EVERY DAY    DICLOFENAC SODIUM (VOLTAREN) 1 % GEL    Apply 2 g topically once daily.    DICLOFENAC SODIUM 100 MG 24 HR TABLET    diclofenac sodium Take 1 tablet (oral) 1 time per day for 14 days 20221003 tablet extended release 24 hr 1 time per day oral 14 days active 100 mg    EVOLOCUMAB (REPATHA SURECLICK) 140 MG/ML PNIJ    Inject 1 mL (140 mg total) into the skin every 14 (fourteen) days.    LOSARTAN (COZAAR) 100 MG TABLET    Take 1 tablet (100 mg total) by mouth once daily.    MELOXICAM (MOBIC) 7.5 MG TABLET    meloxicam Take 1 Tablet (oral) 1 time per day PRN - Pain 20220613 tablet 1 time per day oral No set duration recorded No set duration amount recorded suspended 7.5 mg    METHOCARBAMOL (ROBAXIN) 750 MG TAB    methocarbamol Take 1 tablet (oral) 2 times per day PRN for 10 days 20220613 tablet 2 times per day oral 10 days suspended 750 mg    METHYLPREDNISOLONE (MEDROL DOSEPACK) 4 MG TABLET    use as directed    METOPROLOL SUCCINATE (TOPROL-XL) 25 MG 24 HR TABLET    TAKE 1/2 TABLET BY MOUTH EVERY DAY    POTASSIUM 99 MG TAB    potassium Take No date recorded No form recorded No frequency recorded No route recorded No set duration recorded No set duration amount recorded active No dosage strength recorded No dosage strength units of measure recorded    POTASSIUM CHLORIDE (MICRO-K) 10 MEQ CPSR    TAKE TWO CAPSULES BY MOUTH EVERY DAY    PREDNISONE (DELTASONE) 20 MG TABLET    prednisone Take 2 tablet (oral) 1 time per day for 4 days 20220404 tablet 1 time per day oral 4 days suspended 20 MG    TRIAMCINOLONE ACETONIDE 0.1% (KENALOG) 0.1 % CREAM    Apply topically 2 (two) times daily. for 10 days    TRIAMTERENE-HYDROCHLOROTHIAZIDE 37.5-25 MG (MAXZIDE-25) 37.5-25 MG PER TABLET    TAKE 1/2 TABLET BY MOUTH EVERY DAY    TURMERIC ORAL    Take by mouth.     Review of patient's allergies indicates:   Allergen Reactions    Statins-hmg-coa reductase inhibitors      Leg cramps and pain        Physical  Exam:   Body mass index is 21.97 kg/m².    GENERAL: Well appearing, in no acute distress.  HEAD: Normocephalic and atraumatic.  ENT: External ears and nose grossly normal.  EYES: EOMI bilaterally  PULMONARY: Respirations are grossly even and non-labored.  NEURO: Awake, alert, and oriented x 3.  SKIN: No obvious rashes appreciated.  PSYCH: Mood & affect are appropriate.    Detailed MSK exam:     Left knee exam:   -ROM: extension 0, flexion 120  -TTP: medial joint line  -effusion: none  -Patellar apprehension negative  -Candie test negative  -stable to varus and valgus stress tests  -Lachman test negative, anterior drawer test negative, posterior drawer test negative    Right knee exam:   -ROM: extension 0, flexion 120  -TTP: none  -effusion: none  -Patellar apprehension negative  -Candie test negative  -stable to varus and valgus stress tests  -Lachman test negative, anterior drawer test negative, posterior drawer test negative      Imaging:    Relevant imaging results were reviewed and interpreted by me and per my read shows mild arthritic changes.  This was discussed with the patient and / or family today.     Assessment:  Emily Ordaz is a 67 y.o. female presenting with left knee pain.   History, physical and radiographs are consistent with a likely diagnosis of mild OA.   Plan: home exercises from before, ice, voltaren gel. Continue conservative management for pain.   Follow up as needed. All questions answered.      Primary osteoarthritis of both knees    Sciatic pain, left  -     Ambulatory referral/consult to [facility]; Future; Expected date: 10/13/2022       A copy of today's visit note has been sent to the referring provider.     Electronically signed:  Perfecto Hankins MD, MPH  10/06/2022  9:04 AM

## 2022-10-06 NOTE — PATIENT INSTRUCTIONS
Assessment:  Emily Ordaz is a 67 y.o. female   Chief Complaint   Patient presents with    Left Knee - Pain       Encounter Diagnosis   Name Primary?    Sciatic pain, left Yes        Plan:  Referral to [facility] Spine clinic  Apply topical diclofenac (Voltaren) up to 4 times a day to the affected area.  It can be bought over the counter at any local pharmacy.    Continue with ice as needed   Continue with previously prescribed home exercise program.    Follow-up: If you feel like you need us feel free to reach out through [facility] or feel free to contact us at [phone] or sooner if there are any problems between now and then.    Thank you for choosing [facility] and Dr. Perfecto Hankins for your orthopedic & sports medicine care. It is our goal to provide you with exceptional care that will help keep you healthy, active, and get you back in the game.    Please do not hesitate to reach out to us via email, phone, or [facility] with any questions, concerns, or feedback.    If you felt that you received exemplary care today, please consider leaving us feedback on [employer] at:  [email or web address]    If you are experiencing pain/discomfort ,or have questions after 5pm and would like to be connected to the [facility] on-call team, please call this number and specify which Sports Medicine provider is treating you: [phone]

## 2022-10-19 ENCOUNTER — OFFICE VISIT (OUTPATIENT)
Dept: FAMILY MEDICINE | Facility: CLINIC | Age: 67
End: 2022-10-19
Payer: MEDICARE

## 2022-10-19 VITALS
HEART RATE: 76 BPM | BODY MASS INDEX: 23.24 KG/M2 | OXYGEN SATURATION: 99 % | WEIGHT: 136.13 LBS | DIASTOLIC BLOOD PRESSURE: 74 MMHG | TEMPERATURE: 98 F | SYSTOLIC BLOOD PRESSURE: 126 MMHG | HEIGHT: 64 IN

## 2022-10-19 DIAGNOSIS — F17.200 SMOKER: ICD-10-CM

## 2022-10-19 DIAGNOSIS — M85.80 OSTEOPENIA, UNSPECIFIED LOCATION: Primary | ICD-10-CM

## 2022-10-19 PROCEDURE — 1101F PT FALLS ASSESS-DOCD LE1/YR: CPT | Mod: CPTII,S$GLB,, | Performed by: FAMILY MEDICINE

## 2022-10-19 PROCEDURE — 1159F MED LIST DOCD IN RCRD: CPT | Mod: CPTII,S$GLB,, | Performed by: FAMILY MEDICINE

## 2022-10-19 PROCEDURE — 3074F PR MOST RECENT SYSTOLIC BLOOD PRESSURE < 130 MM HG: ICD-10-PCS | Mod: CPTII,S$GLB,, | Performed by: FAMILY MEDICINE

## 2022-10-19 PROCEDURE — 3044F HG A1C LEVEL LT 7.0%: CPT | Mod: CPTII,S$GLB,, | Performed by: FAMILY MEDICINE

## 2022-10-19 PROCEDURE — 3288F PR FALLS RISK ASSESSMENT DOCUMENTED: ICD-10-PCS | Mod: CPTII,S$GLB,, | Performed by: FAMILY MEDICINE

## 2022-10-19 PROCEDURE — 3078F DIAST BP <80 MM HG: CPT | Mod: CPTII,S$GLB,, | Performed by: FAMILY MEDICINE

## 2022-10-19 PROCEDURE — 1160F PR REVIEW ALL MEDS BY PRESCRIBER/CLIN PHARMACIST DOCUMENTED: ICD-10-PCS | Mod: CPTII,S$GLB,, | Performed by: FAMILY MEDICINE

## 2022-10-19 PROCEDURE — 99214 OFFICE O/P EST MOD 30 MIN: CPT | Mod: S$GLB,,, | Performed by: FAMILY MEDICINE

## 2022-10-19 PROCEDURE — 3044F PR MOST RECENT HEMOGLOBIN A1C LEVEL <7.0%: ICD-10-PCS | Mod: CPTII,S$GLB,, | Performed by: FAMILY MEDICINE

## 2022-10-19 PROCEDURE — 1160F RVW MEDS BY RX/DR IN RCRD: CPT | Mod: CPTII,S$GLB,, | Performed by: FAMILY MEDICINE

## 2022-10-19 PROCEDURE — 3288F FALL RISK ASSESSMENT DOCD: CPT | Mod: CPTII,S$GLB,, | Performed by: FAMILY MEDICINE

## 2022-10-19 PROCEDURE — 99214 PR OFFICE/OUTPT VISIT, EST, LEVL IV, 30-39 MIN: ICD-10-PCS | Mod: S$GLB,,, | Performed by: FAMILY MEDICINE

## 2022-10-19 PROCEDURE — 99999 PR PBB SHADOW E&M-EST. PATIENT-LVL IV: ICD-10-PCS | Mod: PBBFAC,,, | Performed by: FAMILY MEDICINE

## 2022-10-19 PROCEDURE — 99999 PR PBB SHADOW E&M-EST. PATIENT-LVL IV: CPT | Mod: PBBFAC,,, | Performed by: FAMILY MEDICINE

## 2022-10-19 PROCEDURE — 1159F PR MEDICATION LIST DOCUMENTED IN MEDICAL RECORD: ICD-10-PCS | Mod: CPTII,S$GLB,, | Performed by: FAMILY MEDICINE

## 2022-10-19 PROCEDURE — 3074F SYST BP LT 130 MM HG: CPT | Mod: CPTII,S$GLB,, | Performed by: FAMILY MEDICINE

## 2022-10-19 PROCEDURE — 4010F PR ACE/ARB THEARPY RXD/TAKEN: ICD-10-PCS | Mod: CPTII,S$GLB,, | Performed by: FAMILY MEDICINE

## 2022-10-19 PROCEDURE — 1101F PR PT FALLS ASSESS DOC 0-1 FALLS W/OUT INJ PAST YR: ICD-10-PCS | Mod: CPTII,S$GLB,, | Performed by: FAMILY MEDICINE

## 2022-10-19 PROCEDURE — 3078F PR MOST RECENT DIASTOLIC BLOOD PRESSURE < 80 MM HG: ICD-10-PCS | Mod: CPTII,S$GLB,, | Performed by: FAMILY MEDICINE

## 2022-10-19 PROCEDURE — 4010F ACE/ARB THERAPY RXD/TAKEN: CPT | Mod: CPTII,S$GLB,, | Performed by: FAMILY MEDICINE

## 2022-10-19 RX ORDER — ALENDRONATE SODIUM 70 MG/1
70 TABLET ORAL
Qty: 4 TABLET | Refills: 11 | Status: SHIPPED | OUTPATIENT
Start: 2022-10-19 | End: 2023-05-10

## 2022-10-19 NOTE — PROGRESS NOTES
Chief Complaint:    Chief Complaint   Patient presents with    Follow-up       History of Present Illness:  Patient presents today for a follow up    Here to discussed her bone density scan. No stomach issues or heartburn. Taking vitamin D3. She has cut back on smoking.   Patient would like to be checked for iron deficiency. She's been told her eyes are pale. Denies any symptoms.     DEXA scan reveals that she is osteopenic almost as close to becoming osteoporotic.    ROS:  Review of Systems   Constitutional:  Negative for appetite change, chills and fever.   HENT:  Negative for congestion, ear pain, postnasal drip, rhinorrhea, sinus pressure and sinus pain.    Eyes:  Negative for pain.   Respiratory:  Negative for cough, chest tightness and shortness of breath.    Cardiovascular:  Negative for chest pain and palpitations.   Gastrointestinal:  Negative for abdominal pain, blood in stool, constipation, diarrhea and nausea.   Genitourinary:  Negative for difficulty urinating, dysuria, flank pain and hematuria.   Musculoskeletal:  Negative for arthralgias and myalgias.   Skin:  Negative for pallor and wound.   Neurological:  Negative for dizziness, tremors, speech difficulty, light-headedness and headaches.   Psychiatric/Behavioral:  Negative for behavioral problems, dysphoric mood and sleep disturbance. The patient is not nervous/anxious.    All other systems reviewed and are negative.    Past Medical History:   Diagnosis Date    Arthritis     Hyperlipidemia     Hypertension     Osteoporosis        Social History:  Social History     Socioeconomic History    Marital status:    Tobacco Use    Smoking status: Some Days     Packs/day: 0.50     Years: 45.00     Pack years: 22.50     Types: Cigarettes    Smokeless tobacco: Never   Substance and Sexual Activity    Alcohol use: No     Alcohol/week: 0.0 standard drinks    Drug use: No    Sexual activity: Never     Partners: Male     Birth control/protection: None  "      Family History:   family history includes Breast cancer in her paternal aunt.    Health Maintenance   Topic Date Due    Lipid Panel  08/29/2020    LDCT Lung Screen  12/06/2022    Mammogram  06/24/2023    DEXA Scan  09/22/2025    TETANUS VACCINE  02/26/2028    Hepatitis C Screening  Completed       Physical Exam:    Vital Signs  Temp: 97.5 °F (36.4 °C)  Temp src: Temporal  Pulse: 76  SpO2: 99 %  BP: 126/74  BP Location: Left arm  Patient Position: Sitting  Height and Weight  Height: 5' 4" (162.6 cm)  Weight: 61.7 kg (136 lb 2.1 oz)  BSA (Calculated - sq m): 1.67 sq meters  BMI (Calculated): 23.4  Weight in (lb) to have BMI = 25: 145.3]    Body mass index is 23.37 kg/m².    Physical Exam  Vitals and nursing note reviewed.   Constitutional:       Appearance: Normal appearance. She is not toxic-appearing.   HENT:      Head: Normocephalic and atraumatic.      Right Ear: Tympanic membrane normal.      Left Ear: Tympanic membrane normal.   Eyes:      Extraocular Movements: Extraocular movements intact.      Pupils: Pupils are equal, round, and reactive to light.   Cardiovascular:      Rate and Rhythm: Normal rate and regular rhythm.      Heart sounds: Normal heart sounds.   Pulmonary:      Effort: Pulmonary effort is normal.      Breath sounds: Normal breath sounds. No wheezing, rhonchi or rales.   Abdominal:      General: Bowel sounds are normal. There is no distension.      Palpations: Abdomen is soft.      Tenderness: There is no abdominal tenderness.   Musculoskeletal:         General: Normal range of motion.      Cervical back: Normal range of motion.   Skin:     General: Skin is warm and dry.      Capillary Refill: Capillary refill takes less than 2 seconds.   Neurological:      General: No focal deficit present.      Mental Status: She is alert and oriented to person, place, and time.   Psychiatric:         Mood and Affect: Mood normal.         Behavior: Behavior normal.         Judgment: Judgment normal. "         Assessment:      ICD-10-CM ICD-9-CM   1. Osteopenia, unspecified location  M85.80 733.90   2. Smoker  F17.200 305.1     Plan:     Recommend Fosamax 70 mg for osteopenia. Explained to patient how to take the pill. Add calcium and vitamin D to diet. She can eat green, leafy vegetables and take at least 2000 units of vitamin D/day. Start weight training or go for walks with ankle weights. Avoid sitting for long periods of time.  Start on Fosamax 70 mg orally to take once a week over the full glass of water to be taken on an empty stomach.  Must sit upright after taking the medication for at least 30 min.  Common side effects include symptoms of heartburn.  Should there be any muscle pain or fatigue or stomach pain please discontinue the medication and contact us.  Typically this will take at least 3 years to see any changes in the bone density.  This must be combined with stress bearing exercises and adequate calcium intake through diet.  Okay to take vitamin-D supplements usually about 2000 units over-the-counter is adequate.    Recommend smoking cessation.  No orders of the defined types were placed in this encounter.    Current Outpatient Medications   Medication Sig Dispense Refill    cholecalciferol, vitamin D3, (VITAMIN D3 ORAL) Take by mouth.      citalopram (CELEXA) 20 MG tablet TAKE 1 TABLET BY MOUTH EVERY DAY 90 tablet 3    diclofenac sodium (VOLTAREN) 1 % Gel Apply 2 g topically once daily. 1 Tube 2    diclofenac sodium 100 mg 24 hr tablet diclofenac sodium Take 1 tablet (oral) 1 time per day for 14 days 20221003 tablet extended release 24 hr 1 time per day oral 14 days active 100 mg      evolocumab (REPATHA SURECLICK) 140 mg/mL PnIj Inject 1 mL (140 mg total) into the skin every 14 (fourteen) days. 2 mL 6    losartan (COZAAR) 100 MG tablet Take 1 tablet (100 mg total) by mouth once daily. 90 tablet 1    meloxicam (MOBIC) 7.5 MG tablet meloxicam Take 1 Tablet (oral) 1 time per day PRN - Pain 20220613  tablet 1 time per day oral No set duration recorded No set duration amount recorded suspended 7.5 mg      methocarbamoL (ROBAXIN) 750 MG Tab methocarbamol Take 1 tablet (oral) 2 times per day PRN for 10 days 20220613 tablet 2 times per day oral 10 days suspended 750 mg      methylPREDNISolone (MEDROL DOSEPACK) 4 mg tablet use as directed 1 each 0    metoprolol succinate (TOPROL-XL) 25 MG 24 hr tablet TAKE 1/2 TABLET BY MOUTH EVERY  tablet 0    potassium 99 mg Tab potassium Take No date recorded No form recorded No frequency recorded No route recorded No set duration recorded No set duration amount recorded active No dosage strength recorded No dosage strength units of measure recorded      potassium chloride (MICRO-K) 10 MEQ CpSR TAKE TWO CAPSULES BY MOUTH EVERY  capsule 2    predniSONE (DELTASONE) 20 MG tablet prednisone Take 2 tablet (oral) 1 time per day for 4 days 20220404 tablet 1 time per day oral 4 days suspended 20 MG      triamcinolone acetonide 0.1% (KENALOG) 0.1 % cream Apply topically 2 (two) times daily. for 10 days 1 each 1    triamterene-hydrochlorothiazide 37.5-25 mg (MAXZIDE-25) 37.5-25 mg per tablet TAKE 1/2 TABLET BY MOUTH EVERY DAY 45 tablet 1    TURMERIC ORAL Take by mouth.      alendronate (FOSAMAX) 70 MG tablet Take 1 tablet (70 mg total) by mouth every 7 days. 4 tablet 11     No current facility-administered medications for this visit.       There are no discontinued medications.    No follow-ups on file.      Alonso Cortez MD  Scribe Attestation:   I, Domenica Joseph, am scribing for, and in the presence of, Dr.Arif Cortez I performed the above scribed service and the documentation accurately describes the services I performed. I attest to the accuracy of the note.    I, Dr. Alonso Cortez, reviewed documentation as scribed above. I performed the services described in this documentation.  I agree that the record reflects my personal performance and is accurate and complete. Alonso  MD Diego.  10/19/2022

## 2022-11-15 ENCOUNTER — TELEPHONE (OUTPATIENT)
Dept: ADMINISTRATIVE | Facility: HOSPITAL | Age: 67
End: 2022-11-15
Payer: MEDICARE

## 2022-11-23 ENCOUNTER — PES CALL (OUTPATIENT)
Dept: ADMINISTRATIVE | Facility: CLINIC | Age: 67
End: 2022-11-23
Payer: MEDICARE

## 2022-12-06 RX ORDER — LOSARTAN POTASSIUM 100 MG/1
TABLET ORAL
Qty: 90 TABLET | Refills: 0 | Status: SHIPPED | OUTPATIENT
Start: 2022-12-06 | End: 2023-02-10

## 2022-12-06 NOTE — TELEPHONE ENCOUNTER
No new care gaps identified.  [facility] Embedded Care Gaps. Reference number: [id number]. 12/06/2022   8:37:05 AM CST

## 2022-12-07 NOTE — TELEPHONE ENCOUNTER
Refill Decision Note   Emily Orlin  is requesting a refill authorization.  Brief Assessment and Rationale for Refill:  Approve     Medication Therapy Plan:       Medication Reconciliation Completed: No   Comments:     No Care Gaps recommended.     Note composed:10:00 PM 12/06/2022

## 2023-02-07 DIAGNOSIS — Z00.00 ENCOUNTER FOR MEDICARE ANNUAL WELLNESS EXAM: ICD-10-CM

## 2023-02-08 DIAGNOSIS — I10 HTN (HYPERTENSION): ICD-10-CM

## 2023-02-09 ENCOUNTER — PATIENT MESSAGE (OUTPATIENT)
Dept: FAMILY MEDICINE | Facility: CLINIC | Age: 68
End: 2023-02-09

## 2023-02-09 ENCOUNTER — OFFICE VISIT (OUTPATIENT)
Dept: FAMILY MEDICINE | Facility: CLINIC | Age: 68
End: 2023-02-09
Payer: MEDICARE

## 2023-02-09 VITALS
OXYGEN SATURATION: 93 % | DIASTOLIC BLOOD PRESSURE: 70 MMHG | HEART RATE: 85 BPM | HEIGHT: 64 IN | BODY MASS INDEX: 22.75 KG/M2 | WEIGHT: 133.25 LBS | SYSTOLIC BLOOD PRESSURE: 130 MMHG

## 2023-02-09 DIAGNOSIS — Z00.00 ENCOUNTER FOR MEDICARE ANNUAL WELLNESS EXAM: ICD-10-CM

## 2023-02-09 DIAGNOSIS — J20.9 ACUTE BRONCHITIS, UNSPECIFIED ORGANISM: Primary | ICD-10-CM

## 2023-02-09 DIAGNOSIS — J10.1 INFLUENZA B: ICD-10-CM

## 2023-02-09 LAB
CTP QC/QA: YES
CTP QC/QA: YES
FLUAV AG NPH QL: NEGATIVE
FLUBV AG NPH QL: POSITIVE
SARS-COV-2 RDRP RESP QL NAA+PROBE: NEGATIVE

## 2023-02-09 PROCEDURE — 1159F PR MEDICATION LIST DOCUMENTED IN MEDICAL RECORD: ICD-10-PCS | Mod: HCNC,CPTII,S$GLB, | Performed by: FAMILY MEDICINE

## 2023-02-09 PROCEDURE — 3075F SYST BP GE 130 - 139MM HG: CPT | Mod: HCNC,CPTII,S$GLB, | Performed by: FAMILY MEDICINE

## 2023-02-09 PROCEDURE — 87804 INFLUENZA ASSAY W/OPTIC: CPT | Mod: 59,QW,HCNC,S$GLB | Performed by: FAMILY MEDICINE

## 2023-02-09 PROCEDURE — 3075F PR MOST RECENT SYSTOLIC BLOOD PRESS GE 130-139MM HG: ICD-10-PCS | Mod: HCNC,CPTII,S$GLB, | Performed by: FAMILY MEDICINE

## 2023-02-09 PROCEDURE — 99999 PR PBB SHADOW E&M-EST. PATIENT-LVL IV: CPT | Mod: PBBFAC,HCNC,, | Performed by: FAMILY MEDICINE

## 2023-02-09 PROCEDURE — 3078F DIAST BP <80 MM HG: CPT | Mod: HCNC,CPTII,S$GLB, | Performed by: FAMILY MEDICINE

## 2023-02-09 PROCEDURE — 1101F PR PT FALLS ASSESS DOC 0-1 FALLS W/OUT INJ PAST YR: ICD-10-PCS | Mod: HCNC,CPTII,S$GLB, | Performed by: FAMILY MEDICINE

## 2023-02-09 PROCEDURE — 3008F BODY MASS INDEX DOCD: CPT | Mod: HCNC,CPTII,S$GLB, | Performed by: FAMILY MEDICINE

## 2023-02-09 PROCEDURE — 87804 POCT INFLUENZA A/B: ICD-10-PCS | Mod: 59,QW,HCNC,S$GLB | Performed by: FAMILY MEDICINE

## 2023-02-09 PROCEDURE — 99213 OFFICE O/P EST LOW 20 MIN: CPT | Mod: HCNC,S$GLB,, | Performed by: FAMILY MEDICINE

## 2023-02-09 PROCEDURE — 1159F MED LIST DOCD IN RCRD: CPT | Mod: HCNC,CPTII,S$GLB, | Performed by: FAMILY MEDICINE

## 2023-02-09 PROCEDURE — 3288F PR FALLS RISK ASSESSMENT DOCUMENTED: ICD-10-PCS | Mod: HCNC,CPTII,S$GLB, | Performed by: FAMILY MEDICINE

## 2023-02-09 PROCEDURE — 1126F AMNT PAIN NOTED NONE PRSNT: CPT | Mod: HCNC,CPTII,S$GLB, | Performed by: FAMILY MEDICINE

## 2023-02-09 PROCEDURE — 99213 PR OFFICE/OUTPT VISIT, EST, LEVL III, 20-29 MIN: ICD-10-PCS | Mod: HCNC,S$GLB,, | Performed by: FAMILY MEDICINE

## 2023-02-09 PROCEDURE — 87635: ICD-10-PCS | Mod: QW,HCNC,S$GLB, | Performed by: FAMILY MEDICINE

## 2023-02-09 PROCEDURE — 87635 SARS-COV-2 COVID-19 AMP PRB: CPT | Mod: QW,HCNC,S$GLB, | Performed by: FAMILY MEDICINE

## 2023-02-09 PROCEDURE — 99999 PR PBB SHADOW E&M-EST. PATIENT-LVL IV: ICD-10-PCS | Mod: PBBFAC,HCNC,, | Performed by: FAMILY MEDICINE

## 2023-02-09 PROCEDURE — 3008F PR BODY MASS INDEX (BMI) DOCUMENTED: ICD-10-PCS | Mod: HCNC,CPTII,S$GLB, | Performed by: FAMILY MEDICINE

## 2023-02-09 PROCEDURE — 3288F FALL RISK ASSESSMENT DOCD: CPT | Mod: HCNC,CPTII,S$GLB, | Performed by: FAMILY MEDICINE

## 2023-02-09 PROCEDURE — 3078F PR MOST RECENT DIASTOLIC BLOOD PRESSURE < 80 MM HG: ICD-10-PCS | Mod: HCNC,CPTII,S$GLB, | Performed by: FAMILY MEDICINE

## 2023-02-09 PROCEDURE — 1126F PR PAIN SEVERITY QUANTIFIED, NO PAIN PRESENT: ICD-10-PCS | Mod: HCNC,CPTII,S$GLB, | Performed by: FAMILY MEDICINE

## 2023-02-09 PROCEDURE — 1101F PT FALLS ASSESS-DOCD LE1/YR: CPT | Mod: HCNC,CPTII,S$GLB, | Performed by: FAMILY MEDICINE

## 2023-02-09 RX ORDER — PREDNISONE 50 MG/1
50 TABLET ORAL DAILY
Qty: 5 TABLET | Refills: 0 | Status: SHIPPED | OUTPATIENT
Start: 2023-02-09 | End: 2023-02-14

## 2023-02-09 RX ORDER — OSELTAMIVIR PHOSPHATE 75 MG/1
75 CAPSULE ORAL 2 TIMES DAILY
Qty: 10 CAPSULE | Refills: 0 | Status: SHIPPED | OUTPATIENT
Start: 2023-02-09 | End: 2023-02-14

## 2023-02-09 NOTE — PROGRESS NOTES
Chief Complaint:    Chief Complaint   Patient presents with    Sore Throat    sneezing       History of Present Illness:  Patient presents to clinic for URI Sx    Has been 3-4 days  Productive Cough, sore throat, sneezing  Mucinex was helping  No SOB, CP, n/v/d, fever, HA  Had Flu in December   Given inhaler but never used it    Has quit smoking since December 2022    BP at home has been 130's at home      ROS:  Review of Systems   Constitutional:  Negative for appetite change, chills and fever.   HENT:  Positive for sneezing and sore throat. Negative for congestion, ear pain, postnasal drip, rhinorrhea, sinus pressure and sinus pain.    Eyes:  Negative for pain.   Respiratory:  Positive for cough. Negative for chest tightness and shortness of breath.    Cardiovascular:  Negative for chest pain and palpitations.   Gastrointestinal:  Negative for abdominal pain, blood in stool, constipation, diarrhea and nausea.   Genitourinary:  Negative for difficulty urinating, dysuria, flank pain and hematuria.   Musculoskeletal:  Negative for arthralgias and myalgias.   Skin:  Negative for pallor and wound.   Neurological:  Negative for dizziness, tremors, speech difficulty, light-headedness and headaches.   Psychiatric/Behavioral:  Negative for behavioral problems, dysphoric mood and sleep disturbance. The patient is not nervous/anxious.    All other systems reviewed and are negative.    Past Medical History:   Diagnosis Date    Arthritis     Hyperlipidemia     Hypertension     Osteoporosis        Social History:  Social History     Socioeconomic History    Marital status:    Tobacco Use    Smoking status: Some Days     Packs/day: 0.50     Years: 45.00     Pack years: 22.50     Types: Cigarettes    Smokeless tobacco: Never   Substance and Sexual Activity    Alcohol use: No     Alcohol/week: 0.0 standard drinks    Drug use: No    Sexual activity: Never     Partners: Male     Birth control/protection: None       Family  "History:   family history includes Breast cancer in her paternal aunt.    Health Maintenance   Topic Date Due    Lipid Panel  08/29/2020    LDCT Lung Screen  12/06/2022    Mammogram  06/24/2023    DEXA Scan  09/22/2025    TETANUS VACCINE  02/26/2028    Hepatitis C Screening  Completed       Physical Exam:    Vital Signs  Pulse: 85  SpO2: (!) 93 %  BP: 130/70  BP Location: Right arm  Patient Position: Sitting  Pain Score: 0-No pain  Height and Weight  Height: 5' 4" (162.6 cm)  Weight: 60.5 kg (133 lb 4.3 oz)  BSA (Calculated - sq m): 1.65 sq meters  BMI (Calculated): 22.9  Weight in (lb) to have BMI = 25: 145.3]    Body mass index is 22.88 kg/m².    Physical Exam  Vitals and nursing note reviewed.   Constitutional:       Appearance: Normal appearance. She is not toxic-appearing.   HENT:      Head: Normocephalic and atraumatic.      Right Ear: Tympanic membrane normal.      Left Ear: Tympanic membrane normal.   Eyes:      Extraocular Movements: Extraocular movements intact.      Pupils: Pupils are equal, round, and reactive to light.   Cardiovascular:      Rate and Rhythm: Normal rate and regular rhythm.      Heart sounds: Normal heart sounds.   Pulmonary:      Effort: Pulmonary effort is normal.      Breath sounds: Wheezing present. No rhonchi or rales.   Abdominal:      General: Bowel sounds are normal. There is no distension.      Palpations: Abdomen is soft.      Tenderness: There is no abdominal tenderness.   Musculoskeletal:         General: Normal range of motion.      Cervical back: Normal range of motion.   Skin:     General: Skin is warm and dry.      Capillary Refill: Capillary refill takes less than 2 seconds.   Neurological:      General: No focal deficit present.      Mental Status: She is alert and oriented to person, place, and time.   Psychiatric:         Mood and Affect: Mood normal.         Behavior: Behavior normal.         Judgment: Judgment normal.         Assessment:      ICD-10-CM ICD-9-CM   1. " Acute bronchitis, unspecified organism  J20.9 466.0   2. Influenza B  J10.1 487.1       Plan:  COVID negative, Flu pending  Start Prednisone for bronchitis  Will send Tamiflu      Orders Placed This Encounter    POCT Influenza A/B    POCT COVID-19 Rapid Screening    predniSONE (DELTASONE) 50 MG Tab    oseltamivir (TAMIFLU) 75 MG capsule        Current Outpatient Medications   Medication Sig Dispense Refill    alendronate (FOSAMAX) 70 MG tablet Take 1 tablet (70 mg total) by mouth every 7 days. 4 tablet 11    cholecalciferol, vitamin D3, (VITAMIN D3 ORAL) Take by mouth.      citalopram (CELEXA) 20 MG tablet TAKE 1 TABLET BY MOUTH EVERY DAY 90 tablet 2    diclofenac sodium (VOLTAREN) 1 % Gel Apply 2 g topically once daily. 1 Tube 2    diclofenac sodium 100 mg 24 hr tablet diclofenac sodium Take 1 tablet (oral) 1 time per day for 14 days 20221003 tablet extended release 24 hr 1 time per day oral 14 days active 100 mg      evolocumab (REPATHA SURECLICK) 140 mg/mL PnIj Inject 1 mL (140 mg total) into the skin every 14 (fourteen) days. 2 mL 6    losartan (COZAAR) 100 MG tablet TAKE ONE TABLET BY MOUTH DAILY 90 tablet 0    meloxicam (MOBIC) 7.5 MG tablet meloxicam Take 1 Tablet (oral) 1 time per day PRN - Pain 20220613 tablet 1 time per day oral No set duration recorded No set duration amount recorded suspended 7.5 mg      methocarbamoL (ROBAXIN) 750 MG Tab methocarbamol Take 1 tablet (oral) 2 times per day PRN for 10 days 20220613 tablet 2 times per day oral 10 days suspended 750 mg      methylPREDNISolone (MEDROL DOSEPACK) 4 mg tablet use as directed 1 each 0    metoprolol succinate (TOPROL-XL) 25 MG 24 hr tablet TAKE 1/2 TABLET BY MOUTH EVERY DAY 45 tablet 3    potassium 99 mg Tab potassium Take No date recorded No form recorded No frequency recorded No route recorded No set duration recorded No set duration amount recorded active No dosage strength recorded No dosage strength units of measure recorded      potassium  chloride (MICRO-K) 10 MEQ CpSR TAKE TWO CAPSULES BY MOUTH EVERY  capsule 0    predniSONE (DELTASONE) 20 MG tablet prednisone Take 2 tablet (oral) 1 time per day for 4 days 20220404 tablet 1 time per day oral 4 days suspended 20 MG      triamterene-hydrochlorothiazide 37.5-25 mg (MAXZIDE-25) 37.5-25 mg per tablet TAKE 1/2 TABLET BY MOUTH EVERY DAY 45 tablet 0    TURMERIC ORAL Take by mouth.      oseltamivir (TAMIFLU) 75 MG capsule Take 1 capsule (75 mg total) by mouth 2 (two) times daily. for 5 days 10 capsule 0    predniSONE (DELTASONE) 50 MG Tab Take 1 tablet (50 mg total) by mouth once daily. for 5 days 5 tablet 0    triamcinolone acetonide 0.1% (KENALOG) 0.1 % cream Apply topically 2 (two) times daily. for 10 days 1 each 1     No current facility-administered medications for this visit.       There are no discontinued medications.    No follow-ups on file.      Alonso Cortez MD   Scribe Attestation:   I, Gavin Nickerson, am scribing for, and in the presence of, Dr.Arif Cortez I performed the above scribed service and the documentation accurately describes the services I performed. I attest to the accuracy of the note.    I, Dr. Alonso Cortez, reviewed documentation as scribed above. I performed the services described in this documentation.  I agree that the record reflects my personal performance and is accurate and complete. Alonso Cortez MD.  01/06/2023

## 2023-05-10 RX ORDER — ALENDRONATE SODIUM 70 MG/1
TABLET ORAL
Qty: 12 TABLET | Refills: 3 | Status: SHIPPED | OUTPATIENT
Start: 2023-05-10

## 2023-05-10 RX ORDER — METOPROLOL SUCCINATE 25 MG/1
TABLET, EXTENDED RELEASE ORAL
Qty: 45 TABLET | Refills: 3 | Status: SHIPPED | OUTPATIENT
Start: 2023-05-10

## 2023-05-10 NOTE — TELEPHONE ENCOUNTER
Refill Routing Note   Medication(s) are not appropriate for processing by [facility] for the following reason(s):      Medication outside of protocol    [facility] action(s):  Route  Approve Care Due:  Labs due   Medication Therapy Plan: CMP outdated      Appointments  past 12m or future 3m with PCP    Date Provider   Last Visit   2/9/2023 Alonso Cortez MD   Next Visit   Visit date not found Alonso Cortez MD   ED visits in past 90 days: 0        Note composed:1:34 PM 05/10/2023

## 2023-05-10 NOTE — TELEPHONE ENCOUNTER
Care Due:                  Date            Visit Type   Department     Provider  --------------------------------------------------------------------------------                                MYCHART                              FOLLOWUP/OF  [facility] [place] [facility]  Last Visit: 02-      FICE VISIT   MEDICINE       Alonso Cortez  Next Visit: None Scheduled  None         None Found                                                            Last  Test          Frequency    Reason                     Performed    Due Date  --------------------------------------------------------------------------------    CMP.........  12 months..  losartan,                  01- 01-                             triamterene-hydrochloroth                             galdinoe...................    [employer] Embedded Care Due Messages. Reference number: [id number].   5/10/2023 9:54:34 AM CDT

## 2023-05-17 RX ORDER — TRIAMTERENE/HYDROCHLOROTHIAZID 37.5-25 MG
TABLET ORAL
Qty: 45 TABLET | Refills: 0 | Status: SHIPPED | OUTPATIENT
Start: 2023-05-17 | End: 2023-10-30

## 2023-05-17 RX ORDER — LOSARTAN POTASSIUM 100 MG/1
TABLET ORAL
Qty: 90 TABLET | Refills: 0 | Status: SHIPPED | OUTPATIENT
Start: 2023-05-17 | End: 2023-10-30

## 2023-05-17 NOTE — TELEPHONE ENCOUNTER
No care due was identified.  [facility] Embedded Care Due Messages. Reference number: [id number].   5/17/2023 4:00:58 PM CDT

## 2023-07-17 ENCOUNTER — LAB VISIT (OUTPATIENT)
Dept: LAB | Facility: HOSPITAL | Age: 68
End: 2023-07-17
Attending: FAMILY MEDICINE
Payer: MEDICARE

## 2023-07-17 ENCOUNTER — OFFICE VISIT (OUTPATIENT)
Dept: FAMILY MEDICINE | Facility: CLINIC | Age: 68
End: 2023-07-17
Payer: MEDICARE

## 2023-07-17 ENCOUNTER — TELEPHONE (OUTPATIENT)
Dept: PAIN MEDICINE | Facility: CLINIC | Age: 68
End: 2023-07-17
Payer: MEDICARE

## 2023-07-17 VITALS
SYSTOLIC BLOOD PRESSURE: 128 MMHG | HEIGHT: 64 IN | HEART RATE: 78 BPM | BODY MASS INDEX: 22.99 KG/M2 | DIASTOLIC BLOOD PRESSURE: 78 MMHG | OXYGEN SATURATION: 100 % | WEIGHT: 134.69 LBS

## 2023-07-17 DIAGNOSIS — F17.200 SMOKER: ICD-10-CM

## 2023-07-17 DIAGNOSIS — I10 PRIMARY HYPERTENSION: ICD-10-CM

## 2023-07-17 DIAGNOSIS — Z12.2 ENCOUNTER FOR SCREENING FOR LUNG CANCER: ICD-10-CM

## 2023-07-17 DIAGNOSIS — G89.29 CHRONIC SI JOINT PAIN: ICD-10-CM

## 2023-07-17 DIAGNOSIS — E78.2 MIXED HYPERLIPIDEMIA: ICD-10-CM

## 2023-07-17 DIAGNOSIS — Z12.31 BREAST CANCER SCREENING BY MAMMOGRAM: ICD-10-CM

## 2023-07-17 DIAGNOSIS — F17.200 SMOKING: ICD-10-CM

## 2023-07-17 DIAGNOSIS — Z00.00 WELL ADULT EXAM: Primary | ICD-10-CM

## 2023-07-17 DIAGNOSIS — M53.3 CHRONIC SI JOINT PAIN: ICD-10-CM

## 2023-07-17 LAB
ALBUMIN SERPL BCP-MCNC: 4.2 G/DL (ref 3.5–5.2)
ALP SERPL-CCNC: 70 U/L (ref 55–135)
ALT SERPL W/O P-5'-P-CCNC: 22 U/L (ref 10–44)
ANION GAP SERPL CALC-SCNC: 11 MMOL/L (ref 8–16)
AST SERPL-CCNC: 25 U/L (ref 10–40)
BASOPHILS # BLD AUTO: 0.05 K/UL (ref 0–0.2)
BASOPHILS NFR BLD: 0.7 % (ref 0–1.9)
BILIRUB SERPL-MCNC: 0.5 MG/DL (ref 0.1–1)
BUN SERPL-MCNC: 16 MG/DL (ref 8–23)
CALCIUM SERPL-MCNC: 10 MG/DL (ref 8.7–10.5)
CHLORIDE SERPL-SCNC: 103 MMOL/L (ref 95–110)
CHOLEST SERPL-MCNC: 269 MG/DL (ref 120–199)
CHOLEST/HDLC SERPL: 5.3 {RATIO} (ref 2–5)
CO2 SERPL-SCNC: 25 MMOL/L (ref 23–29)
CREAT SERPL-MCNC: 0.9 MG/DL (ref 0.5–1.4)
DIFFERENTIAL METHOD: ABNORMAL
EOSINOPHIL # BLD AUTO: 0.2 K/UL (ref 0–0.5)
EOSINOPHIL NFR BLD: 2.9 % (ref 0–8)
ERYTHROCYTE [DISTWIDTH] IN BLOOD BY AUTOMATED COUNT: 12.8 % (ref 11.5–14.5)
EST. GFR  (NO RACE VARIABLE): >60 ML/MIN/1.73 M^2
ESTIMATED AVG GLUCOSE: 100 MG/DL (ref 68–131)
GLUCOSE SERPL-MCNC: 85 MG/DL (ref 70–110)
HBA1C MFR BLD: 5.1 % (ref 4–5.6)
HCT VFR BLD AUTO: 38.2 % (ref 37–48.5)
HDLC SERPL-MCNC: 51 MG/DL (ref 40–75)
HDLC SERPL: 19 % (ref 20–50)
HGB BLD-MCNC: 12.8 G/DL (ref 12–16)
IMM GRANULOCYTES # BLD AUTO: 0.02 K/UL (ref 0–0.04)
IMM GRANULOCYTES NFR BLD AUTO: 0.3 % (ref 0–0.5)
LDLC SERPL CALC-MCNC: 153.2 MG/DL (ref 63–159)
LYMPHOCYTES # BLD AUTO: 2.5 K/UL (ref 1–4.8)
LYMPHOCYTES NFR BLD: 34.6 % (ref 18–48)
MCH RBC QN AUTO: 31.1 PG (ref 27–31)
MCHC RBC AUTO-ENTMCNC: 33.5 G/DL (ref 32–36)
MCV RBC AUTO: 93 FL (ref 82–98)
MONOCYTES # BLD AUTO: 0.5 K/UL (ref 0.3–1)
MONOCYTES NFR BLD: 6.9 % (ref 4–15)
NEUTROPHILS # BLD AUTO: 4 K/UL (ref 1.8–7.7)
NEUTROPHILS NFR BLD: 54.6 % (ref 38–73)
NONHDLC SERPL-MCNC: 218 MG/DL
NRBC BLD-RTO: 0 /100 WBC
PLATELET # BLD AUTO: 228 K/UL (ref 150–450)
PMV BLD AUTO: 11 FL (ref 9.2–12.9)
POTASSIUM SERPL-SCNC: 4.6 MMOL/L (ref 3.5–5.1)
PROT SERPL-MCNC: 7.9 G/DL (ref 6–8.4)
RBC # BLD AUTO: 4.11 M/UL (ref 4–5.4)
SODIUM SERPL-SCNC: 139 MMOL/L (ref 136–145)
TRIGL SERPL-MCNC: 324 MG/DL (ref 30–150)
WBC # BLD AUTO: 7.28 K/UL (ref 3.9–12.7)

## 2023-07-17 PROCEDURE — 36415 COLL VENOUS BLD VENIPUNCTURE: CPT | Mod: HCNC,PO | Performed by: FAMILY MEDICINE

## 2023-07-17 PROCEDURE — 1101F PT FALLS ASSESS-DOCD LE1/YR: CPT | Mod: HCNC,CPTII,S$GLB, | Performed by: FAMILY MEDICINE

## 2023-07-17 PROCEDURE — 3288F FALL RISK ASSESSMENT DOCD: CPT | Mod: HCNC,CPTII,S$GLB, | Performed by: FAMILY MEDICINE

## 2023-07-17 PROCEDURE — 90677 PNEUMOCOCCAL CONJUGATE VACCINE 20-VALENT: ICD-10-PCS | Mod: HCNC,S$GLB,, | Performed by: FAMILY MEDICINE

## 2023-07-17 PROCEDURE — 1125F PR PAIN SEVERITY QUANTIFIED, PAIN PRESENT: ICD-10-PCS | Mod: HCNC,CPTII,S$GLB, | Performed by: FAMILY MEDICINE

## 2023-07-17 PROCEDURE — 85025 COMPLETE CBC W/AUTO DIFF WBC: CPT | Mod: HCNC | Performed by: FAMILY MEDICINE

## 2023-07-17 PROCEDURE — 80061 LIPID PANEL: CPT | Mod: HCNC | Performed by: FAMILY MEDICINE

## 2023-07-17 PROCEDURE — 3074F SYST BP LT 130 MM HG: CPT | Mod: HCNC,CPTII,S$GLB, | Performed by: FAMILY MEDICINE

## 2023-07-17 PROCEDURE — 99397 PR PREVENTIVE VISIT,EST,65 & OVER: ICD-10-PCS | Mod: 25,HCNC,S$GLB, | Performed by: FAMILY MEDICINE

## 2023-07-17 PROCEDURE — 3008F BODY MASS INDEX DOCD: CPT | Mod: HCNC,CPTII,S$GLB, | Performed by: FAMILY MEDICINE

## 2023-07-17 PROCEDURE — 3078F PR MOST RECENT DIASTOLIC BLOOD PRESSURE < 80 MM HG: ICD-10-PCS | Mod: HCNC,CPTII,S$GLB, | Performed by: FAMILY MEDICINE

## 2023-07-17 PROCEDURE — 1159F PR MEDICATION LIST DOCUMENTED IN MEDICAL RECORD: ICD-10-PCS | Mod: HCNC,CPTII,S$GLB, | Performed by: FAMILY MEDICINE

## 2023-07-17 PROCEDURE — 1101F PR PT FALLS ASSESS DOC 0-1 FALLS W/OUT INJ PAST YR: ICD-10-PCS | Mod: HCNC,CPTII,S$GLB, | Performed by: FAMILY MEDICINE

## 2023-07-17 PROCEDURE — 99397 PER PM REEVAL EST PAT 65+ YR: CPT | Mod: 25,HCNC,S$GLB, | Performed by: FAMILY MEDICINE

## 2023-07-17 PROCEDURE — 3074F PR MOST RECENT SYSTOLIC BLOOD PRESSURE < 130 MM HG: ICD-10-PCS | Mod: HCNC,CPTII,S$GLB, | Performed by: FAMILY MEDICINE

## 2023-07-17 PROCEDURE — G0009 PNEUMOCOCCAL CONJUGATE VACCINE 20-VALENT: ICD-10-PCS | Mod: HCNC,S$GLB,, | Performed by: FAMILY MEDICINE

## 2023-07-17 PROCEDURE — 1125F AMNT PAIN NOTED PAIN PRSNT: CPT | Mod: HCNC,CPTII,S$GLB, | Performed by: FAMILY MEDICINE

## 2023-07-17 PROCEDURE — 1159F MED LIST DOCD IN RCRD: CPT | Mod: HCNC,CPTII,S$GLB, | Performed by: FAMILY MEDICINE

## 2023-07-17 PROCEDURE — G0009 ADMIN PNEUMOCOCCAL VACCINE: HCPCS | Mod: HCNC,S$GLB,, | Performed by: FAMILY MEDICINE

## 2023-07-17 PROCEDURE — 99999 PR PBB SHADOW E&M-EST. PATIENT-LVL V: ICD-10-PCS | Mod: PBBFAC,HCNC,, | Performed by: FAMILY MEDICINE

## 2023-07-17 PROCEDURE — 90677 PCV20 VACCINE IM: CPT | Mod: HCNC,S$GLB,, | Performed by: FAMILY MEDICINE

## 2023-07-17 PROCEDURE — 3288F PR FALLS RISK ASSESSMENT DOCUMENTED: ICD-10-PCS | Mod: HCNC,CPTII,S$GLB, | Performed by: FAMILY MEDICINE

## 2023-07-17 PROCEDURE — 80053 COMPREHEN METABOLIC PANEL: CPT | Mod: HCNC | Performed by: FAMILY MEDICINE

## 2023-07-17 PROCEDURE — 83036 HEMOGLOBIN GLYCOSYLATED A1C: CPT | Mod: DBM,HCNC | Performed by: FAMILY MEDICINE

## 2023-07-17 PROCEDURE — 99999 PR PBB SHADOW E&M-EST. PATIENT-LVL V: CPT | Mod: PBBFAC,HCNC,, | Performed by: FAMILY MEDICINE

## 2023-07-17 PROCEDURE — 3078F DIAST BP <80 MM HG: CPT | Mod: HCNC,CPTII,S$GLB, | Performed by: FAMILY MEDICINE

## 2023-07-17 PROCEDURE — 4010F PR ACE/ARB THEARPY RXD/TAKEN: ICD-10-PCS | Mod: HCNC,CPTII,S$GLB, | Performed by: FAMILY MEDICINE

## 2023-07-17 PROCEDURE — 3008F PR BODY MASS INDEX (BMI) DOCUMENTED: ICD-10-PCS | Mod: HCNC,CPTII,S$GLB, | Performed by: FAMILY MEDICINE

## 2023-07-17 PROCEDURE — 4010F ACE/ARB THERAPY RXD/TAKEN: CPT | Mod: HCNC,CPTII,S$GLB, | Performed by: FAMILY MEDICINE

## 2023-07-17 NOTE — PROGRESS NOTES
Prevnar 20 given IM    left  deltoid, tolerated well, recommended 15 minute wait to watch for adverse reactions.

## 2023-07-17 NOTE — PROGRESS NOTES
"  Chief Complaint:    Chief Complaint   Patient presents with    Annual Exam     C/o hip pain and this morning had to call EMS she felt a dull ache in her back , when she got up she "didn't feel good" heart was racing        History of Present Illness:  Patient presents today for annual/blood-work,    Having some bilateral hip pain with walking   Has used orthotic sandals for a day which seemed to hurt it more    EMS visited this morning, says he woke up with heart racing and has some dull pain to upper L back. When she stood she didn't feel dizzy but different. Denies any CP, SOB, or numbness. EKG done by EMS, was unremarkable. BP eventually stabilized and still is stable  Having some sinus pain with congestion and wheezing. Uses albuterol inhaler      Due for lung cancer screening  Due for mammogram  Due for pneumonia vaccination      ROS:  Review of Systems   Constitutional:  Negative for appetite change, chills and fever.   HENT:  Positive for congestion and sinus pain. Negative for ear pain, postnasal drip, rhinorrhea and sinus pressure.    Eyes:  Negative for pain.   Respiratory:  Positive for wheezing. Negative for cough, chest tightness and shortness of breath.    Cardiovascular:  Negative for chest pain and palpitations.   Gastrointestinal:  Negative for abdominal pain, blood in stool, constipation, diarrhea and nausea.   Genitourinary:  Negative for difficulty urinating, dysuria, flank pain and hematuria.   Musculoskeletal:  Positive for arthralgias, back pain and myalgias.   Skin:  Negative for pallor and wound.   Neurological:  Negative for dizziness, tremors, speech difficulty, light-headedness and headaches.   Psychiatric/Behavioral:  Negative for behavioral problems, dysphoric mood and sleep disturbance. The patient is not nervous/anxious.    All other systems reviewed and are negative.    Past Medical History:   Diagnosis Date    Arthritis     Hyperlipidemia     Hypertension     Osteoporosis  " "      Social History:  Social History     Socioeconomic History    Marital status:    Tobacco Use    Smoking status: Some Days     Packs/day: 0.50     Years: 45.00     Pack years: 22.50     Types: Cigarettes    Smokeless tobacco: Never   Substance and Sexual Activity    Alcohol use: No     Alcohol/week: 0.0 standard drinks    Drug use: No    Sexual activity: Never     Partners: Male     Birth control/protection: None       Family History:   family history includes Breast cancer in her paternal aunt.    Health Maintenance   Topic Date Due    Lipid Panel  08/29/2020    LDCT Lung Screen  12/06/2022    Mammogram  06/24/2023    DEXA Scan  09/22/2025    TETANUS VACCINE  02/26/2028    Hepatitis C Screening  Completed       Physical Exam:    Vital Signs  Pulse: 78  SpO2: 100 %  BP: 128/78  BP Location: Left leg  Patient Position: Sitting  Pain Score:   3  Pain Loc: Hip  Height and Weight  Height: 5' 4" (162.6 cm)  Weight: 61.1 kg (134 lb 11.2 oz)  BSA (Calculated - sq m): 1.66 sq meters  BMI (Calculated): 23.1  Weight in (lb) to have BMI = 25: 145.3]    Body mass index is 23.12 kg/m².    Physical Exam  Vitals and nursing note reviewed.   Constitutional:       Appearance: Normal appearance. She is not toxic-appearing.   HENT:      Head: Normocephalic and atraumatic.      Right Ear: Tympanic membrane normal.      Left Ear: Tympanic membrane normal.   Eyes:      Extraocular Movements: Extraocular movements intact.      Pupils: Pupils are equal, round, and reactive to light.   Cardiovascular:      Rate and Rhythm: Normal rate and regular rhythm.      Heart sounds: Normal heart sounds.   Pulmonary:      Effort: Pulmonary effort is normal.      Breath sounds: Normal breath sounds. No wheezing, rhonchi or rales.   Abdominal:      General: Bowel sounds are normal. There is no distension.      Palpations: Abdomen is soft.      Tenderness: There is no abdominal tenderness.   Musculoskeletal:         General: Normal range of " motion.      Cervical back: Normal range of motion.        Legs:       Comments: SI joint tenderness   Skin:     General: Skin is warm and dry.      Capillary Refill: Capillary refill takes less than 2 seconds.   Neurological:      General: No focal deficit present.      Mental Status: She is alert and oriented to person, place, and time.   Psychiatric:         Mood and Affect: Mood normal.         Behavior: Behavior normal.         Judgment: Judgment normal.         Assessment:      ICD-10-CM ICD-9-CM   1. Well adult exam  Z00.00 V70.0   2. Chronic SI joint pain  M53.3 724.6    G89.29 338.29   3. Smoking  F17.200 305.1   4. Primary hypertension  I10 401.9   5. Mixed hyperlipidemia  E78.2 272.2   6. Encounter for screening for lung cancer  Z12.2 V76.0   7. Breast cancer screening by mammogram  Z12.31 V76.12   8. Smoker  F17.200 305.1       Plan:       Refer to pain clinic for SI joint pain.  Order low dose CT lung scan for cancer screening.    Schedule mammogram.   Get pneumococcal conjugate vaccine today. In office  See labs below    Orders Placed This Encounter   Procedures    Mammo Digital Screening Bilat    CT Chest Lung Screening Low Dose    (In Office Administered) Pneumococcal Conjugate Vaccine (20 Valent) (IM)    CBC Auto Differential    Comprehensive Metabolic Panel    Hemoglobin A1C    Lipid Panel    Ambulatory referral/consult to Pain Clinic     Current Outpatient Medications   Medication Sig Dispense Refill    cholecalciferol, vitamin D3, (VITAMIN D3 ORAL) Take by mouth.      citalopram (CELEXA) 20 MG tablet TAKE 1 TABLET BY MOUTH EVERY DAY 90 tablet 2    diclofenac sodium (VOLTAREN) 1 % Gel Apply 2 g topically once daily. 1 Tube 2    losartan (COZAAR) 100 MG tablet TAKE ONE TABLET BY MOUTH DAILY 90 tablet 0    metoprolol succinate (TOPROL-XL) 25 MG 24 hr tablet TAKE 1/2 TABLET BY MOUTH EVERY DAY 45 tablet 3    potassium chloride (MICRO-K) 10 MEQ CpSR TAKE TWO CAPSULES BY MOUTH EVERY  capsule 0     triamcinolone acetonide 0.1% (KENALOG) 0.1 % cream Apply topically 2 (two) times daily. for 10 days 1 each 1    triamterene-hydrochlorothiazide 37.5-25 mg (MAXZIDE-25) 37.5-25 mg per tablet TAKE 1/2 TABLET BY MOUTH EVERY DAY 45 tablet 0    TURMERIC ORAL Take by mouth.      alendronate (FOSAMAX) 70 MG tablet TAKE ONE TABLET BY MOUTH EVERY 7 DAYS (Patient not taking: Reported on 7/17/2023) 12 tablet 3     No current facility-administered medications for this visit.       Medications Discontinued During This Encounter   Medication Reason    methylPREDNISolone (MEDROL DOSEPACK) 4 mg tablet Patient no longer taking    diclofenac sodium 100 mg 24 hr tablet Patient no longer taking    evolocumab (REPATHA SURECLICK) 140 mg/mL PnIj Patient no longer taking    meloxicam (MOBIC) 7.5 MG tablet Patient no longer taking    potassium 99 mg Tab Patient no longer taking    predniSONE (DELTASONE) 20 MG tablet Patient no longer taking    methocarbamoL (ROBAXIN) 750 MG Tab Patient no longer taking       Follow up in about 1 month (around 8/17/2023).      Alonso Cortez MD  Scribe Attestation:   I, Justice Ji, am scribing for, and in the presence of, Dr.Arif Cortez I performed the above scribed service and the documentation accurately describes the services I performed. I attest to the accuracy of the note.    I, Dr. Alonso Cortez, reviewed documentation as scribed above. I performed the services described in this documentation.  I agree that the record reflects my personal performance and is accurate and complete. Alonso Cortez MD.  07/17/2023

## 2023-08-07 RX ORDER — POTASSIUM CHLORIDE 750 MG/1
CAPSULE, EXTENDED RELEASE ORAL
Qty: 180 CAPSULE | Refills: 3 | Status: SHIPPED | OUTPATIENT
Start: 2023-08-07

## 2023-08-17 ENCOUNTER — OFFICE VISIT (OUTPATIENT)
Dept: FAMILY MEDICINE | Facility: CLINIC | Age: 68
End: 2023-08-17
Payer: MEDICARE

## 2023-08-17 ENCOUNTER — SPECIALTY PHARMACY (OUTPATIENT)
Dept: PHARMACY | Facility: CLINIC | Age: 68
End: 2023-08-17
Payer: MEDICARE

## 2023-08-17 VITALS
BODY MASS INDEX: 23.04 KG/M2 | SYSTOLIC BLOOD PRESSURE: 132 MMHG | OXYGEN SATURATION: 99 % | HEART RATE: 73 BPM | HEIGHT: 64 IN | DIASTOLIC BLOOD PRESSURE: 63 MMHG | WEIGHT: 134.94 LBS

## 2023-08-17 DIAGNOSIS — M79.10 MYALGIA DUE TO HMG COA REDUCTASE INHIBITOR: ICD-10-CM

## 2023-08-17 DIAGNOSIS — T46.6X5A MYALGIA DUE TO HMG COA REDUCTASE INHIBITOR: ICD-10-CM

## 2023-08-17 DIAGNOSIS — E78.2 MIXED HYPERLIPIDEMIA: Primary | ICD-10-CM

## 2023-08-17 DIAGNOSIS — I10 PRIMARY HYPERTENSION: ICD-10-CM

## 2023-08-17 PROCEDURE — 3078F PR MOST RECENT DIASTOLIC BLOOD PRESSURE < 80 MM HG: ICD-10-PCS | Mod: CPTII,S$GLB,, | Performed by: FAMILY MEDICINE

## 2023-08-17 PROCEDURE — 1126F PR PAIN SEVERITY QUANTIFIED, NO PAIN PRESENT: ICD-10-PCS | Mod: CPTII,S$GLB,, | Performed by: FAMILY MEDICINE

## 2023-08-17 PROCEDURE — 1101F PT FALLS ASSESS-DOCD LE1/YR: CPT | Mod: CPTII,S$GLB,, | Performed by: FAMILY MEDICINE

## 2023-08-17 PROCEDURE — 1126F AMNT PAIN NOTED NONE PRSNT: CPT | Mod: CPTII,S$GLB,, | Performed by: FAMILY MEDICINE

## 2023-08-17 PROCEDURE — 99213 PR OFFICE/OUTPT VISIT, EST, LEVL III, 20-29 MIN: ICD-10-PCS | Mod: S$GLB,,, | Performed by: FAMILY MEDICINE

## 2023-08-17 PROCEDURE — 3288F PR FALLS RISK ASSESSMENT DOCUMENTED: ICD-10-PCS | Mod: CPTII,S$GLB,, | Performed by: FAMILY MEDICINE

## 2023-08-17 PROCEDURE — 3044F PR MOST RECENT HEMOGLOBIN A1C LEVEL <7.0%: ICD-10-PCS | Mod: CPTII,S$GLB,, | Performed by: FAMILY MEDICINE

## 2023-08-17 PROCEDURE — 3008F PR BODY MASS INDEX (BMI) DOCUMENTED: ICD-10-PCS | Mod: CPTII,S$GLB,, | Performed by: FAMILY MEDICINE

## 2023-08-17 PROCEDURE — 1160F RVW MEDS BY RX/DR IN RCRD: CPT | Mod: CPTII,S$GLB,, | Performed by: FAMILY MEDICINE

## 2023-08-17 PROCEDURE — 3078F DIAST BP <80 MM HG: CPT | Mod: CPTII,S$GLB,, | Performed by: FAMILY MEDICINE

## 2023-08-17 PROCEDURE — 3288F FALL RISK ASSESSMENT DOCD: CPT | Mod: CPTII,S$GLB,, | Performed by: FAMILY MEDICINE

## 2023-08-17 PROCEDURE — 1159F MED LIST DOCD IN RCRD: CPT | Mod: CPTII,S$GLB,, | Performed by: FAMILY MEDICINE

## 2023-08-17 PROCEDURE — 1160F PR REVIEW ALL MEDS BY PRESCRIBER/CLIN PHARMACIST DOCUMENTED: ICD-10-PCS | Mod: CPTII,S$GLB,, | Performed by: FAMILY MEDICINE

## 2023-08-17 PROCEDURE — 3044F HG A1C LEVEL LT 7.0%: CPT | Mod: CPTII,S$GLB,, | Performed by: FAMILY MEDICINE

## 2023-08-17 PROCEDURE — 3008F BODY MASS INDEX DOCD: CPT | Mod: CPTII,S$GLB,, | Performed by: FAMILY MEDICINE

## 2023-08-17 PROCEDURE — 99999 PR PBB SHADOW E&M-EST. PATIENT-LVL III: ICD-10-PCS | Mod: PBBFAC,,, | Performed by: FAMILY MEDICINE

## 2023-08-17 PROCEDURE — 3075F SYST BP GE 130 - 139MM HG: CPT | Mod: CPTII,S$GLB,, | Performed by: FAMILY MEDICINE

## 2023-08-17 PROCEDURE — 3075F PR MOST RECENT SYSTOLIC BLOOD PRESS GE 130-139MM HG: ICD-10-PCS | Mod: CPTII,S$GLB,, | Performed by: FAMILY MEDICINE

## 2023-08-17 PROCEDURE — 4010F PR ACE/ARB THEARPY RXD/TAKEN: ICD-10-PCS | Mod: CPTII,S$GLB,, | Performed by: FAMILY MEDICINE

## 2023-08-17 PROCEDURE — 4010F ACE/ARB THERAPY RXD/TAKEN: CPT | Mod: CPTII,S$GLB,, | Performed by: FAMILY MEDICINE

## 2023-08-17 PROCEDURE — 1159F PR MEDICATION LIST DOCUMENTED IN MEDICAL RECORD: ICD-10-PCS | Mod: CPTII,S$GLB,, | Performed by: FAMILY MEDICINE

## 2023-08-17 PROCEDURE — 99213 OFFICE O/P EST LOW 20 MIN: CPT | Mod: S$GLB,,, | Performed by: FAMILY MEDICINE

## 2023-08-17 PROCEDURE — 99999 PR PBB SHADOW E&M-EST. PATIENT-LVL III: CPT | Mod: PBBFAC,,, | Performed by: FAMILY MEDICINE

## 2023-08-17 PROCEDURE — 1101F PR PT FALLS ASSESS DOC 0-1 FALLS W/OUT INJ PAST YR: ICD-10-PCS | Mod: CPTII,S$GLB,, | Performed by: FAMILY MEDICINE

## 2023-08-17 RX ORDER — EVOLOCUMAB 140 MG/ML
140 INJECTION, SOLUTION SUBCUTANEOUS
Qty: 2 ML | Refills: 2 | Status: ACTIVE | OUTPATIENT
Start: 2023-08-17 | End: 2023-11-15 | Stop reason: SDUPTHER

## 2023-08-17 NOTE — PROGRESS NOTES
Chief Complaint:    Chief Complaint   Patient presents with    Follow-up     Discuss lab results       History of Present Illness:  Patient presents today for 1 month follow-up,    Doing well, no complaints.  BP good today  Cholesterol still high at 269. On no medications for it, Repatha was not approved by previous insurance. Wants to try again with new insurance.  All other labs reviewed and stable.    Due for shingles vaccination.    ROS:  Review of Systems   Constitutional:  Negative for appetite change, chills and fever.   HENT:  Negative for congestion, ear pain, postnasal drip, rhinorrhea, sinus pressure and sinus pain.    Eyes:  Negative for pain.   Respiratory:  Negative for cough, chest tightness and shortness of breath.    Cardiovascular:  Negative for chest pain and palpitations.   Gastrointestinal:  Negative for abdominal pain, blood in stool, constipation, diarrhea and nausea.   Genitourinary:  Negative for difficulty urinating, dysuria, flank pain and hematuria.   Musculoskeletal:  Negative for arthralgias, back pain and myalgias.   Skin:  Negative for pallor and wound.   Neurological:  Negative for dizziness, tremors, speech difficulty, light-headedness and headaches.   Psychiatric/Behavioral:  Negative for behavioral problems, dysphoric mood and sleep disturbance.    All other systems reviewed and are negative.      Past Medical History:   Diagnosis Date    Arthritis     Hyperlipidemia     Hypertension     Osteoporosis        Social History:  Social History     Socioeconomic History    Marital status:    Tobacco Use    Smoking status: Some Days     Current packs/day: 0.50     Average packs/day: 0.5 packs/day for 45.0 years (22.5 ttl pk-yrs)     Types: Cigarettes    Smokeless tobacco: Never   Substance and Sexual Activity    Alcohol use: No     Alcohol/week: 0.0 standard drinks of alcohol    Drug use: No    Sexual activity: Never     Partners: Male     Birth control/protection: None  "    Social Determinants of Health     Financial Resource Strain: Medium Risk (1/1/2020)    Overall Financial Resource Strain (CARDIA)     Difficulty of Paying Living Expenses: Somewhat hard   Food Insecurity: No Food Insecurity (1/1/2020)    Hunger Vital Sign     Worried About Running Out of Food in the Last Year: Never true     Ran Out of Food in the Last Year: Never true   Transportation Needs: No Transportation Needs (1/1/2020)    PRAPARE - Transportation     Lack of Transportation (Medical): No     Lack of Transportation (Non-Medical): No   Physical Activity: Inactive (1/1/2020)    Exercise Vital Sign     Days of Exercise per Week: 0 days     Minutes of Exercise per Session: 0 min   Stress: No Stress Concern Present (1/1/2020)    [employer] [place] [employer] - Occupational Stress Questionnaire     Feeling of Stress : Only a little   Social Connections: Unknown (1/1/2020)    Social Connection and Isolation Panel [NHANES]     Frequency of Communication with Friends and Family: More than three times a week     Frequency of Social Gatherings with Friends and Family: Once a week     Active Member of Clubs or Organizations: No     Attends Club or Organization Meetings: Never     Marital Status: Living with partner       Family History:   family history includes Breast cancer in her paternal aunt.    Health Maintenance   Topic Date Due    Shingles Vaccine (1 of 2) Never done    LDCT Lung Screen  12/06/2022    Mammogram  06/24/2023    Lipid Panel  07/17/2024    Colorectal Cancer Screening  12/09/2024    DEXA Scan  09/22/2025    TETANUS VACCINE  02/26/2028    Hepatitis C Screening  Completed       Physical Exam:    Vital Signs  Pulse: 73  SpO2: 99 %  BP: 132/63  BP Location: Left arm  Patient Position: Sitting  Pain Score: 0-No pain  Height and Weight  Height: 5' 4" (162.6 cm)  Weight: 61.2 kg (134 lb 14.7 oz)  BSA (Calculated - sq m): 1.66 sq meters  BMI (Calculated): 23.1  Weight in (lb) to have BMI = 25: " 145.3]    Body mass index is 23.16 kg/m².    Physical Exam  Vitals and nursing note reviewed.   Constitutional:       Appearance: Normal appearance. She is not toxic-appearing.   HENT:      Head: Normocephalic and atraumatic.      Right Ear: Tympanic membrane normal.      Left Ear: Tympanic membrane normal.   Eyes:      Extraocular Movements: Extraocular movements intact.      Pupils: Pupils are equal, round, and reactive to light.   Cardiovascular:      Rate and Rhythm: Normal rate and regular rhythm.      Heart sounds: Normal heart sounds.   Pulmonary:      Effort: Pulmonary effort is normal.      Breath sounds: Normal breath sounds. No wheezing, rhonchi or rales.   Abdominal:      General: Bowel sounds are normal. There is no distension.      Palpations: Abdomen is soft.      Tenderness: There is no abdominal tenderness.   Musculoskeletal:         General: Normal range of motion.      Cervical back: Normal range of motion.      Lumbar back: No tenderness.   Skin:     General: Skin is warm and dry.      Capillary Refill: Capillary refill takes less than 2 seconds.   Neurological:      General: No focal deficit present.      Mental Status: She is alert and oriented to person, place, and time.   Psychiatric:         Mood and Affect: Mood normal.         Behavior: Behavior normal.         Judgment: Judgment normal.           Assessment:      ICD-10-CM ICD-9-CM   1. Mixed hyperlipidemia  E78.2 272.2   2. 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 Primary hypertension  I10 401.9       Plan:     Try to eat more natural and whole-some foods. Avoid processed foods like breads, pastas, cookies, and sweets. Eat more vegetables, salads, fruits, and certain meats.  Start Repatha 140 mg, inject into skin every 14 days.  Will recheck cholesterol in 6 weeks after taking Repatha to see if any improvement.  Hypertension stable.  Get shingles vaccination at your pharmacy.   See labs  below  Follow-up 6 months  Orders Placed This Encounter   Procedures    Lipid Panel    Comprehensive Metabolic Panel     Current Outpatient Medications   Medication Sig Dispense Refill    alendronate (FOSAMAX) 70 MG tablet TAKE ONE TABLET BY MOUTH EVERY 7 DAYS 12 tablet 3    cholecalciferol, vitamin D3, (VITAMIN D3 ORAL) Take by mouth.      citalopram (CELEXA) 20 MG tablet TAKE 1 TABLET BY MOUTH EVERY DAY 90 tablet 2    diclofenac sodium (VOLTAREN) 1 % Gel Apply 2 g topically once daily. 1 Tube 2    losartan (COZAAR) 100 MG tablet TAKE ONE TABLET BY MOUTH DAILY 90 tablet 0    metoprolol succinate (TOPROL-XL) 25 MG 24 hr tablet TAKE 1/2 TABLET BY MOUTH EVERY DAY 45 tablet 3    potassium chloride (MICRO-K) 10 MEQ CpSR TAKE TWO CAPSULES BY MOUTH EVERY  capsule 3    triamterene-hydrochlorothiazide 37.5-25 mg (MAXZIDE-25) 37.5-25 mg per tablet TAKE 1/2 TABLET BY MOUTH EVERY DAY 45 tablet 0    TURMERIC ORAL Take by mouth.      evolocumab (REPATHA SURECLICK) 140 mg/mL PnIj Inject 1 mL (140 mg total) into the skin every 14 (fourteen) days. 2 mL 2    triamcinolone acetonide 0.1% (KENALOG) 0.1 % cream Apply topically 2 (two) times daily. for 10 days 1 each 1     No current facility-administered medications for this visit.       There are no discontinued medications.      Follow up in about 6 months (around 2/17/2024).      Alonso Cortez MD  Scribe Attestation:   I, Justice Ji, am scribing for, and in the presence of, Dr.Arif Cortez I performed the above scribed service and the documentation accurately describes the services I performed. I attest to the accuracy of the note.    I, Dr. Alonso Cortez, reviewed documentation as scribed above. I performed the services described in this documentation.  I agree that the record reflects my personal performance and is accurate and complete. Alonso Cortez MD.  08/17/2023

## 2023-08-17 NOTE — TELEPHONE ENCOUNTER
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 female, who is followed by the specialty pharmacy service for management and education.    Recent Encounters       Date Type Provider Description    08/17/2023 Specialty Pharmacy Leia Patricia, Jada Initial Clinical Assessment    08/17/2023 Specialty Pharmacy Leia Patricia, PharmD Referral Authorization    12/02/2021 Specialty Pharmacy Angela Solano PharmD Referral Authorization            Current Outpatient Medications   Medication Sig    alendronate (FOSAMAX) 70 MG tablet TAKE ONE TABLET BY MOUTH EVERY 7 DAYS    cholecalciferol, vitamin D3, (VITAMIN D3 ORAL) Take by mouth.    citalopram (CELEXA) 20 MG tablet TAKE 1 TABLET BY MOUTH EVERY DAY    diclofenac sodium (VOLTAREN) 1 % Gel Apply 2 g topically once daily.    evolocumab (REPATHA SURECLICK) 140 mg/mL PnIj Inject 1 mL (140 mg total) into the skin every 14 (fourteen) days.    losartan (COZAAR) 100 MG tablet TAKE ONE TABLET BY MOUTH DAILY    metoprolol succinate (TOPROL-XL) 25 MG 24 hr tablet TAKE 1/2 TABLET BY MOUTH EVERY DAY    potassium chloride (MICRO-K) 10 MEQ CpSR TAKE TWO CAPSULES BY MOUTH EVERY DAY    triamcinolone acetonide 0.1% (KENALOG) 0.1 % cream Apply topically 2 (two) times daily. for 10 days    triamterene-hydrochlorothiazide 37.5-25 mg (MAXZIDE-25) 37.5-25 mg per tablet TAKE 1/2 TABLET BY MOUTH EVERY DAY    TURMERIC ORAL Take by mouth.   Last reviewed on 8/17/2023 10:06 AM by Alonso Cortez MD    Review of patient's allergies indicates:   Allergen Reactions    Statins-hmg-coa reductase inhibitors      Leg cramps and pain   "  Last reviewed on  8/17/2023 10:06 AM by Alonso Cortez          Assessment Questions - Documented Responses      Flowsheet Row Most Recent Value   Assessment    Goals of Therapy Status Discussed (new start)   Status of the patients ability to self-administer: Is Able   All education points have been covered with patient? Yes, supplemental printed education provided   Assesment completed? Yes   Plan Therapy being initiated   Do you need to open a clinical intervention (i-vent)? No   Do you want to schedule first shipment? Yes   Medication #1 Assessment Info    Patient status New medication   Is this medication appropriate for the patient? Yes   Is this medication effective? Not yet started          Refill Questions - Documented Responses      Flowsheet Row Most Recent Value   Refill Screening Questions    When does the patient need to receive the medication? 08/21/23   Refill Delivery Questions    How will the patient receive the medication? [facility]   When does the patient need to receive the medication? 08/21/23   Shipping Address Home   Address in [facility] confirmed and updated if neccessary? Yes   Expected Copay ($) 0   Is the patient able to afford the medication copay? Yes   Payment Method zero copay   Days supply of Refill 28   Supplies needed? No supplies needed   Refill activity completed? Yes   Refill activity plan Refill scheduled   Shipment/Pickup Date: 08/21/23            Objective    She has a past medical history of Arthritis, Hyperlipidemia, Hypertension, and Osteoporosis.    Tried/failed medications: statins    BP Readings from Last 4 Encounters:   08/17/23 132/63   07/17/23 128/78   02/09/23 130/70   10/19/22 126/74     Ht Readings from Last 4 Encounters:   08/17/23 5' 4" (1.626 m)   07/17/23 5' 4" (1.626 m)   02/09/23 5' 4" (1.626 m)   10/19/22 5' 4" (1.626 m)     Wt Readings from Last 4 Encounters:   08/17/23 61.2 kg (134 lb 14.7 oz)   07/17/23 61.1 kg (134 lb 11.2 oz)   02/09/23 60.5 kg " (133 lb 4.3 oz)   10/19/22 61.7 kg (136 lb 2.1 oz)       The goals of prescribed drug therapy management include:  Supporting patient to meet the prescriber's medical treatment objectives  Improving or maintaining quality of life  Maintaining optimal therapy adherence  Minimizing and managing side effects      Goals of Therapy Status: Discussed (new start)    Assessment/Plan  Patient plans to start therapy on 08/21/23      Indication, dosage, appropriateness, effectiveness, safety and convenience of her specialty medication(s) were reviewed today.     Patient Education   Patient received education on the following:   Expectations and possible outcomes of therapy  Proper use, timely administration, and missed dose management  Duration of therapy  Side effects, including prevention, minimization, and management  Contraindications and safety precautions  New or changed medications, including prescribe and over the counter medications and supplements  Reviews recommended vaccinations, as appropriate  Storage, safe handling, and disposal    Pt expressed understanding    Tasks added this encounter   No tasks added.   Tasks due within next 3 months   No tasks due.     Leia Patricia, PharmD  [facility]  [address] [facility] [address]  Phone: [phone]  Fax: [phone]

## 2023-08-24 ENCOUNTER — CLINICAL SUPPORT (OUTPATIENT)
Dept: FAMILY MEDICINE | Facility: CLINIC | Age: 68
End: 2023-08-24
Payer: MEDICARE

## 2023-08-24 DIAGNOSIS — Z71.89 ENCOUNTER FOR EDUCATION ABOUT INJECTION: Primary | ICD-10-CM

## 2023-08-24 NOTE — PROGRESS NOTES
Came in today to learn how to administer repatha, we walked through the steps , she voiced understanding

## 2023-09-11 ENCOUNTER — SPECIALTY PHARMACY (OUTPATIENT)
Dept: PHARMACY | Facility: CLINIC | Age: 68
End: 2023-09-11
Payer: MEDICARE

## 2023-09-11 NOTE — TELEPHONE ENCOUNTER
Specialty Pharmacy - Refill Coordination    Specialty Medication Orders Linked to Encounter      Flowsheet Row Most Recent Value   Medication #1 evolocumab (REPATHA SURECLICK) 140 mg/mL PnIj (Order#[id number], Rx#[id number])            Refill Questions - Documented Responses      Flowsheet Row Most Recent Value   Patient Availability and HIPAA Verification    Does patient want to proceed with activity? Yes   HIPAA/medical authority confirmed? Yes   Relationship to patient of person spoken to? Self   Refill Screening Questions    When does the patient need to receive the medication? 09/21/23   Refill Delivery Questions    How will the patient receive the medication? [facility]   When does the patient need to receive the medication? 09/21/23   Shipping Address Home   Address in [facility] confirmed and updated if neccessary? Yes   Expected Copay ($) 0   Is the patient able to afford the medication copay? Yes   Payment Method zero copay   Days supply of Refill 28   Supplies needed? Alcohol Swabs   Refill activity completed? Yes   Refill activity plan Refill scheduled   Shipment/Pickup Date: 09/18/23            Current Outpatient Medications   Medication Sig    alendronate (FOSAMAX) 70 MG tablet TAKE ONE TABLET BY MOUTH EVERY 7 DAYS    cholecalciferol, vitamin D3, (VITAMIN D3 ORAL) Take by mouth.    citalopram (CELEXA) 20 MG tablet TAKE 1 TABLET BY MOUTH EVERY DAY    diclofenac sodium (VOLTAREN) 1 % Gel Apply 2 g topically once daily.    evolocumab (REPATHA SURECLICK) 140 mg/mL PnIj Inject 1 mL (140 mg total) into the skin every 14 (fourteen) days.    losartan (COZAAR) 100 MG tablet TAKE ONE TABLET BY MOUTH DAILY    metoprolol succinate (TOPROL-XL) 25 MG 24 hr tablet TAKE 1/2 TABLET BY MOUTH EVERY DAY    potassium chloride (MICRO-K) 10 MEQ CpSR TAKE TWO CAPSULES BY MOUTH EVERY DAY    triamcinolone acetonide 0.1% (KENALOG) 0.1 % cream Apply topically 2 (two) times daily. for 10 days    triamterene-hydrochlorothiazide  37.5-25 mg (MAXZIDE-25) 37.5-25 mg per tablet TAKE 1/2 TABLET BY MOUTH EVERY DAY    TURMERIC ORAL Take by mouth.   Last reviewed on 8/17/2023 10:06 AM by Alonso Cortez MD    Review of patient's allergies indicates:   Allergen Reactions    Statins-hmg-coa reductase inhibitors      Leg cramps and pain     Last reviewed on  8/17/2023 10:06 AM by Alonso Cortez      Tasks added this encounter   No tasks added.   Tasks due within next 3 months   No tasks due.     Kathleen Randall, PharmD  [facility]  [address] [place] [id number]  Phone: [phone]  Fax: [phone]

## 2023-10-04 ENCOUNTER — OFFICE VISIT (OUTPATIENT)
Dept: FAMILY MEDICINE | Facility: CLINIC | Age: 68
End: 2023-10-04
Payer: MEDICARE

## 2023-10-04 ENCOUNTER — LAB VISIT (OUTPATIENT)
Dept: LAB | Facility: HOSPITAL | Age: 68
End: 2023-10-04
Attending: FAMILY MEDICINE
Payer: MEDICARE

## 2023-10-04 VITALS
BODY MASS INDEX: 22.77 KG/M2 | HEIGHT: 64 IN | DIASTOLIC BLOOD PRESSURE: 80 MMHG | WEIGHT: 133.38 LBS | OXYGEN SATURATION: 100 % | HEART RATE: 80 BPM | SYSTOLIC BLOOD PRESSURE: 100 MMHG

## 2023-10-04 DIAGNOSIS — I10 PRIMARY HYPERTENSION: ICD-10-CM

## 2023-10-04 DIAGNOSIS — E78.2 MIXED HYPERLIPIDEMIA: ICD-10-CM

## 2023-10-04 DIAGNOSIS — E78.2 MIXED HYPERLIPIDEMIA: Primary | ICD-10-CM

## 2023-10-04 LAB
ALBUMIN SERPL BCP-MCNC: 4 G/DL (ref 3.5–5.2)
ALP SERPL-CCNC: 73 U/L (ref 55–135)
ALT SERPL W/O P-5'-P-CCNC: 15 U/L (ref 10–44)
ANION GAP SERPL CALC-SCNC: 9 MMOL/L (ref 8–16)
AST SERPL-CCNC: 25 U/L (ref 10–40)
BILIRUB SERPL-MCNC: 0.4 MG/DL (ref 0.1–1)
BUN SERPL-MCNC: 15 MG/DL (ref 8–23)
CALCIUM SERPL-MCNC: 9.7 MG/DL (ref 8.7–10.5)
CHLORIDE SERPL-SCNC: 105 MMOL/L (ref 95–110)
CHOLEST SERPL-MCNC: 123 MG/DL (ref 120–199)
CHOLEST/HDLC SERPL: 2.4 {RATIO} (ref 2–5)
CO2 SERPL-SCNC: 27 MMOL/L (ref 23–29)
CREAT SERPL-MCNC: 0.8 MG/DL (ref 0.5–1.4)
EST. GFR  (NO RACE VARIABLE): >60 ML/MIN/1.73 M^2
GLUCOSE SERPL-MCNC: 92 MG/DL (ref 70–110)
HDLC SERPL-MCNC: 51 MG/DL (ref 40–75)
HDLC SERPL: 41.5 % (ref 20–50)
LDLC SERPL CALC-MCNC: 28.4 MG/DL (ref 63–159)
NONHDLC SERPL-MCNC: 72 MG/DL
POTASSIUM SERPL-SCNC: 4.2 MMOL/L (ref 3.5–5.1)
PROT SERPL-MCNC: 7.7 G/DL (ref 6–8.4)
SODIUM SERPL-SCNC: 141 MMOL/L (ref 136–145)
TRIGL SERPL-MCNC: 218 MG/DL (ref 30–150)

## 2023-10-04 PROCEDURE — 99214 PR OFFICE/OUTPT VISIT, EST, LEVL IV, 30-39 MIN: ICD-10-PCS | Mod: HCNC,S$GLB,, | Performed by: FAMILY MEDICINE

## 2023-10-04 PROCEDURE — 80061 LIPID PANEL: CPT | Mod: HCNC | Performed by: FAMILY MEDICINE

## 2023-10-04 PROCEDURE — 1159F PR MEDICATION LIST DOCUMENTED IN MEDICAL RECORD: ICD-10-PCS | Mod: HCNC,CPTII,S$GLB, | Performed by: FAMILY MEDICINE

## 2023-10-04 PROCEDURE — 1101F PT FALLS ASSESS-DOCD LE1/YR: CPT | Mod: HCNC,CPTII,S$GLB, | Performed by: FAMILY MEDICINE

## 2023-10-04 PROCEDURE — 80053 COMPREHEN METABOLIC PANEL: CPT | Mod: HCNC | Performed by: FAMILY MEDICINE

## 2023-10-04 PROCEDURE — 3074F PR MOST RECENT SYSTOLIC BLOOD PRESSURE < 130 MM HG: ICD-10-PCS | Mod: HCNC,CPTII,S$GLB, | Performed by: FAMILY MEDICINE

## 2023-10-04 PROCEDURE — 3008F BODY MASS INDEX DOCD: CPT | Mod: HCNC,CPTII,S$GLB, | Performed by: FAMILY MEDICINE

## 2023-10-04 PROCEDURE — 3288F FALL RISK ASSESSMENT DOCD: CPT | Mod: HCNC,CPTII,S$GLB, | Performed by: FAMILY MEDICINE

## 2023-10-04 PROCEDURE — 1101F PR PT FALLS ASSESS DOC 0-1 FALLS W/OUT INJ PAST YR: ICD-10-PCS | Mod: HCNC,CPTII,S$GLB, | Performed by: FAMILY MEDICINE

## 2023-10-04 PROCEDURE — 3044F HG A1C LEVEL LT 7.0%: CPT | Mod: HCNC,CPTII,S$GLB, | Performed by: FAMILY MEDICINE

## 2023-10-04 PROCEDURE — 99214 OFFICE O/P EST MOD 30 MIN: CPT | Mod: HCNC,S$GLB,, | Performed by: FAMILY MEDICINE

## 2023-10-04 PROCEDURE — 3079F DIAST BP 80-89 MM HG: CPT | Mod: HCNC,CPTII,S$GLB, | Performed by: FAMILY MEDICINE

## 2023-10-04 PROCEDURE — 3074F SYST BP LT 130 MM HG: CPT | Mod: HCNC,CPTII,S$GLB, | Performed by: FAMILY MEDICINE

## 2023-10-04 PROCEDURE — 99999 PR PBB SHADOW E&M-EST. PATIENT-LVL III: CPT | Mod: PBBFAC,HCNC,, | Performed by: FAMILY MEDICINE

## 2023-10-04 PROCEDURE — 3079F PR MOST RECENT DIASTOLIC BLOOD PRESSURE 80-89 MM HG: ICD-10-PCS | Mod: HCNC,CPTII,S$GLB, | Performed by: FAMILY MEDICINE

## 2023-10-04 PROCEDURE — 3044F PR MOST RECENT HEMOGLOBIN A1C LEVEL <7.0%: ICD-10-PCS | Mod: HCNC,CPTII,S$GLB, | Performed by: FAMILY MEDICINE

## 2023-10-04 PROCEDURE — 1159F MED LIST DOCD IN RCRD: CPT | Mod: HCNC,CPTII,S$GLB, | Performed by: FAMILY MEDICINE

## 2023-10-04 PROCEDURE — 4010F PR ACE/ARB THEARPY RXD/TAKEN: ICD-10-PCS | Mod: HCNC,CPTII,S$GLB, | Performed by: FAMILY MEDICINE

## 2023-10-04 PROCEDURE — 99999 PR PBB SHADOW E&M-EST. PATIENT-LVL III: ICD-10-PCS | Mod: PBBFAC,HCNC,, | Performed by: FAMILY MEDICINE

## 2023-10-04 PROCEDURE — 4010F ACE/ARB THERAPY RXD/TAKEN: CPT | Mod: HCNC,CPTII,S$GLB, | Performed by: FAMILY MEDICINE

## 2023-10-04 PROCEDURE — 36415 COLL VENOUS BLD VENIPUNCTURE: CPT | Mod: HCNC,PO | Performed by: FAMILY MEDICINE

## 2023-10-04 PROCEDURE — 3008F PR BODY MASS INDEX (BMI) DOCUMENTED: ICD-10-PCS | Mod: HCNC,CPTII,S$GLB, | Performed by: FAMILY MEDICINE

## 2023-10-04 PROCEDURE — 3288F PR FALLS RISK ASSESSMENT DOCUMENTED: ICD-10-PCS | Mod: HCNC,CPTII,S$GLB, | Performed by: FAMILY MEDICINE

## 2023-10-04 NOTE — PROGRESS NOTES
Chief Complaint:    No chief complaint on file.      History of Present Illness:  Patient presents today for 6 week follow-up,    Recently started Repatha to help with HLD, was supposed to have labs done before appt but they were not completed yet.    She does say that from her previous labs she noticed her triglycerides were elevated and thought it was her sugar so she cut out all sugar since and says she has lost a few lbs and her BP has improved.      Due for lung cancer screening and mammogram, had to miss last one due to sickness.       ROS:  Review of Systems   Constitutional:  Negative for appetite change, chills and fever.   HENT:  Negative for congestion, ear pain, postnasal drip, rhinorrhea, sinus pressure and sinus pain.    Eyes:  Negative for pain.   Respiratory:  Negative for cough, chest tightness and shortness of breath.    Cardiovascular:  Negative for chest pain and palpitations.   Gastrointestinal:  Negative for abdominal pain, blood in stool, constipation, diarrhea and nausea.   Genitourinary:  Negative for difficulty urinating, dysuria, flank pain and hematuria.   Musculoskeletal:  Negative for arthralgias, back pain and myalgias.   Skin:  Negative for pallor and wound.   Neurological:  Negative for dizziness, tremors, speech difficulty, light-headedness and headaches.   Psychiatric/Behavioral:  Negative for behavioral problems, dysphoric mood and sleep disturbance.    All other systems reviewed and are negative.      Past Medical History:   Diagnosis Date    Arthritis     Hyperlipidemia     Hypertension     Osteoporosis        Social History:  Social History     Socioeconomic History    Marital status:    Tobacco Use    Smoking status: Some Days     Current packs/day: 0.50     Average packs/day: 0.5 packs/day for 45.0 years (22.5 ttl pk-yrs)     Types: Cigarettes    Smokeless tobacco: Never   Substance and Sexual Activity    Alcohol use: No     Alcohol/week: 0.0 standard drinks of  "alcohol    Drug use: No    Sexual activity: Never     Partners: Male     Birth control/protection: None     Social Determinants of Health     Financial Resource Strain: Medium Risk (1/1/2020)    Overall Financial Resource Strain (CARDIA)     Difficulty of Paying Living Expenses: Somewhat hard   Food Insecurity: No Food Insecurity (1/1/2020)    Hunger Vital Sign     Worried About Running Out of Food in the Last Year: Never true     Ran Out of Food in the Last Year: Never true   Transportation Needs: No Transportation Needs (1/1/2020)    PRAPARE - Transportation     Lack of Transportation (Medical): No     Lack of Transportation (Non-Medical): No   Physical Activity: Inactive (1/1/2020)    Exercise Vital Sign     Days of Exercise per Week: 0 days     Minutes of Exercise per Session: 0 min   Stress: No Stress Concern Present (1/1/2020)    [employer] [place] [employer] - Occupational Stress Questionnaire     Feeling of Stress : Only a little   Social Connections: Unknown (1/1/2020)    Social Connection and Isolation Panel [NHANES]     Frequency of Communication with Friends and Family: More than three times a week     Frequency of Social Gatherings with Friends and Family: Once a week     Active Member of Clubs or Organizations: No     Attends Club or Organization Meetings: Never     Marital Status: Living with partner       Family History:   family history includes Breast cancer in her paternal aunt.    Health Maintenance   Topic Date Due    Shingles Vaccine (1 of 2) Never done    LDCT Lung Screen  12/06/2022    Mammogram  06/24/2023    Lipid Panel  07/17/2024    Colorectal Cancer Screening  12/09/2024    DEXA Scan  09/22/2025    TETANUS VACCINE  02/26/2028    Hepatitis C Screening  Completed       Physical Exam:    Vital Signs  Pulse: 80  SpO2: 100 %  BP: 100/80  BP Location: Right arm  Patient Position: Sitting  Height and Weight  Height: 5' 4" (162.6 cm)  Weight: 60.5 kg (133 lb 6.1 oz)  BSA (Calculated - " sq m): 1.65 sq meters  BMI (Calculated): 22.9  Weight in (lb) to have BMI = 25: 145.3]    Body mass index is 22.89 kg/m².    Physical Exam  Vitals and nursing note reviewed.   Constitutional:       Appearance: Normal appearance. She is not toxic-appearing.   HENT:      Head: Normocephalic and atraumatic.      Right Ear: Tympanic membrane normal.      Left Ear: Tympanic membrane normal.   Eyes:      Extraocular Movements: Extraocular movements intact.      Pupils: Pupils are equal, round, and reactive to light.   Cardiovascular:      Rate and Rhythm: Normal rate and regular rhythm.      Heart sounds: Normal heart sounds.   Pulmonary:      Effort: Pulmonary effort is normal.      Breath sounds: Normal breath sounds. No wheezing, rhonchi or rales.   Abdominal:      General: Bowel sounds are normal. There is no distension.      Palpations: Abdomen is soft.      Tenderness: There is no abdominal tenderness.   Musculoskeletal:         General: Normal range of motion.      Cervical back: Normal range of motion.      Lumbar back: No tenderness.   Skin:     General: Skin is warm and dry.      Capillary Refill: Capillary refill takes less than 2 seconds.   Neurological:      General: No focal deficit present.      Mental Status: She is alert and oriented to person, place, and time.   Psychiatric:         Mood and Affect: Mood normal.         Behavior: Behavior normal.         Judgment: Judgment normal.           Assessment:      ICD-10-CM ICD-9-CM   1. Mixed hyperlipidemia  E78.2 272.2   2. Primary hypertension  I10 401.9         Plan:       Check lipid panel and CMP today due to new Repatha use.    Continue with monitoring diet. Avoid processed foods and starches like bread and pasta. Add complex starches to diet like beans, sweet potatoes, two tablespoons of nuts, brown rice, corn. No more than 1/2 cup cooked. Any vegetable is okay. Okay to eat complex carbs like avocado, steel cut oats.     Reschedule lung cancer screening  and mammogram.   See labs below  Follow-up 6 months    Orders Placed This Encounter   Procedures    Comprehensive Metabolic Panel    Lipid Panel     Current Outpatient Medications   Medication Sig Dispense Refill    alendronate (FOSAMAX) 70 MG tablet TAKE ONE TABLET BY MOUTH EVERY 7 DAYS 12 tablet 3    cholecalciferol, vitamin D3, (VITAMIN D3 ORAL) Take by mouth.      citalopram (CELEXA) 20 MG tablet TAKE 1 TABLET BY MOUTH EVERY DAY 90 tablet 2    diclofenac sodium (VOLTAREN) 1 % Gel Apply 2 g topically once daily. 1 Tube 2    evolocumab (REPATHA SURECLICK) 140 mg/mL PnIj Inject 1 mL (140 mg total) into the skin every 14 (fourteen) days. 2 mL 2    losartan (COZAAR) 100 MG tablet TAKE ONE TABLET BY MOUTH DAILY 90 tablet 0    metoprolol succinate (TOPROL-XL) 25 MG 24 hr tablet TAKE 1/2 TABLET BY MOUTH EVERY DAY 45 tablet 3    potassium chloride (MICRO-K) 10 MEQ CpSR TAKE TWO CAPSULES BY MOUTH EVERY  capsule 3    triamterene-hydrochlorothiazide 37.5-25 mg (MAXZIDE-25) 37.5-25 mg per tablet TAKE 1/2 TABLET BY MOUTH EVERY DAY 45 tablet 0    TURMERIC ORAL Take by mouth.      triamcinolone acetonide 0.1% (KENALOG) 0.1 % cream Apply topically 2 (two) times daily. for 10 days 1 each 1     No current facility-administered medications for this visit.       There are no discontinued medications.      Follow up in about 6 months (around 4/4/2024).      Alonso Cortez MD  Scribe Attestation:   I, Justice Ji, am scribing for, and in the presence of, Dr.Arif Cortez I performed the above scribed service and the documentation accurately describes the services I performed. I attest to the accuracy of the note.    I, Dr. Alonso Cortez, reviewed documentation as scribed above. I performed the services described in this documentation.  I agree that the record reflects my personal performance and is accurate and complete. Alonso Cortez MD.  10/04/2023

## 2023-10-30 RX ORDER — TRIAMTERENE/HYDROCHLOROTHIAZID 37.5-25 MG
TABLET ORAL
Qty: 45 TABLET | Refills: 3 | Status: SHIPPED | OUTPATIENT
Start: 2023-10-30

## 2023-10-30 RX ORDER — LOSARTAN POTASSIUM 100 MG/1
TABLET ORAL
Qty: 90 TABLET | Refills: 3 | Status: SHIPPED | OUTPATIENT
Start: 2023-10-30

## 2023-10-30 NOTE — TELEPHONE ENCOUNTER
Care Due:                  Date            Visit Type   Department     Provider  --------------------------------------------------------------------------------                                EP -                              [facility]  Last Visit: 10-      CARE ([facility])   MEDICINE       Alonso Cortez                              [facility]  Next Visit: 04-      CARE ([facility])   [facility]  [place]                                                            Last  Test          Frequency    Reason                     Performed    Due Date  --------------------------------------------------------------------------------    Mg Level....  12 months..  alendronate..............  Not Found    Overdue    Phosphate...  12 months..  alendronate..............  Not Found    Overdue    Vitamin D...  12 months..  alendronate..............  Not Found    Overdue    [employer] Embedded Care Due Messages. Reference number: [id number].   10/30/2023 8:03:58 AM CDT

## 2023-10-30 NOTE — TELEPHONE ENCOUNTER
Provider Staff:  Action required for this patient     Please see care gap opportunities below in Care Due Message.    Thanks!  [facility]     Appointments      Date Provider   Last Visit   10/4/2023 Alonso Cortez MD   Next Visit   4/8/2024 Alonso Cortez MD     Refill Decision Note   Emily Ordaz  is requesting a refill authorization.  Brief Assessment and Rationale for Refill:  Approve     Medication Therapy Plan:         Comments:     Note composed:10:23 AM 10/30/2023

## 2023-11-03 ENCOUNTER — OFFICE VISIT (OUTPATIENT)
Dept: PAIN MEDICINE | Facility: CLINIC | Age: 68
End: 2023-11-03
Payer: MEDICARE

## 2023-11-03 VITALS
HEART RATE: 77 BPM | BODY MASS INDEX: 22.6 KG/M2 | DIASTOLIC BLOOD PRESSURE: 78 MMHG | HEIGHT: 64 IN | WEIGHT: 132.38 LBS | SYSTOLIC BLOOD PRESSURE: 156 MMHG

## 2023-11-03 DIAGNOSIS — M53.3 CHRONIC SI JOINT PAIN: ICD-10-CM

## 2023-11-03 DIAGNOSIS — M46.1 SACROILIITIS: Primary | ICD-10-CM

## 2023-11-03 DIAGNOSIS — M43.06 SPONDYLOLYSIS OF LUMBAR REGION: ICD-10-CM

## 2023-11-03 DIAGNOSIS — M47.816 LUMBAR SPONDYLOSIS: ICD-10-CM

## 2023-11-03 DIAGNOSIS — M51.36 DDD (DEGENERATIVE DISC DISEASE), LUMBAR: ICD-10-CM

## 2023-11-03 DIAGNOSIS — G89.29 CHRONIC SI JOINT PAIN: ICD-10-CM

## 2023-11-03 DIAGNOSIS — M16.0 BILATERAL PRIMARY OSTEOARTHRITIS OF HIP: ICD-10-CM

## 2023-11-03 PROCEDURE — 4010F PR ACE/ARB THEARPY RXD/TAKEN: ICD-10-PCS | Mod: HCNC,CPTII,S$GLB, | Performed by: ANESTHESIOLOGY

## 2023-11-03 PROCEDURE — 3078F PR MOST RECENT DIASTOLIC BLOOD PRESSURE < 80 MM HG: ICD-10-PCS | Mod: HCNC,CPTII,S$GLB, | Performed by: ANESTHESIOLOGY

## 2023-11-03 PROCEDURE — 99999 PR PBB SHADOW E&M-EST. PATIENT-LVL IV: ICD-10-PCS | Mod: PBBFAC,HCNC,, | Performed by: ANESTHESIOLOGY

## 2023-11-03 PROCEDURE — 99204 PR OFFICE/OUTPT VISIT, NEW, LEVL IV, 45-59 MIN: ICD-10-PCS | Mod: HCNC,S$GLB,, | Performed by: ANESTHESIOLOGY

## 2023-11-03 PROCEDURE — 1101F PR PT FALLS ASSESS DOC 0-1 FALLS W/OUT INJ PAST YR: ICD-10-PCS | Mod: HCNC,CPTII,S$GLB, | Performed by: ANESTHESIOLOGY

## 2023-11-03 PROCEDURE — 99999 PR PBB SHADOW E&M-EST. PATIENT-LVL IV: CPT | Mod: PBBFAC,HCNC,, | Performed by: ANESTHESIOLOGY

## 2023-11-03 PROCEDURE — 1125F AMNT PAIN NOTED PAIN PRSNT: CPT | Mod: HCNC,CPTII,S$GLB, | Performed by: ANESTHESIOLOGY

## 2023-11-03 PROCEDURE — 99204 OFFICE O/P NEW MOD 45 MIN: CPT | Mod: HCNC,S$GLB,, | Performed by: ANESTHESIOLOGY

## 2023-11-03 PROCEDURE — 1159F MED LIST DOCD IN RCRD: CPT | Mod: HCNC,CPTII,S$GLB, | Performed by: ANESTHESIOLOGY

## 2023-11-03 PROCEDURE — 3044F HG A1C LEVEL LT 7.0%: CPT | Mod: HCNC,CPTII,S$GLB, | Performed by: ANESTHESIOLOGY

## 2023-11-03 PROCEDURE — 1159F PR MEDICATION LIST DOCUMENTED IN MEDICAL RECORD: ICD-10-PCS | Mod: HCNC,CPTII,S$GLB, | Performed by: ANESTHESIOLOGY

## 2023-11-03 PROCEDURE — 1125F PR PAIN SEVERITY QUANTIFIED, PAIN PRESENT: ICD-10-PCS | Mod: HCNC,CPTII,S$GLB, | Performed by: ANESTHESIOLOGY

## 2023-11-03 PROCEDURE — 3077F SYST BP >= 140 MM HG: CPT | Mod: HCNC,CPTII,S$GLB, | Performed by: ANESTHESIOLOGY

## 2023-11-03 PROCEDURE — 3288F FALL RISK ASSESSMENT DOCD: CPT | Mod: HCNC,CPTII,S$GLB, | Performed by: ANESTHESIOLOGY

## 2023-11-03 PROCEDURE — 1101F PT FALLS ASSESS-DOCD LE1/YR: CPT | Mod: HCNC,CPTII,S$GLB, | Performed by: ANESTHESIOLOGY

## 2023-11-03 PROCEDURE — 3077F PR MOST RECENT SYSTOLIC BLOOD PRESSURE >= 140 MM HG: ICD-10-PCS | Mod: HCNC,CPTII,S$GLB, | Performed by: ANESTHESIOLOGY

## 2023-11-03 PROCEDURE — 4010F ACE/ARB THERAPY RXD/TAKEN: CPT | Mod: HCNC,CPTII,S$GLB, | Performed by: ANESTHESIOLOGY

## 2023-11-03 PROCEDURE — 3008F PR BODY MASS INDEX (BMI) DOCUMENTED: ICD-10-PCS | Mod: HCNC,CPTII,S$GLB, | Performed by: ANESTHESIOLOGY

## 2023-11-03 PROCEDURE — 3288F PR FALLS RISK ASSESSMENT DOCUMENTED: ICD-10-PCS | Mod: HCNC,CPTII,S$GLB, | Performed by: ANESTHESIOLOGY

## 2023-11-03 PROCEDURE — 3078F DIAST BP <80 MM HG: CPT | Mod: HCNC,CPTII,S$GLB, | Performed by: ANESTHESIOLOGY

## 2023-11-03 PROCEDURE — 3008F BODY MASS INDEX DOCD: CPT | Mod: HCNC,CPTII,S$GLB, | Performed by: ANESTHESIOLOGY

## 2023-11-03 PROCEDURE — 3044F PR MOST RECENT HEMOGLOBIN A1C LEVEL <7.0%: ICD-10-PCS | Mod: HCNC,CPTII,S$GLB, | Performed by: ANESTHESIOLOGY

## 2023-11-03 RX ORDER — MELOXICAM 7.5 MG/1
7.5 TABLET ORAL DAILY PRN
Qty: 30 TABLET | Refills: 0 | Status: SHIPPED | OUTPATIENT
Start: 2023-11-03

## 2023-11-03 NOTE — PROGRESS NOTES
New Patient Interventional Pain Note (Initial Visit)    Referring Physician: Alonso Cortez MD    PCP: Alonso Cortez MD    Chief Complaint:  Lower back pain       SUBJECTIVE:    Emily Ordaz is a 68 y.o. female who presents to the clinic for the evaluation of lower back pain.   Patient reports 1 year history of lower back pain.  Patient denies any significant inciting traumas to this lower back pain.  Patient denies any previous surgical intervention or lumbar spine.    Pain is described as a aching throbbing pain primarily in her lower back and bilateral buttocks area.  Patient denies that buttocks pain is typically worse than lumbar spine pain.  Patient reports occasional radiation to her posterior thighs.  Patient denies any significant radiation beyond her knees.  Patient denies any significant numbness or tingling of her bilateral feet.  Pain is typically worse with prolonged standing and walking upstairs, better with anti-inflammatories.  Pain is currently rated a 2/10, but can increase to an 8/10 with exacerbating activities. Denies any fevers, chills,  saddle anesthesia, or bowel and bladder incontinence      Non-Pharmacologic Treatments:  Physical Therapy/Home Exercise: yes  Ice/Heat:yes  TENS: no  Acupuncture: no  Massage: yes  Chiropractic: no        Previous Pain Medications:  Tylenol, ibuprofen, Flexeril, topicals     report:  Reviewed and consistent with medication use as prescribed.    Pain Procedures:   None    Pain Disability Index Review:         11/3/2023     8:57 AM   Last 3 PDI Scores   Pain Disability Index (PDI) 15       Imaging:   Results for orders placed during the hospital encounter of 06/29/22    X-Ray Lumbar Spine AP And Lateral    Narrative  EXAMINATION:  XR LUMBAR SPINE AP AND LATERAL    CLINICAL HISTORY:  Low back pain, unspecified    TECHNIQUE:  AP, lateral and spot images were performed of the lumbar spine.    COMPARISON:  None    FINDINGS:  The vertebral body heights and  alignment are maintained throughout the lumbar spine.  5 mm anterolisthesis of L4 on L5.  Multilevel degenerative changes with osteophytes and endplate sclerosis.  Intervertebral disc spaces are well maintained.    Atherosclerotic calcifications of the aorta.    Impression  Lumbar spondylosis with grade 1 L4 spondylolisthesis.      Electronically signed by: Cuauhtemoc Phillips  Date:    06/29/2022  Time:    20:12      Past Medical History:   Diagnosis Date    Arthritis     Hyperlipidemia     Hypertension     Osteoporosis      No past surgical history on file.  Social History     Socioeconomic History    Marital status:    Tobacco Use    Smoking status: Some Days     Current packs/day: 0.50     Average packs/day: 0.5 packs/day for 45.0 years (22.5 ttl pk-yrs)     Types: Cigarettes    Smokeless tobacco: Never   Substance and Sexual Activity    Alcohol use: No     Alcohol/week: 0.0 standard drinks of alcohol    Drug use: No    Sexual activity: Never     Partners: Male     Birth control/protection: None     Social Determinants of Health     Financial Resource Strain: Medium Risk (1/1/2020)    Overall Financial Resource Strain (CARDIA)     Difficulty of Paying Living Expenses: Somewhat hard   Food Insecurity: No Food Insecurity (1/1/2020)    Hunger Vital Sign     Worried About Running Out of Food in the Last Year: Never true     Ran Out of Food in the Last Year: Never true   Transportation Needs: No Transportation Needs (1/1/2020)    PRAPARE - Transportation     Lack of Transportation (Medical): No     Lack of Transportation (Non-Medical): No   Physical Activity: Inactive (1/1/2020)    Exercise Vital Sign     Days of Exercise per Week: 0 days     Minutes of Exercise per Session: 0 min   Stress: No Stress Concern Present (1/1/2020)    [employer] - Occupational Stress Questionnaire     Feeling of Stress : Only a little   Social Connections: Unknown (1/1/2020)    Social Connection and Isolation  Panel [NHANES]     Frequency of Communication with Friends and Family: More than three times a week     Frequency of Social Gatherings with Friends and Family: Once a week     Active Member of Clubs or Organizations: No     Attends Club or Organization Meetings: Never     Marital Status: Living with partner     Family History   Problem Relation Age of Onset    Breast cancer Paternal Aunt        Review of patient's allergies indicates:   Allergen Reactions    Statins-hmg-coa reductase inhibitors      Leg cramps and pain        Current Outpatient Medications   Medication Sig    alendronate (FOSAMAX) 70 MG tablet TAKE ONE TABLET BY MOUTH EVERY 7 DAYS    cholecalciferol, vitamin D3, (VITAMIN D3 ORAL) Take by mouth.    citalopram (CELEXA) 20 MG tablet TAKE 1 TABLET BY MOUTH EVERY DAY    diclofenac sodium (VOLTAREN) 1 % Gel Apply 2 g topically once daily.    evolocumab (REPATHA SURECLICK) 140 mg/mL PnIj Inject 1 mL (140 mg total) into the skin every 14 (fourteen) days.    losartan (COZAAR) 100 MG tablet TAKE ONE TABLET BY MOUTH DAILY    metoprolol succinate (TOPROL-XL) 25 MG 24 hr tablet TAKE 1/2 TABLET BY MOUTH EVERY DAY    potassium chloride (MICRO-K) 10 MEQ CpSR TAKE TWO CAPSULES BY MOUTH EVERY DAY    triamcinolone acetonide 0.1% (KENALOG) 0.1 % cream Apply topically 2 (two) times daily. for 10 days    triamterene-hydrochlorothiazide 37.5-25 mg (MAXZIDE-25) 37.5-25 mg per tablet TAKE 1/2 TABLET BY MOUTH EVERY DAY    TURMERIC ORAL Take by mouth.    meloxicam (MOBIC) 7.5 MG tablet Take 1 tablet (7.5 mg total) by mouth daily as needed for Pain.     No current facility-administered medications for this visit.         ROS  Review of Systems   Constitutional:  Negative for chills, diaphoresis, fatigue and fever.   HENT:  Negative for ear discharge, ear pain, rhinorrhea, trouble swallowing and voice change.    Eyes:  Negative for pain and redness.   Respiratory:  Negative for chest tightness, shortness of breath, wheezing and  "stridor.    Cardiovascular:  Negative for chest pain and leg swelling.   Gastrointestinal:  Negative for blood in stool, diarrhea, nausea and vomiting.   Endocrine: Negative for cold intolerance and heat intolerance.   Genitourinary:  Negative for dysuria, hematuria and urgency.   Musculoskeletal:  Positive for arthralgias, back pain, gait problem and myalgias. Negative for joint swelling, neck pain and neck stiffness.   Skin:  Negative for rash.   Neurological:  Positive for weakness. Negative for tremors, seizures, speech difficulty, light-headedness, numbness and headaches.   Hematological:  Does not bruise/bleed easily.   Psychiatric/Behavioral:  Negative for agitation, confusion and suicidal ideas.             OBJECTIVE:  BP (!) 156/78   Pulse 77   Ht 5' 4" (1.626 m)   Wt 60.1 kg (132 lb 6.2 oz)   LMP 03/16/1997 (LMP Unknown)   BMI 22.72 kg/m²         Physical Exam  Constitutional:       General: She is not in acute distress.     Appearance: Normal appearance. She is not ill-appearing.   HENT:      Head: Normocephalic and atraumatic.      Nose: No congestion or rhinorrhea.   Eyes:      Extraocular Movements: Extraocular movements intact.      Pupils: Pupils are equal, round, and reactive to light.   Cardiovascular:      Pulses: Normal pulses.   Pulmonary:      Effort: Pulmonary effort is normal.   Abdominal:      General: Abdomen is flat.      Palpations: Abdomen is soft.   Skin:     General: Skin is warm and dry.      Capillary Refill: Capillary refill takes less than 2 seconds.   Neurological:      General: No focal deficit present.      Mental Status: She is alert and oriented to person, place, and time.      Sensory: No sensory deficit.      Motor: Weakness present. No abnormal muscle tone.      Gait: Gait abnormal.      Deep Tendon Reflexes:      Reflex Scores:       Patellar reflexes are 1+ on the right side and 1+ on the left side.       Achilles reflexes are 1+ on the right side and 1+ on the left " side.     Comments: 4/5 strength in right knee extension   Psychiatric:         Mood and Affect: Mood normal.         Behavior: Behavior normal.         Thought Content: Thought content normal.           Musculoskeletal:      Lumbar Exam  Incision: no  Pain with Flexion: no  Pain with Extension: yes  ROM:  Decreased  Paraspinous TTP:  Mild bilaterally  Facet TTP:  L5-S1  Facet Loading:  Negative bilaterally  SLR:  Negative bilaterally  SIJ TTP:  Positive bilaterally  ANKIT:  Positive bilaterally with positive compression and distraction      LABS:  Lab Results   Component Value Date    WBC 7.28 07/17/2023    HGB 12.8 07/17/2023    HCT 38.2 07/17/2023    MCV 93 07/17/2023     07/17/2023       CMP  Sodium   Date Value Ref Range Status   10/04/2023 141 136 - 145 mmol/L Final     Potassium   Date Value Ref Range Status   10/04/2023 4.2 3.5 - 5.1 mmol/L Final     Chloride   Date Value Ref Range Status   10/04/2023 105 95 - 110 mmol/L Final     CO2   Date Value Ref Range Status   10/04/2023 27 23 - 29 mmol/L Final     Glucose   Date Value Ref Range Status   10/04/2023 92 70 - 110 mg/dL Final     BUN   Date Value Ref Range Status   10/04/2023 15 8 - 23 mg/dL Final     Creatinine   Date Value Ref Range Status   10/04/2023 0.8 0.5 - 1.4 mg/dL Final     Calcium   Date Value Ref Range Status   10/04/2023 9.7 8.7 - 10.5 mg/dL Final     Total Protein   Date Value Ref Range Status   10/04/2023 7.7 6.0 - 8.4 g/dL Final     Albumin   Date Value Ref Range Status   10/04/2023 4.0 3.5 - 5.2 g/dL Final     Total Bilirubin   Date Value Ref Range Status   10/04/2023 0.4 0.1 - 1.0 mg/dL Final     Comment:     For infants and newborns, interpretation of results should be based  on gestational age, weight and in agreement with clinical  observations.    Premature Infant recommended reference ranges:  Up to 24 hours.............<8.0 mg/dL  Up to 48 hours............<12.0 mg/dL  3-5 days..................<15.0 mg/dL  6-29  days.................<15.0 mg/dL       Alkaline Phosphatase   Date Value Ref Range Status   10/04/2023 73 55 - 135 U/L Final     AST   Date Value Ref Range Status   10/04/2023 25 10 - 40 U/L Final     ALT   Date Value Ref Range Status   10/04/2023 15 10 - 44 U/L Final     Anion Gap   Date Value Ref Range Status   10/04/2023 9 8 - 16 mmol/L Final     eGFR if    Date Value Ref Range Status   01/31/2022 >60.0 >60 mL/min/1.73 m^2 Final     eGFR if non    Date Value Ref Range Status   01/31/2022 >60.0 >60 mL/min/1.73 m^2 Final     Comment:     Calculation used to obtain the estimated glomerular filtration  rate (eGFR) is the CKD-EPI equation.          Lab Results   Component Value Date    HGBA1C 5.1 07/17/2023             ASSESSMENT:       68 y.o. year old female with lower back pain, consistent with     1. Sacroiliitis  X-Ray Hips Bilateral 2 View Incl AP Pelvis    Ambulatory referral/consult to Physical/Occupational Therapy    meloxicam (MOBIC) 7.5 MG tablet      2. Chronic SI joint pain  Ambulatory referral/consult to Pain Clinic    meloxicam (MOBIC) 7.5 MG tablet      3. Bilateral primary osteoarthritis of hip  X-Ray Hips Bilateral 2 View Incl AP Pelvis    meloxicam (MOBIC) 7.5 MG tablet      4. Lumbar spondylosis  meloxicam (MOBIC) 7.5 MG tablet      5. DDD (degenerative disc disease), lumbar  meloxicam (MOBIC) 7.5 MG tablet      6. Spondylolysis of lumbar region  meloxicam (MOBIC) 7.5 MG tablet        Sacroiliitis  -     X-Ray Hips Bilateral 2 View Incl AP Pelvis; Future; Expected date: 11/03/2023  -     Ambulatory referral/consult to Physical/Occupational Therapy; Future; Expected date: 11/10/2023  -     meloxicam (MOBIC) 7.5 MG tablet; Take 1 tablet (7.5 mg total) by mouth daily as needed for Pain.  Dispense: 30 tablet; Refill: 0    Chronic SI joint pain  -     Ambulatory referral/consult to Pain Clinic  -     meloxicam (MOBIC) 7.5 MG tablet; Take 1 tablet (7.5 mg total) by mouth  daily as needed for Pain.  Dispense: 30 tablet; Refill: 0    Bilateral primary osteoarthritis of hip  -     X-Ray Hips Bilateral 2 View Incl AP Pelvis; Future; Expected date: 11/03/2023  -     meloxicam (MOBIC) 7.5 MG tablet; Take 1 tablet (7.5 mg total) by mouth daily as needed for Pain.  Dispense: 30 tablet; Refill: 0    Lumbar spondylosis  -     meloxicam (MOBIC) 7.5 MG tablet; Take 1 tablet (7.5 mg total) by mouth daily as needed for Pain.  Dispense: 30 tablet; Refill: 0    DDD (degenerative disc disease), lumbar  -     meloxicam (MOBIC) 7.5 MG tablet; Take 1 tablet (7.5 mg total) by mouth daily as needed for Pain.  Dispense: 30 tablet; Refill: 0    Spondylolysis of lumbar region  -     meloxicam (MOBIC) 7.5 MG tablet; Take 1 tablet (7.5 mg total) by mouth daily as needed for Pain.  Dispense: 30 tablet; Refill: 0             PLAN:   - Interventions:   Note this time.  Can consider bilateral sacroiliac joint injection if pain continues.  - Anticoagulation use:   No no anticoagulation    - Medications:   Start Mobic 7.5 mg daily as needed    - Therapy:    We will refer patient to physical therapy for lower back pain consistent with sacroiliitis    - Imaging/Diagnostic:   X-rays of lumbar spine reviewed and findings discussed with patient.  Can not for grade 1 anterolisthesis of L4 upon L5 were noted degenerative changes of the right sacroiliac joint.   We will obtain updated x-rays of hips and pelvis to evaluate worsening lower back pain consistent with sacroiliitis and possible overlapping greater trochanteric bursitis    - Consults:   None at this time      - Patient Questions: Answered all of the patient's questions regarding diagnosis, therapy, and treatment     This condition does not require this patient to take time off of work, and the primary goal of our Pain Management services is to improve the patient's functional capacity.     - Follow up visit: return to clinic in 5-6 weeks        The above plan and  management options were discussed at length with patient. Patient is in agreement with the above and verbalized understanding.    I discussed the goals of interventional chronic pain management with the patient on today's visit.  I explained the utility of injections for diagnostic and therapeutic purposes.  We discussed a multimodal approach to pain including treating the patient's given worst pain at any given time.  We will use a systematic approach to addressing pain.  We will also adopt a multimodal approach that includes injections, adjuvant medications, physical therapy, at times psychiatry.  There may be a limited role for opioid use intermittently in the treatment of pain, more particularly for acute pain although no one approach can be used as a sole treatment modality.    I emphasized the importance of regular exercise, core strengthening and stretching, diet and weight loss as a cornerstone of long-term pain management.      Cuauhtemoc Alonso MD  Interventional Pain Management  [facility]    Disclaimer:  This note was prepared using voice recognition system and is likely to have sound alike errors that may have been overlooked even after proof reading.  Please call me with any questions

## 2023-11-13 ENCOUNTER — HOSPITAL ENCOUNTER (OUTPATIENT)
Dept: RADIOLOGY | Facility: HOSPITAL | Age: 68
Discharge: HOME | End: 2023-11-13
Attending: FAMILY MEDICINE
Payer: MEDICARE

## 2023-11-13 VITALS — HEIGHT: 64 IN | BODY MASS INDEX: 22.62 KG/M2 | WEIGHT: 132.5 LBS

## 2023-11-13 DIAGNOSIS — F17.200 SMOKER: ICD-10-CM

## 2023-11-13 DIAGNOSIS — Z12.31 BREAST CANCER SCREENING BY MAMMOGRAM: ICD-10-CM

## 2023-11-13 PROCEDURE — 77063 MAMMO DIGITAL SCREENING BILAT WITH TOMO: ICD-10-PCS | Mod: 26,HCNC,, | Performed by: RADIOLOGY

## 2023-11-13 PROCEDURE — 77067 SCR MAMMO BI INCL CAD: CPT | Mod: 26,HCNC,, | Performed by: RADIOLOGY

## 2023-11-13 PROCEDURE — 71271 CT THORAX LUNG CANCER SCR C-: CPT | Mod: 26,DBM,HCNC, | Performed by: RADIOLOGY

## 2023-11-13 PROCEDURE — 71271 CT CHEST LUNG SCREENING LOW DOSE: ICD-10-PCS | Mod: 26,DBM,HCNC, | Performed by: RADIOLOGY

## 2023-11-13 PROCEDURE — 71271 CT THORAX LUNG CANCER SCR C-: CPT | Mod: DBM,TC,HCNC

## 2023-11-13 PROCEDURE — 77067 SCR MAMMO BI INCL CAD: CPT | Mod: TC,HCNC

## 2023-11-13 PROCEDURE — 77063 BREAST TOMOSYNTHESIS BI: CPT | Mod: 26,HCNC,, | Performed by: RADIOLOGY

## 2023-11-13 PROCEDURE — 77067 MAMMO DIGITAL SCREENING BILAT WITH TOMO: ICD-10-PCS | Mod: 26,HCNC,, | Performed by: RADIOLOGY

## 2023-11-14 ENCOUNTER — TELEPHONE (OUTPATIENT)
Dept: RADIOLOGY | Facility: HOSPITAL | Age: 68
End: 2023-11-14

## 2023-11-15 DIAGNOSIS — T46.6X5A MYALGIA DUE TO HMG COA REDUCTASE INHIBITOR: ICD-10-CM

## 2023-11-15 DIAGNOSIS — M79.10 MYALGIA DUE TO HMG COA REDUCTASE INHIBITOR: ICD-10-CM

## 2023-11-15 DIAGNOSIS — E78.2 MIXED HYPERLIPIDEMIA: ICD-10-CM

## 2023-11-15 RX ORDER — EVOLOCUMAB 140 MG/ML
140 INJECTION, SOLUTION SUBCUTANEOUS
Qty: 2 ML | Refills: 2 | Status: ACTIVE | OUTPATIENT
Start: 2023-11-15 | End: 2024-02-20 | Stop reason: SDUPTHER

## 2023-11-29 ENCOUNTER — HOSPITAL ENCOUNTER (OUTPATIENT)
Dept: RADIOLOGY | Facility: HOSPITAL | Age: 68
Discharge: HOME | End: 2023-11-29
Attending: FAMILY MEDICINE
Payer: MEDICARE

## 2023-11-29 DIAGNOSIS — R92.8 ABNORMAL MAMMOGRAM: ICD-10-CM

## 2023-11-29 PROCEDURE — 77065 DX MAMMO INCL CAD UNI: CPT | Mod: TC,HCNC,LT

## 2023-11-29 PROCEDURE — 77065 MAMMO DIGITAL DIAGNOSTIC LEFT WITH TOMO: ICD-10-PCS | Mod: 26,HCNC,LT, | Performed by: RADIOLOGY

## 2023-11-29 PROCEDURE — 77061 MAMMO DIGITAL DIAGNOSTIC LEFT WITH TOMO: ICD-10-PCS | Mod: 26,HCNC,LT, | Performed by: RADIOLOGY

## 2023-11-29 PROCEDURE — 77061 BREAST TOMOSYNTHESIS UNI: CPT | Mod: 26,HCNC,LT, | Performed by: RADIOLOGY

## 2023-11-29 PROCEDURE — 76642 ULTRASOUND BREAST LIMITED: CPT | Mod: TC,HCNC,LT

## 2023-11-29 PROCEDURE — 76642 ULTRASOUND BREAST LIMITED: CPT | Mod: 26,HCNC,LT, | Performed by: RADIOLOGY

## 2023-11-29 PROCEDURE — 76642 US BREAST LEFT LIMITED: ICD-10-PCS | Mod: 26,HCNC,LT, | Performed by: RADIOLOGY

## 2023-11-29 PROCEDURE — 77065 DX MAMMO INCL CAD UNI: CPT | Mod: 26,HCNC,LT, | Performed by: RADIOLOGY

## 2023-12-04 ENCOUNTER — CLINICAL SUPPORT (OUTPATIENT)
Dept: REHABILITATION | Facility: HOSPITAL | Age: 68
End: 2023-12-04
Attending: ANESTHESIOLOGY
Payer: MEDICARE

## 2023-12-04 DIAGNOSIS — M46.1 SACROILIITIS: ICD-10-CM

## 2023-12-04 DIAGNOSIS — M25.552 LEFT HIP PAIN: Primary | ICD-10-CM

## 2023-12-04 PROCEDURE — 97110 THERAPEUTIC EXERCISES: CPT | Mod: HCNC,PN

## 2023-12-04 PROCEDURE — 97161 PT EVAL LOW COMPLEX 20 MIN: CPT | Mod: HCNC,PN

## 2023-12-05 PROBLEM — M25.552 LEFT HIP PAIN: Status: ACTIVE | Noted: 2023-12-05

## 2023-12-05 NOTE — PLAN OF CARE
[facility]   Physical Therapy Initial Evaluation     Date: 12/4/2023   Name: Emily Ordaz  Clinic Number: [id number]    Therapy Diagnosis:   Encounter Diagnoses   Name Primary?    Sacroiliitis     Left hip pain Yes     Physician: Cuauhtemoc Alonso MD    Physician Orders: PT Eval and Treat   Medical Diagnosis from Referral: Sacroiliitis [M46.1]   Evaluation Date: 12/4/2023  Authorization Period Expiration: 11/2/2024  Plan of Care Expiration: 11/2/2024  Progress Note Due: 1/4/2024  Visit # / Visits authorized: 1/1 eval  FOTO: 1/3 (last performed 12/4/2023)     Precautions: Standard     Time In: 9:30  Time Out: 10:08  Total Appointment Time (timed & untimed codes): 38 minutes      SUBJECTIVE     Date of onset: several months     History of current condition - Emily reports: insidious onset of left hip pain several months ago. States significant improvement since onset. She has seen pain management and was referred to therapy. Pt states she also notes increased knee and hip pain going up and down stairs but improved with repetition. Denies any pain in the last few weeks.     Falls: none     Imaging: [x] Xray for hips - not released  [] MRI [] CT    Prior Therapy:   [x] N/A    [] Yes:   Social History: Pt lives with their family and lives with their spouse; 2 ALEC   Occupation: Pt is retired   Prior Level of Function: Independent and pain free with all ADL, IADL, community mobility and functional activities.   Current Level of Function: Independent with all ADL, IADL, community mobility and functional activities with reports of increased pain and need for increased time and frequent breaks.      Pain:  Current 0/10, worst 0/10  Location: [] Right   [x] Left:  -  Description: NA  Aggravating Factors: NA  Easing Factors: NA    Patients goals: would like to learn exercises to get stronger      Medical History:   Past Medical History:   Diagnosis Date    Arthritis     Hyperlipidemia      Hypertension     Osteoporosis        Surgical History:   Emily Ordaz  has no past surgical history on file.    Medications:   Emily has a current medication list which includes the following prescription(s): alendronate, cholecalciferol (vitamin d3), citalopram, diclofenac sodium, repatha sureclick, losartan, meloxicam, metoprolol succinate, potassium chloride, triamcinolone acetonide 0.1%, triamterene-hydrochlorothiazide 37.5-25 mg, and turmeric.    Allergies:   Review of patient's allergies indicates:   Allergen Reactions    Statins-hmg-coa reductase inhibitors      Leg cramps and pain         OBJECTIVE     POSTURE: Pt presents with postural abnormalities which include:    [x] Forward Head   [] Increased Lumbar Lordosis   [x] Rounded Shoulder   [] Genu Recurvatum   [] Increased Thoracic Kyphosis [] Genu Valgus   [] Trunk Deviated    [] Pes Planus   [] Scapular Winging    [] Other:     GAIT: good aleksandr, good step length, mild limp to left     SFMA:  Top Tiers Right  (Spine) Left Notes    Multi-segmental   Flexion DN knee     Hamstring tightness   Multi-segmental Extension DN thoracic hypombolity      Multi-segmental   side bend  DN DN    Multi-segmental Rotation  DN 30% limited DN 30% limited    Single Leg Stance  (10 sec) DN DN unsteady     FN = Functional Normal   FP = Functional Painful  DN = Dysfunctional Normal   DP = Dysfuncional Painful     STRENGTH:   Lower Extremity  Strength RIGHT   LEFT   Hip Flexion  []1  []2  []3  [x]4  []5      [x]+ []- []1  []2  []3  [x]4  []5      [x]+ []-   Knee Flexion []1  []2  []3  [x]4  []5      [x]+ []- []1  []2  []3  [x]4  []5      [x]+ []-   Knee Extension []1  []2  []3  [x]4  []5      [x]+ []- []1  []2  []3  [x]4  []5      [x]+ []-   Ankle Dorsiflexion []1  []2  []3  [x]4  []5      [x]+ []- []1  []2  []3  [x]4  []5      [x]+ []-   Note: mild discomfort to left hip with L hip flexion MMT     RANGE OF MOTION: (*) pain and/or dysfunction  Hip AROM/PROM Right Left  Notes   Hip Flexion (120º) WFL WFL    Hip Internal Rotation  (45º) WFL Mild hypomobility    Hip ER (45º) WFL Mild hypermobility      Knee AROM/PROM Right Left Notes   Knee Flexion (135º) WFL WFL    Knee Extension (0º) WFL WFL      Ankle/Foot AROM/PROM Right Left Notes   Dorsiflexion (20º) WFL WFL      SPECIAL TESTS:   Ely's test = positive bilaterally   SLR to asses hamstring length = positive left hamstring tightness    SENSATION:  [x] Intact to Light Touch     [] Impaired:    PALPATION: none    JOINT MOBILITY: mild hypomobility to left hip     Intake Outcome Measure for FOTO hip Survey    Therapist reviewed FOTO scores for Emily Ordaz on 12/4/2023.   FOTO documents entered into [facility] - see Media section.    Intake Score: 65%       TREATMENT     Total Treatment time (time-based codes) separate from Evaluation: 15 minutes      Emily received the treatments listed below:      manual therapy techniques: were applied to - for (-) minutes , including:     therapeutic exercises to develop strength, endurance, ROM, and flexibility for (15) minutesincluding:   Seated hamstring str    Standing quad str   Single leg stance    Standing heel and toe raises    Standing 3 way hip (flexion, abduction, and extension)   Sit to stand     neuromuscular re-education activities to improve: Balance, Coordination, Proprioception, Posture, and stability for (-) minutes . The following activities were included:    therapeutic activities to improve functional performance for (-) minutes including:    gait training to improve functional mobility and safety for (-) minutes, including:    PATIENT EDUCATION AND HOME EXERCISES     Education provided:  (10) minutes   - HEP provided   - PT role in POC   - different modification with ascending/descending stairs if exacerbation occurs     Written Home Exercises Provided: yes.  Exercises were reviewed and Emily was able to demonstrate them prior to the end of the session.  Emily  demonstrated good  understanding of the education provided. See EMR under Patient Instructions for exercises provided during therapy sessions.    ASSESSMENT     Emily is a 68 y.o. female referred to outpatient Physical Therapy with a medical diagnosis of Sacroiliitis [M46.1]. Pt presents with impairments in the following categories: IMPAIRMENTS: ROM, joint mobility, muscle length, and balance. Provided written HEP for patient to continue improving ROM, strength, and balance at home. If pain persists within the month, recommended pt to follow up with me.     Patient prognosis is Excellent.   Patient will benefit from skilled outpatient Physical Therapy to address the deficits stated above and in the chart below, provide patient /family education, and to maximize patientt's level of independence.     Plan of care discussed with patient: yes   Patient's spiritual, cultural and educational needs considered and patient is agreeable to the plan of care and goals as stated below:     Anticipated Barriers for therapy: none    Medical Necessity is demonstrated by the following  History  Co-morbidities and personal factors that may impact the plan of care [x] LOW: no personal factors / co-morbidities  [] MODERATE: 1-2 personal factors / co-morbidities  [] HIGH: 3+ personal factors / co-morbidities    Moderate / High Support Documentation:   Past Medical History:   Diagnosis Date    Arthritis     Hyperlipidemia     Hypertension     Osteoporosis         Examination  Body Structures and Functions, activity limitations and participation restrictions that may impact the plan of care [x] LOW: addressing 1-2 elements  [] MODERATE: 3+ elements  [] HIGH: 4+ elements (please support below)    Moderate / High Support Documentation: na     Clinical Presentation [x] LOW: stable  [] MODERATE: Evolving  [] HIGH: Unstable     Decision Making/ Complexity Score: low         Short Term Goals:  2 weeks Status  Date Met   HEP: Patient will  demonstrate independence with HEP in order to progress toward functional independence. [x] Progressing  [] Met  [] Not Met      Long Term Goals:  4 weeks Status Date Met   PAIN: Pt will report worst pain of 0/10 in order to progress toward max functional ability and improve quality of life [x] Progressing  [] Met  [] Not Met    FUNCTION: Patient will demonstrate improved function as indicated by a score of greater than or equal to 70 out of 100 on FOTO. [x] Progressing  [] Met  [] Not Met    MOBILITY: Patient will improve trunk rotation AROM by 15% in order to return to maximal functional potential and improve quality of life.  [x] Progressing  [] Met  [] Not Met    GAIT: Patient will demonstrate normalized gait mechanics with minimal compensation in order to return to PLOF. [x] Progressing  [] Met  [] Not Met    Patient will return to normal ADL's, IADL's, community involvement, recreational activities, and work-related activities with less than or equal to 0/10 pain and maximal function.  [x] Progressing  [] Met  [] Not Met        PLAN   Plan of care Certification: 12/4/2023 to 1/5/2024.    Outpatient Physical Therapy 2 times weekly for 4 weeks to include any combination of the following interventions: virtual visits, dry needling, modalities, electrical stimulation (IFC, Pre-Mod, Attended with Functional Dry Needling), Cervical/Lumbar Traction, Electrical Stimulation , Gait Training, Manual Therapy, Neuromuscular Re-ed, Patient Education, Self Care, Therapeutic Exercise, FDN, and Therapeutic Activites     Dilma Bravo, PT, DPT      I CERTIFY THE NEED FOR THESE SERVICES FURNISHED UNDER THIS PLAN OF TREATMENT AND WHILE UNDER MY CARE   Physician's comments:     Physician's Signature: ___________________________________________________

## 2024-01-04 ENCOUNTER — OFFICE VISIT (OUTPATIENT)
Dept: FAMILY MEDICINE | Facility: CLINIC | Age: 69
End: 2024-01-04
Payer: MEDICARE

## 2024-01-04 VITALS
BODY MASS INDEX: 22.26 KG/M2 | DIASTOLIC BLOOD PRESSURE: 70 MMHG | RESPIRATION RATE: 18 BRPM | HEART RATE: 80 BPM | HEIGHT: 64 IN | SYSTOLIC BLOOD PRESSURE: 122 MMHG | WEIGHT: 130.38 LBS | OXYGEN SATURATION: 100 %

## 2024-01-04 DIAGNOSIS — F32.5 MAJOR DEPRESSIVE DISORDER IN FULL REMISSION, UNSPECIFIED WHETHER RECURRENT: ICD-10-CM

## 2024-01-04 DIAGNOSIS — Z00.00 ENCOUNTER FOR MEDICARE ANNUAL WELLNESS EXAM: ICD-10-CM

## 2024-01-04 DIAGNOSIS — I70.0 AORTIC ATHEROSCLEROSIS: ICD-10-CM

## 2024-01-04 DIAGNOSIS — F17.200 SMOKING: ICD-10-CM

## 2024-01-04 DIAGNOSIS — R26.9 ABNORMALITY OF GAIT AND MOBILITY: ICD-10-CM

## 2024-01-04 DIAGNOSIS — M81.0 AGE-RELATED OSTEOPOROSIS WITHOUT CURRENT PATHOLOGICAL FRACTURE: ICD-10-CM

## 2024-01-04 DIAGNOSIS — I10 PRIMARY HYPERTENSION: ICD-10-CM

## 2024-01-04 DIAGNOSIS — Z23 NEED FOR VACCINATION: ICD-10-CM

## 2024-01-04 DIAGNOSIS — Z00.00 ENCOUNTER FOR PREVENTIVE HEALTH EXAMINATION: Primary | ICD-10-CM

## 2024-01-04 DIAGNOSIS — M46.1 SACROILIITIS: ICD-10-CM

## 2024-01-04 DIAGNOSIS — E78.2 MIXED HYPERLIPIDEMIA: ICD-10-CM

## 2024-01-04 PROCEDURE — G0008 ADMIN INFLUENZA VIRUS VAC: HCPCS | Mod: HCNC,S$GLB,, | Performed by: NURSE PRACTITIONER

## 2024-01-04 PROCEDURE — 1126F AMNT PAIN NOTED NONE PRSNT: CPT | Mod: HCNC,CPTII,S$GLB, | Performed by: NURSE PRACTITIONER

## 2024-01-04 PROCEDURE — 99999 PR PBB SHADOW E&M-EST. PATIENT-LVL V: CPT | Mod: PBBFAC,HCNC,, | Performed by: NURSE PRACTITIONER

## 2024-01-04 PROCEDURE — G0439 PPPS, SUBSEQ VISIT: HCPCS | Mod: HCNC,S$GLB,, | Performed by: NURSE PRACTITIONER

## 2024-01-04 PROCEDURE — 1160F RVW MEDS BY RX/DR IN RCRD: CPT | Mod: HCNC,CPTII,S$GLB, | Performed by: NURSE PRACTITIONER

## 2024-01-04 PROCEDURE — 1101F PT FALLS ASSESS-DOCD LE1/YR: CPT | Mod: HCNC,CPTII,S$GLB, | Performed by: NURSE PRACTITIONER

## 2024-01-04 PROCEDURE — 90694 VACC AIIV4 NO PRSRV 0.5ML IM: CPT | Mod: HCNC,S$GLB,, | Performed by: NURSE PRACTITIONER

## 2024-01-04 PROCEDURE — 3078F DIAST BP <80 MM HG: CPT | Mod: HCNC,CPTII,S$GLB, | Performed by: NURSE PRACTITIONER

## 2024-01-04 PROCEDURE — 3074F SYST BP LT 130 MM HG: CPT | Mod: HCNC,CPTII,S$GLB, | Performed by: NURSE PRACTITIONER

## 2024-01-04 PROCEDURE — 1159F MED LIST DOCD IN RCRD: CPT | Mod: HCNC,CPTII,S$GLB, | Performed by: NURSE PRACTITIONER

## 2024-01-04 PROCEDURE — 3288F FALL RISK ASSESSMENT DOCD: CPT | Mod: HCNC,CPTII,S$GLB, | Performed by: NURSE PRACTITIONER

## 2024-01-04 PROCEDURE — 1170F FXNL STATUS ASSESSED: CPT | Mod: HCNC,CPTII,S$GLB, | Performed by: NURSE PRACTITIONER

## 2024-01-04 RX ORDER — AZELASTINE 1 MG/ML
1 SPRAY, METERED NASAL 2 TIMES DAILY
COMMUNITY
Start: 2023-12-15

## 2024-01-04 RX ORDER — DEXBROMPHENIRAMINE MALEATE 2 MG/1
1 TABLET ORAL EVERY MORNING
COMMUNITY
Start: 2023-12-15

## 2024-01-04 NOTE — PROGRESS NOTES
"  Emily Ordaz presented for a  Medicare AWV and comprehensive Health Risk Assessment today. The following components were reviewed and updated:    Medical history  Family History  Social history  Allergies and Current Medications  Health Risk Assessment  Health Maintenance  Care Team         ** See Completed Assessments for Annual Wellness Visit within the encounter summary.**         The following assessments were completed:  Living Situation  CAGE  Depression Screening  Timed Get Up and Go  Whisper Test  Cognitive Function Screening    Nutrition Screening  ADL Screening  PAQ Screening        Vitals:    01/04/24 1101   BP: 122/70   Pulse: 80   Resp: 18   SpO2: 100%   Weight: 59.1 kg (130 lb 6.4 oz)   Height: 5' 4" (1.626 m)     Body mass index is 22.38 kg/m².  Physical Exam  Constitutional:       General: She is not in acute distress.     Appearance: Normal appearance. She is well-developed. She is not ill-appearing, toxic-appearing or diaphoretic.   HENT:      Head: Normocephalic and atraumatic.      Right Ear: External ear normal.      Left Ear: External ear normal.      Nose: Nose normal.      Mouth/Throat:      Mouth: Mucous membranes are moist.   Eyes:      General: No scleral icterus.        Right eye: No discharge.         Left eye: No discharge.      Conjunctiva/sclera: Conjunctivae normal.   Neck:      Thyroid: No thyromegaly.      Vascular: No carotid bruit.      Trachea: No tracheal deviation.   Cardiovascular:      Rate and Rhythm: Normal rate and regular rhythm.      Pulses: Normal pulses.      Heart sounds: Normal heart sounds. No murmur heard.     No friction rub. No gallop.   Pulmonary:      Effort: Pulmonary effort is normal. No respiratory distress.      Breath sounds: Normal breath sounds. No stridor. No wheezing, rhonchi or rales.   Chest:      Chest wall: No tenderness.   Abdominal:      General: Bowel sounds are normal. There is no distension.      Palpations: Abdomen is soft. There is no " mass.      Tenderness: There is no abdominal tenderness. There is no guarding or rebound.      Hernia: No hernia is present.   Musculoskeletal:         General: No tenderness. Normal range of motion.      Cervical back: Normal range of motion and neck supple. No edema or tenderness. Normal range of motion.   Lymphadenopathy:      Head:      Right side of head: No tonsillar adenopathy.      Left side of head: No tonsillar adenopathy.      Cervical: No cervical adenopathy.      Upper Body:      Right upper body: No supraclavicular adenopathy.      Left upper body: No supraclavicular adenopathy.   Skin:     General: Skin is warm and dry.      Findings: No lesion or rash.   Neurological:      Mental Status: She is alert and oriented to person, place, and time.      Deep Tendon Reflexes: Reflexes are normal and symmetric.   Psychiatric:         Mood and Affect: Mood normal.         Behavior: Behavior normal.               Diagnoses and health risks identified today and associated recommendations/orders:    1. Encounter for preventive health examination  Screenings performed, as noted above.  Personal preventative testing needs reviewed.      2. Encounter for Medicare annual wellness exam  Screenings performed, as noted above.  Personal preventative testing needs reviewed.    - Ambulatory Referral/Consult to Enhanced Annual Wellness Visit (eAWV)    3. Need for vaccination  Due for flu vaccine, will be given today  - Influenza - Quadrivalent (Adjuvanted)    4. Sacroiliitis  Treated with PT, is doing home PT herself, discussed the need to continue this, consistency over time will add to the success rate    5. Aortic atherosclerosis  Monitored, stable, on Repatha, follow up with pcp    6. Abnormality of gait and mobility  Monitored, stable, no recent falls, enc daily exercises    7. Primary hypertension  Treated with losartan, Metoprolol, Maxide, stable, cont tx    8. Mixed hyperlipidemia  Treated with Repatha, stable,  statin intolerant, cont tx    9. Smoking  Assessed, states smokes on occasion, declines cessation measures at this time, next LDCT 11/2024, normal results 2023    10. Major depressive disorder in full remission, unspecified whether recurrent  Treated with Celexa, states has been on for a long time, no concerns, cont tx    11. Age-related osteoporosis without current pathological fracture  Treated with Fosamax, stable, cont tx, next DEXA 2025    Review for Substance Use Disorders: patient does not use substances per chart    Review for opioid screening: Patient is not prescribed opioids     Had her flu shot today, discussed Covid, RSV, Shingrix vaccines, enc walking, discussing Vit D with her pcp, due for Cologurard in Dec 2024      Provided Emily with a 5-10 year written screening schedule and personal prevention plan. Recommendations were developed using the USPSTF age appropriate recommendations. Education, counseling, and referrals were provided as needed. After Visit Summary printed and given to patient which includes a list of additional screenings\tests needed.    No follow-ups on file.    Nilay Richard, CHRISTINE  I offered to discuss advanced care planning, including how to pick a person who would make decisions for you if you were unable to make them for yourself, called a health care power of , and what kind of decisions you might make such as use of life sustaining treatments such as ventilators and tube feeding when faced with a life limiting illness recorded on a living will that they will need to know. (How you want to be cared for as you near the end of your natural life)     X Patient is interested in learning more about how to make advanced directives.  I provided them paperwork and offered to discuss this with them.

## 2024-01-04 NOTE — PATIENT INSTRUCTIONS
Counseling and Referral of Other Preventative  (Italic type indicates deductible and co-insurance are waived)    Patient Name: Emily Ordaz  Today's Date: 1/4/2024    Health Maintenance       Date Due Completion Date    Shingles Vaccine (1 of 2) Never done ---    RSV Vaccine (Age 60+ and Pregnant patients) (1 - 1-dose 60+ series) Never done ---    COVID-19 Vaccine (3 - 2023-24 season) 09/01/2023 8/26/2021    Lipid Panel 10/04/2024 10/4/2023    LDCT Lung Screen 11/13/2024 11/13/2023    Mammogram 11/29/2024 11/29/2023    Colorectal Cancer Screening 12/09/2024 12/9/2021    DEXA Scan 09/22/2025 9/22/2022    TETANUS VACCINE 02/26/2028 2/26/2018        Orders Placed This Encounter   Procedures    Influenza - Quadrivalent (Adjuvanted)     The following information is provided to all patients.  This information is to help you find resources for any of the problems found today that may be affecting your health:                  Living healthy guide: www.[facility].[place].gov      Understanding Diabetes: www.diabetes.org      Eating healthy: www.cdc.gov/healthyweight      CDC home safety checklist: www.cdc.gov/steadi/patient.html      Agency on Aging: www.goea.[place].gov      Alcoholics anonymous (AA): www.aa.org      Physical Activity: www.francesca.nih.gov/ed8vloq      Tobacco use: www.quitwithusla.org

## 2024-02-20 DIAGNOSIS — M79.10 MYALGIA DUE TO HMG COA REDUCTASE INHIBITOR: ICD-10-CM

## 2024-02-20 DIAGNOSIS — E78.2 MIXED HYPERLIPIDEMIA: ICD-10-CM

## 2024-02-20 DIAGNOSIS — T46.6X5A MYALGIA DUE TO HMG COA REDUCTASE INHIBITOR: ICD-10-CM

## 2024-02-20 RX ORDER — EVOLOCUMAB 140 MG/ML
140 INJECTION, SOLUTION SUBCUTANEOUS
Qty: 2 ML | Refills: 2 | Status: ACTIVE | OUTPATIENT
Start: 2024-02-20 | End: 2024-05-14 | Stop reason: SDUPTHER

## 2024-04-08 ENCOUNTER — LAB VISIT (OUTPATIENT)
Dept: LAB | Facility: HOSPITAL | Age: 69
End: 2024-04-08
Attending: FAMILY MEDICINE
Payer: MEDICARE

## 2024-04-08 ENCOUNTER — OFFICE VISIT (OUTPATIENT)
Dept: FAMILY MEDICINE | Facility: CLINIC | Age: 69
End: 2024-04-08
Payer: MEDICARE

## 2024-04-08 VITALS
BODY MASS INDEX: 22.48 KG/M2 | SYSTOLIC BLOOD PRESSURE: 136 MMHG | OXYGEN SATURATION: 99 % | WEIGHT: 130.94 LBS | HEART RATE: 105 BPM | DIASTOLIC BLOOD PRESSURE: 70 MMHG

## 2024-04-08 DIAGNOSIS — R09.89 BRUIT OF RIGHT CAROTID ARTERY: ICD-10-CM

## 2024-04-08 DIAGNOSIS — F17.200 SMOKING: ICD-10-CM

## 2024-04-08 DIAGNOSIS — M81.0 AGE-RELATED OSTEOPOROSIS WITHOUT CURRENT PATHOLOGICAL FRACTURE: ICD-10-CM

## 2024-04-08 DIAGNOSIS — I10 PRIMARY HYPERTENSION: Primary | ICD-10-CM

## 2024-04-08 DIAGNOSIS — M79.10 MYALGIA DUE TO HMG COA REDUCTASE INHIBITOR: ICD-10-CM

## 2024-04-08 DIAGNOSIS — E78.2 MIXED HYPERLIPIDEMIA: ICD-10-CM

## 2024-04-08 DIAGNOSIS — F32.5 MAJOR DEPRESSIVE DISORDER IN FULL REMISSION, UNSPECIFIED WHETHER RECURRENT: ICD-10-CM

## 2024-04-08 DIAGNOSIS — T46.6X5A MYALGIA DUE TO HMG COA REDUCTASE INHIBITOR: ICD-10-CM

## 2024-04-08 DIAGNOSIS — I10 PRIMARY HYPERTENSION: ICD-10-CM

## 2024-04-08 LAB
ALBUMIN SERPL BCP-MCNC: 4.3 G/DL (ref 3.5–5.2)
ALP SERPL-CCNC: 83 U/L (ref 55–135)
ALT SERPL W/O P-5'-P-CCNC: 16 U/L (ref 10–44)
ANION GAP SERPL CALC-SCNC: 10 MMOL/L (ref 8–16)
AST SERPL-CCNC: 27 U/L (ref 10–40)
BASOPHILS # BLD AUTO: 0.04 K/UL (ref 0–0.2)
BASOPHILS NFR BLD: 0.6 % (ref 0–1.9)
BILIRUB SERPL-MCNC: 0.4 MG/DL (ref 0.1–1)
BUN SERPL-MCNC: 15 MG/DL (ref 8–23)
CALCIUM SERPL-MCNC: 10 MG/DL (ref 8.7–10.5)
CHLORIDE SERPL-SCNC: 104 MMOL/L (ref 95–110)
CHOLEST SERPL-MCNC: 164 MG/DL (ref 120–199)
CHOLEST/HDLC SERPL: 2.8 {RATIO} (ref 2–5)
CO2 SERPL-SCNC: 28 MMOL/L (ref 23–29)
CREAT SERPL-MCNC: 1 MG/DL (ref 0.5–1.4)
DIFFERENTIAL METHOD BLD: NORMAL
EOSINOPHIL # BLD AUTO: 0.3 K/UL (ref 0–0.5)
EOSINOPHIL NFR BLD: 3.9 % (ref 0–8)
ERYTHROCYTE [DISTWIDTH] IN BLOOD BY AUTOMATED COUNT: 13 % (ref 11.5–14.5)
EST. GFR  (NO RACE VARIABLE): >60 ML/MIN/1.73 M^2
ESTIMATED AVG GLUCOSE: 105 MG/DL (ref 68–131)
GLUCOSE SERPL-MCNC: 97 MG/DL (ref 70–110)
HBA1C MFR BLD: 5.3 % (ref 4–5.6)
HCT VFR BLD AUTO: 41 % (ref 37–48.5)
HDLC SERPL-MCNC: 59 MG/DL (ref 40–75)
HDLC SERPL: 36 % (ref 20–50)
HGB BLD-MCNC: 13.7 G/DL (ref 12–16)
IMM GRANULOCYTES # BLD AUTO: 0.01 K/UL (ref 0–0.04)
IMM GRANULOCYTES NFR BLD AUTO: 0.1 % (ref 0–0.5)
LDLC SERPL CALC-MCNC: 62.6 MG/DL (ref 63–159)
LYMPHOCYTES # BLD AUTO: 2.7 K/UL (ref 1–4.8)
LYMPHOCYTES NFR BLD: 40.6 % (ref 18–48)
MCH RBC QN AUTO: 30.6 PG (ref 27–31)
MCHC RBC AUTO-ENTMCNC: 33.4 G/DL (ref 32–36)
MCV RBC AUTO: 92 FL (ref 82–98)
MONOCYTES # BLD AUTO: 0.4 K/UL (ref 0.3–1)
MONOCYTES NFR BLD: 6.5 % (ref 4–15)
NEUTROPHILS # BLD AUTO: 3.2 K/UL (ref 1.8–7.7)
NEUTROPHILS NFR BLD: 48.3 % (ref 38–73)
NONHDLC SERPL-MCNC: 105 MG/DL
NRBC BLD-RTO: 0 /100 WBC
PLATELET # BLD AUTO: 252 K/UL (ref 150–450)
PMV BLD AUTO: 10.2 FL (ref 9.2–12.9)
POTASSIUM SERPL-SCNC: 4.3 MMOL/L (ref 3.5–5.1)
PROT SERPL-MCNC: 8 G/DL (ref 6–8.4)
RBC # BLD AUTO: 4.48 M/UL (ref 4–5.4)
SODIUM SERPL-SCNC: 142 MMOL/L (ref 136–145)
TRIGL SERPL-MCNC: 212 MG/DL (ref 30–150)
WBC # BLD AUTO: 6.72 K/UL (ref 3.9–12.7)

## 2024-04-08 PROCEDURE — 3078F DIAST BP <80 MM HG: CPT | Mod: CPTII,S$GLB,, | Performed by: FAMILY MEDICINE

## 2024-04-08 PROCEDURE — 80061 LIPID PANEL: CPT | Performed by: FAMILY MEDICINE

## 2024-04-08 PROCEDURE — 85025 COMPLETE CBC W/AUTO DIFF WBC: CPT | Performed by: FAMILY MEDICINE

## 2024-04-08 PROCEDURE — 99999 PR PBB SHADOW E&M-EST. PATIENT-LVL III: CPT | Mod: PBBFAC,,, | Performed by: FAMILY MEDICINE

## 2024-04-08 PROCEDURE — 3075F SYST BP GE 130 - 139MM HG: CPT | Mod: CPTII,S$GLB,, | Performed by: FAMILY MEDICINE

## 2024-04-08 PROCEDURE — 99214 OFFICE O/P EST MOD 30 MIN: CPT | Mod: S$GLB,,, | Performed by: FAMILY MEDICINE

## 2024-04-08 PROCEDURE — 4010F ACE/ARB THERAPY RXD/TAKEN: CPT | Mod: CPTII,S$GLB,, | Performed by: FAMILY MEDICINE

## 2024-04-08 PROCEDURE — 1159F MED LIST DOCD IN RCRD: CPT | Mod: CPTII,S$GLB,, | Performed by: FAMILY MEDICINE

## 2024-04-08 PROCEDURE — 83036 HEMOGLOBIN GLYCOSYLATED A1C: CPT | Mod: DBM | Performed by: FAMILY MEDICINE

## 2024-04-08 PROCEDURE — 3008F BODY MASS INDEX DOCD: CPT | Mod: CPTII,S$GLB,, | Performed by: FAMILY MEDICINE

## 2024-04-08 PROCEDURE — 80053 COMPREHEN METABOLIC PANEL: CPT | Performed by: FAMILY MEDICINE

## 2024-04-08 PROCEDURE — 36415 COLL VENOUS BLD VENIPUNCTURE: CPT | Mod: PO | Performed by: FAMILY MEDICINE

## 2024-04-08 NOTE — PROGRESS NOTES
Chief Complaint:    No chief complaint on file.      History of Present Illness:    Patient is here for six-month follow-up of chronic medical problems,   Home blood pressure readings are normal.    She is hyperlipidemia with statin intolerance on Repatha injections working well no side effects.  Still smoking but cutting back  Depression stable      ROS:  Review of Systems   Constitutional:  Negative for appetite change, chills and fever.   HENT:  Negative for congestion, ear pain, postnasal drip, rhinorrhea, sinus pressure and sinus pain.    Eyes:  Negative for pain.   Respiratory:  Negative for cough, chest tightness and shortness of breath.    Cardiovascular:  Negative for chest pain and palpitations.   Gastrointestinal:  Negative for abdominal pain, blood in stool, constipation, diarrhea and nausea.   Genitourinary:  Negative for difficulty urinating, dysuria, flank pain and hematuria.   Musculoskeletal:  Negative for arthralgias, back pain and myalgias.   Skin:  Negative for pallor and wound.   Neurological:  Negative for dizziness, tremors, speech difficulty, light-headedness and headaches.   Psychiatric/Behavioral:  Negative for behavioral problems, dysphoric mood and sleep disturbance.    All other systems reviewed and are negative.      Past Medical History:   Diagnosis Date    Arthritis     Hyperlipidemia     Hypertension     Osteoporosis        Social History:  Social History     Socioeconomic History    Marital status:    Tobacco Use    Smoking status: Some Days     Current packs/day: 0.50     Average packs/day: 0.5 packs/day for 45.0 years (22.5 ttl pk-yrs)     Types: Cigarettes    Smokeless tobacco: Never   Substance and Sexual Activity    Alcohol use: No     Alcohol/week: 0.0 standard drinks of alcohol    Drug use: No    Sexual activity: Never     Partners: Male     Birth control/protection: None     Social Determinants of Health     Financial Resource Strain: Low Risk  (1/4/2024)     Overall Financial Resource Strain (CARDIA)     Difficulty of Paying Living Expenses: Not hard at all   Food Insecurity: No Food Insecurity (1/4/2024)    Hunger Vital Sign     Worried About Running Out of Food in the Last Year: Never true     Ran Out of Food in the Last Year: Never true   Transportation Needs: No Transportation Needs (1/4/2024)    PRAPARE - Transportation     Lack of Transportation (Medical): No     Lack of Transportation (Non-Medical): No   Physical Activity: Inactive (1/4/2024)    Exercise Vital Sign     Days of Exercise per Week: 0 days     Minutes of Exercise per Session: 0 min   Stress: No Stress Concern Present (1/4/2024)    [employer] - Occupational Stress Questionnaire     Feeling of Stress : Only a little   Social Connections: Moderately Isolated (1/4/2024)    Social Connection and Isolation Panel [NHANES]     Frequency of Communication with Friends and Family: Three times a week     Frequency of Social Gatherings with Friends and Family: Three times a week     Attends Tenriism Services: Never     Active Member of Clubs or Organizations: No     Attends Club or Organization Meetings: Never     Marital Status: Living with partner   Housing Stability: Low Risk  (1/4/2024)    Housing Stability Vital Sign     Unable to Pay for Housing in the Last Year: No     Number of Places Lived in the Last Year: 1     Unstable Housing in the Last Year: No       Family History:   family history includes Breast cancer in her paternal aunt.    Health Maintenance   Topic Date Due    Shingles Vaccine (1 of 2) Never done    Lipid Panel  10/04/2024    LDCT Lung Screen  11/13/2024    Mammogram  11/29/2024    Colorectal Cancer Screening  12/09/2024    DEXA Scan  09/22/2025    TETANUS VACCINE  02/26/2028    Hepatitis C Screening  Completed       Physical Exam:    Vital Signs  Pulse: 105  SpO2: 99 %  BP: (!) 146/74  BP Location: Left arm  Patient Position: Sitting  Height and  Weight  Weight: 59.4 kg (130 lb 15.3 oz)]    Body mass index is 22.48 kg/m².    Physical Exam  Vitals and nursing note reviewed.   Constitutional:       Appearance: Normal appearance. She is not toxic-appearing.   HENT:      Head: Normocephalic and atraumatic.      Right Ear: Tympanic membrane normal.      Left Ear: Tympanic membrane normal.   Eyes:      Extraocular Movements: Extraocular movements intact.      Pupils: Pupils are equal, round, and reactive to light.   Neck:      Vascular: Carotid bruit present.   Cardiovascular:      Rate and Rhythm: Normal rate and regular rhythm.      Heart sounds: Normal heart sounds.   Pulmonary:      Effort: Pulmonary effort is normal.      Breath sounds: Normal breath sounds. No wheezing, rhonchi or rales.   Abdominal:      General: Bowel sounds are normal. There is no distension.      Palpations: Abdomen is soft.      Tenderness: There is no abdominal tenderness.   Musculoskeletal:         General: Normal range of motion.      Cervical back: Normal range of motion.      Lumbar back: No tenderness.   Skin:     General: Skin is warm and dry.      Capillary Refill: Capillary refill takes less than 2 seconds.   Neurological:      General: No focal deficit present.      Mental Status: She is alert and oriented to person, place, and time.   Psychiatric:         Mood and Affect: Mood normal.         Behavior: Behavior normal.         Judgment: Judgment normal.           Assessment:      ICD-10-CM ICD-9-CM   1. Primary hypertension  I10 401.9   2. Mixed hyperlipidemia  E78.2 272.2   3. 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 Age-related osteoporosis without current pathological fracture  M81.0 733.01   5. Major depressive disorder in full remission, unspecified whether recurrent  F32.5 296.26   6. Smoking  F17.200 305.1         Plan:       Doing good overall continue current meds and plan   Follow-up 6 months    Orders Placed This Encounter    Procedures    CBC Auto Differential    Comprehensive Metabolic Panel    Hemoglobin A1C    Lipid Panel     Current Outpatient Medications   Medication Sig Dispense Refill    ALA-HIST IR 2 mg Tab Take 1 tablet by mouth every morning.      alendronate (FOSAMAX) 70 MG tablet TAKE ONE TABLET BY MOUTH EVERY 7 DAYS 12 tablet 3    azelastine (ASTELIN) 137 mcg (0.1 %) nasal spray 1 spray 2 (two) times daily.      cholecalciferol, vitamin D3, (VITAMIN D3 ORAL) Take by mouth.      citalopram (CELEXA) 20 MG tablet TAKE ONE TABLET BY MOUTH EVERY DAY 90 tablet 2    evolocumab (REPATHA SURECLICK) 140 mg/mL PnIj Inject 1 mL (140 mg total) into the skin every 14 (fourteen) days. 2 mL 2    losartan (COZAAR) 100 MG tablet TAKE ONE TABLET BY MOUTH DAILY 90 tablet 3    meloxicam (MOBIC) 7.5 MG tablet Take 1 tablet (7.5 mg total) by mouth daily as needed for Pain. 30 tablet 0    metoprolol succinate (TOPROL-XL) 25 MG 24 hr tablet TAKE 1/2 TABLET BY MOUTH EVERY DAY 45 tablet 3    potassium chloride (MICRO-K) 10 MEQ CpSR TAKE TWO CAPSULES BY MOUTH EVERY  capsule 3    triamterene-hydrochlorothiazide 37.5-25 mg (MAXZIDE-25) 37.5-25 mg per tablet TAKE 1/2 TABLET BY MOUTH EVERY DAY 45 tablet 3     No current facility-administered medications for this visit.       There are no discontinued medications.      Follow up in about 6 months (around 10/8/2024).      Alonso Cortez MD  Scribe Attestation:   I, Justice Ji, am scribing for, and in the presence of, Dr.Arif Cortez I performed the above scribed service and the documentation accurately describes the services I performed. I attest to the accuracy of the note.    I, Dr. Alonso Cortez, reviewed documentation as scribed above. I performed the services described in this documentation.  I agree that the record reflects my personal performance and is accurate and complete. Alonso Cortez MD.  04/08/2024

## 2024-04-11 ENCOUNTER — APPOINTMENT (OUTPATIENT)
Dept: RADIOLOGY | Facility: HOSPITAL | Age: 69
End: 2024-04-11
Attending: FAMILY MEDICINE
Payer: MEDICARE

## 2024-04-11 DIAGNOSIS — R09.89 BRUIT OF RIGHT CAROTID ARTERY: ICD-10-CM

## 2024-04-11 PROCEDURE — 93880 EXTRACRANIAL BILAT STUDY: CPT | Mod: 26,,, | Performed by: RADIOLOGY

## 2024-04-11 PROCEDURE — 93880 EXTRACRANIAL BILAT STUDY: CPT | Mod: TC,PO

## 2024-04-12 ENCOUNTER — PATIENT MESSAGE (OUTPATIENT)
Dept: FAMILY MEDICINE | Facility: CLINIC | Age: 69
End: 2024-04-12
Payer: MEDICARE

## 2024-04-15 ENCOUNTER — PATIENT MESSAGE (OUTPATIENT)
Dept: FAMILY MEDICINE | Facility: CLINIC | Age: 69
End: 2024-04-15
Payer: MEDICARE

## 2024-04-15 ENCOUNTER — TELEPHONE (OUTPATIENT)
Dept: FAMILY MEDICINE | Facility: CLINIC | Age: 69
End: 2024-04-15
Payer: MEDICARE

## 2024-04-15 DIAGNOSIS — I77.9 RIGHT-SIDED CAROTID ARTERY DISEASE, UNSPECIFIED TYPE: Primary | ICD-10-CM

## 2024-04-17 DIAGNOSIS — I65.29 STENOSIS OF CAROTID ARTERY, UNSPECIFIED LATERALITY: Primary | ICD-10-CM

## 2024-04-17 DIAGNOSIS — R09.89 OTHER SPECIFIED SYMPTOMS AND SIGNS INVOLVING THE CIRCULATORY AND RESPIRATORY SYSTEMS: ICD-10-CM

## 2024-05-06 ENCOUNTER — INITIAL CONSULT (OUTPATIENT)
Dept: VASCULAR SURGERY | Facility: CLINIC | Age: 69
End: 2024-05-06
Payer: MEDICARE

## 2024-05-06 VITALS — SYSTOLIC BLOOD PRESSURE: 140 MMHG | DIASTOLIC BLOOD PRESSURE: 68 MMHG | HEART RATE: 77 BPM | OXYGEN SATURATION: 99 %

## 2024-05-06 DIAGNOSIS — I77.9 RIGHT-SIDED CAROTID ARTERY DISEASE, UNSPECIFIED TYPE: ICD-10-CM

## 2024-05-06 DIAGNOSIS — F17.200 SMOKING: ICD-10-CM

## 2024-05-06 DIAGNOSIS — I65.23 CAROTID STENOSIS, ASYMPTOMATIC, BILATERAL: Primary | ICD-10-CM

## 2024-05-06 DIAGNOSIS — E78.2 MIXED HYPERLIPIDEMIA: ICD-10-CM

## 2024-05-06 DIAGNOSIS — I10 PRIMARY HYPERTENSION: ICD-10-CM

## 2024-05-06 PROCEDURE — 99205 OFFICE O/P NEW HI 60 MIN: CPT | Mod: S$GLB,,, | Performed by: STUDENT IN AN ORGANIZED HEALTH CARE EDUCATION/TRAINING PROGRAM

## 2024-05-06 PROCEDURE — 99999 PR PBB SHADOW E&M-EST. PATIENT-LVL III: CPT | Mod: PBBFAC,,, | Performed by: STUDENT IN AN ORGANIZED HEALTH CARE EDUCATION/TRAINING PROGRAM

## 2024-05-06 NOTE — ASSESSMENT & PLAN NOTE
Patient has critical right ica stenosis. She is asymptomatic. I have started her on aspirin 81 mg otc. Will schedule her for right CEA with EEG monitoring but first need her to be evaluated by cardiology for preoperative risk stratification.

## 2024-05-06 NOTE — PROGRESS NOTES
Fanta Amos    OFFICE NOTE    DATE OF VISIT: 2024  PATIENT NAME: Emily Ordaz  : 1955  MRN: [id number]  PRIMARY CARE PHYSICIAN: Alonso Cortez MD  CARDIOLOGIST: none  REFERRING PROVIDER: Alonso Cortez MD    CHIEF COMPLAINT   Chief Complaint   Patient presents with    Carotid Artery Disease     Patient in for follow up carotid exam. Patient denies any symptoms at this time.       HISTORY OF PRESENT ILLNESS:  Emily Ordaz is a 68 y.o. female who presents to clinic today primary care physician heard bruit and ordered carotid duplex. She has critical stenosis of the right ica I estimate this to be at least 90% stenosed. She is fortunately asymptomatic from this. She is a smoker. She is on repatha for cholesterol. She does not take any anti platelet agent. She is otherwise moderately active. She is starting a health fitness routine. She does not see a cardiologist. She denies chest pain even with moderate activity    ALLERGIES:  Review of patient's allergies indicates:   Allergen Reactions    Statins-hmg-coa reductase inhibitors      Leg cramps and pain        PAST MEDICAL HISTORY:  Past Medical History:   Diagnosis Date    Arthritis     Hyperlipidemia     Hypertension     Osteoporosis        PAST SURGICAL HISTORY:  No past surgical history on file.    SOCIAL HISTORY:   Social History     Tobacco Use    Smoking status: Some Days     Current packs/day: 0.50     Average packs/day: 0.5 packs/day for 45.0 years (22.5 ttl pk-yrs)     Types: Cigarettes    Smokeless tobacco: Never   Substance Use Topics    Alcohol use: No     Alcohol/week: 0.0 standard drinks of alcohol    Drug use: No       FAMILY HISTORY:  Family History   Problem Relation Name Age of Onset    Breast cancer Paternal Aunt         REVIEW OF SYSTEMS:  ROS  10 pt ros otherwise negative    PHYSICAL EXAM:  Vitals:    24 1334   BP: (!) 140/68   Pulse: 77   SpO2: 99%      Physical Exam      Vss  Gen nad alert oriented  Cv rrr  Lung  ctab  Abd soft nt nd    VASCULAR LAB STUDIES:  Psv 400's right ica consistent with 80-99% stenosis  Left side mild-moderate left ica disease    ASSESSMENT AND PLAN:  HTN (hypertension)  Medical management    Hyperlipidemia  Medical management    Smoking  Encouraged to stop smoking immediately. She will try    Carotid stenosis, asymptomatic, bilateral  Patient has critical right ica stenosis. She is asymptomatic. I have started her on aspirin 81 mg otc. Will schedule her for right CEA with EEG monitoring but first need her to be evaluated by cardiology for preoperative risk stratification.      Emily was seen today for carotid artery disease.    Diagnoses and all orders for this visit:    Carotid stenosis, asymptomatic, bilateral    Right-sided carotid artery disease, unspecified type  -     Ambulatory referral/consult to Vascular Surgery    Primary hypertension    Mixed hyperlipidemia    Smoking        No follow-ups on file.        Fanta Amos  [facility]  Vascular Surgery  [phone] (Clinic Number)

## 2024-05-07 DIAGNOSIS — I10 PRIMARY HYPERTENSION: Primary | ICD-10-CM

## 2024-05-08 ENCOUNTER — OFFICE VISIT (OUTPATIENT)
Dept: CARDIOLOGY | Facility: CLINIC | Age: 69
End: 2024-05-08
Payer: MEDICARE

## 2024-05-08 ENCOUNTER — HOSPITAL ENCOUNTER (OUTPATIENT)
Dept: CARDIOLOGY | Facility: HOSPITAL | Age: 69
Discharge: HOME | End: 2024-05-08
Attending: INTERNAL MEDICINE
Payer: MEDICARE

## 2024-05-08 VITALS
HEIGHT: 64 IN | WEIGHT: 131.19 LBS | SYSTOLIC BLOOD PRESSURE: 124 MMHG | OXYGEN SATURATION: 97 % | HEART RATE: 87 BPM | BODY MASS INDEX: 22.4 KG/M2 | DIASTOLIC BLOOD PRESSURE: 80 MMHG

## 2024-05-08 DIAGNOSIS — Z01.810 PREOP CARDIOVASCULAR EXAM: Primary | ICD-10-CM

## 2024-05-08 DIAGNOSIS — F17.200 SMOKER: ICD-10-CM

## 2024-05-08 DIAGNOSIS — I10 PRIMARY HYPERTENSION: ICD-10-CM

## 2024-05-08 DIAGNOSIS — I65.21 STENOSIS OF RIGHT CAROTID ARTERY: ICD-10-CM

## 2024-05-08 PROCEDURE — 4010F ACE/ARB THERAPY RXD/TAKEN: CPT | Mod: CPTII,S$GLB,, | Performed by: INTERNAL MEDICINE

## 2024-05-08 PROCEDURE — 3008F BODY MASS INDEX DOCD: CPT | Mod: CPTII,S$GLB,, | Performed by: INTERNAL MEDICINE

## 2024-05-08 PROCEDURE — 93010 ELECTROCARDIOGRAM REPORT: CPT | Mod: ,,, | Performed by: INTERNAL MEDICINE

## 2024-05-08 PROCEDURE — 3079F DIAST BP 80-89 MM HG: CPT | Mod: CPTII,S$GLB,, | Performed by: INTERNAL MEDICINE

## 2024-05-08 PROCEDURE — 3044F HG A1C LEVEL LT 7.0%: CPT | Mod: CPTII,S$GLB,, | Performed by: INTERNAL MEDICINE

## 2024-05-08 PROCEDURE — 3074F SYST BP LT 130 MM HG: CPT | Mod: CPTII,S$GLB,, | Performed by: INTERNAL MEDICINE

## 2024-05-08 PROCEDURE — 3288F FALL RISK ASSESSMENT DOCD: CPT | Mod: CPTII,S$GLB,, | Performed by: INTERNAL MEDICINE

## 2024-05-08 PROCEDURE — 99999 PR PBB SHADOW E&M-EST. PATIENT-LVL III: CPT | Mod: PBBFAC,,, | Performed by: INTERNAL MEDICINE

## 2024-05-08 PROCEDURE — 1101F PT FALLS ASSESS-DOCD LE1/YR: CPT | Mod: CPTII,S$GLB,, | Performed by: INTERNAL MEDICINE

## 2024-05-08 PROCEDURE — 93005 ELECTROCARDIOGRAM TRACING: CPT

## 2024-05-08 PROCEDURE — 99204 OFFICE O/P NEW MOD 45 MIN: CPT | Mod: S$GLB,,, | Performed by: INTERNAL MEDICINE

## 2024-05-08 PROCEDURE — 1126F AMNT PAIN NOTED NONE PRSNT: CPT | Mod: CPTII,S$GLB,, | Performed by: INTERNAL MEDICINE

## 2024-05-08 PROCEDURE — 1159F MED LIST DOCD IN RCRD: CPT | Mod: CPTII,S$GLB,, | Performed by: INTERNAL MEDICINE

## 2024-05-08 RX ORDER — ASPIRIN 81 MG/1
81 TABLET ORAL DAILY
COMMUNITY

## 2024-05-08 NOTE — PROGRESS NOTES
Subjective:   Patient ID:  Emily Ordaz is a 68 y.o. female who presents for evaluation of No chief complaint on file.      HPI  68-year-old female smoker, hypertension hyperlipidemia.    Comes in for preop evaluation.    He is going for carotid artery endarterectomy.    She denies any chest pain, or significant dyspnea on exertion.    No lower extremity swelling.  No palpitations syncope or presyncope.    She has a acceptable functional status without angina.    I reviewed her CT scan of the chest done part of her lung screening and showed minimal to no coronary atherosclerosis.  Past Medical History:   Diagnosis Date    Arthritis     Hyperlipidemia     Hypertension     Osteoporosis        No past surgical history on file.    Social History     Tobacco Use    Smoking status: Some Days     Current packs/day: 0.50     Average packs/day: 0.5 packs/day for 45.0 years (22.5 ttl pk-yrs)     Types: Cigarettes    Smokeless tobacco: Never   Substance Use Topics    Alcohol use: No     Alcohol/week: 0.0 standard drinks of alcohol    Drug use: No       Family History   Problem Relation Name Age of Onset    Breast cancer Paternal Aunt         Review of Systems   Cardiovascular:  Negative for chest pain, dyspnea on exertion, palpitations and syncope.   Genitourinary: Negative.    Neurological: Negative.        Current Outpatient Medications on File Prior to Visit   Medication Sig    ALA-HIST IR 2 mg Tab Take 1 tablet by mouth every morning.    alendronate (FOSAMAX) 70 MG tablet TAKE ONE TABLET BY MOUTH EVERY 7 DAYS    aspirin (ECOTRIN) 81 MG EC tablet Take 81 mg by mouth once daily.    citalopram (CELEXA) 20 MG tablet TAKE ONE TABLET BY MOUTH EVERY DAY    evolocumab (REPATHA SURECLICK) 140 mg/mL PnIj Inject 1 mL (140 mg total) into the skin every 14 (fourteen) days.    losartan (COZAAR) 100 MG tablet TAKE ONE TABLET BY MOUTH DAILY    metoprolol succinate (TOPROL-XL) 25 MG 24 hr tablet TAKE 1/2 TABLET BY MOUTH EVERY DAY     potassium chloride (MICRO-K) 10 MEQ CpSR TAKE TWO CAPSULES BY MOUTH EVERY DAY    triamterene-hydrochlorothiazide 37.5-25 mg (MAXZIDE-25) 37.5-25 mg per tablet TAKE 1/2 TABLET BY MOUTH EVERY DAY    azelastine (ASTELIN) 137 mcg (0.1 %) nasal spray 1 spray 2 (two) times daily. (Patient not taking: Reported on 5/8/2024)    cholecalciferol, vitamin D3, (VITAMIN D3 ORAL) Take by mouth. (Patient not taking: Reported on 5/8/2024)    meloxicam (MOBIC) 7.5 MG tablet Take 1 tablet (7.5 mg total) by mouth daily as needed for Pain. (Patient not taking: Reported on 5/8/2024)     No current facility-administered medications on file prior to visit.       Objective:   Objective:  Wt Readings from Last 3 Encounters:   05/08/24 59.5 kg (131 lb 2.8 oz)   04/08/24 59.4 kg (130 lb 15.3 oz)   01/04/24 59.1 kg (130 lb 6.4 oz)     Temp Readings from Last 3 Encounters:   10/19/22 97.5 °F (36.4 °C) (Temporal)   06/29/22 97.7 °F (36.5 °C) (Temporal)   05/25/22 97.9 °F (36.6 °C) (Temporal)     BP Readings from Last 3 Encounters:   05/08/24 124/80   05/06/24 (!) 140/68   04/08/24 136/70     Pulse Readings from Last 3 Encounters:   05/08/24 87   05/06/24 77   04/08/24 105       Physical Exam  Vitals reviewed.   Constitutional:       Appearance: She is well-developed.   Neck:      Vascular: No carotid bruit.   Cardiovascular:      Rate and Rhythm: Normal rate and regular rhythm.      Pulses: Intact distal pulses.      Heart sounds: Normal heart sounds. No murmur heard.  Pulmonary:      Breath sounds: Normal breath sounds.   Neurological:      Mental Status: She is oriented to person, place, and time.         Lab Results   Component Value Date    CHOL 164 04/08/2024    CHOL 123 10/04/2023    CHOL 269 (H) 07/17/2023     Lab Results   Component Value Date    HDL 59 04/08/2024    HDL 51 10/04/2023    HDL 51 07/17/2023     Lab Results   Component Value Date    LDLCALC 62.6 (L) 04/08/2024    LDLCALC 28.4 (L) 10/04/2023    LDLCALC 153.2 07/17/2023  "    Lab Results   Component Value Date    TRIG 212 (H) 04/08/2024    TRIG 218 (H) 10/04/2023    TRIG 324 (H) 07/17/2023     Lab Results   Component Value Date    CHOLHDL 36.0 04/08/2024    CHOLHDL 41.5 10/04/2023    CHOLHDL 19.0 (L) 07/17/2023       Chemistry        Component Value Date/Time     04/08/2024 1020    K 4.3 04/08/2024 1020     04/08/2024 1020    CO2 28 04/08/2024 1020    BUN 15 04/08/2024 1020    CREATININE 1.0 04/08/2024 1020    GLU 97 04/08/2024 1020        Component Value Date/Time    CALCIUM 10.0 04/08/2024 1020    ALKPHOS 83 04/08/2024 1020    AST 27 04/08/2024 1020    ALT 16 04/08/2024 1020    BILITOT 0.4 04/08/2024 1020    ESTGFRAFRICA >60.0 01/31/2022 0918    EGFRNONAA >60.0 01/31/2022 0918          Lab Results   Component Value Date    TSH 3.6 05/30/2008     No results found for: "INR", "PROTIME"  Lab Results   Component Value Date    WBC 6.72 04/08/2024    HGB 13.7 04/08/2024    HCT 41.0 04/08/2024    MCV 92 04/08/2024     04/08/2024     BNP  @LABRCNTIP(BNP,BNPTRIAGEBLO)@  CrCl cannot be calculated (Patient's most recent lab result is older than the maximum 7 days allowed.).     Imaging:  ======    No results found for this or any previous visit.    Results for orders placed in visit on 04/11/24    US Carotid Bilateral    Narrative  EXAMINATION:  US CAROTID BILATERAL    CLINICAL HISTORY:  Other specified symptoms and signs involving the circulatory and respiratory systems    TECHNIQUE:  Grayscale and color Doppler ultrasound examination of the carotid and vertebral artery systems bilaterally.  Stenosis estimates are per the NASCET measurement criteria.    COMPARISON:  None.    FINDINGS:  Right:    Internal Carotid Artery (ICA):    Peak systolic velocity 460 cm/sec    End diastolic velocity 91 cm/sec    ICA/CCA peak systolic ratio: 5.7    ICA/CCA end diastolic ratio: 4.7    Plaque formation: Large amount of homogeneous plaque noted at the carotid bulb and proximal " ICA    Vertebral artery: Antegrade flow and normal waveform.    Left:    Internal Carotid Artery (ICA):    Peak systolic velocity 167 cm/sec    End diastolic velocity 44 cm/sec    ICA/CCA peak systolic ratio: 1.6    ICA/CCA end diastolic ratio: 1.6    Plaque formation: Moderate amount of homogeneous plaque present at the carotid bulb and proximal ICA.    Vertebral artery: Antegrade flow and normal waveform.    Impression  Atherosclerosis with elevated velocities suggesting greater than 70% ICA stenosis on the right and 50-69% stenosis on the lef..      Electronically signed by: Emiliano Ayoub MD  Date:    04/11/2024  Time:    10:15    No results found for this or any previous visit.    No results found for this or any previous visit.    No valid procedures specified.    No results found for this or any previous visit.      No results found for this or any previous visit.      No results found for this or any previous visit.      Diagnostic Results:  ECG: Reviewed    The 10-year ASCVD risk score (Belkis ESTRADA, et al., 2019) is: 12.2%    Values used to calculate the score:      Age: 68 years      Sex: Female      Is Non- : No      Diabetic: No      Tobacco smoker: Yes      Systolic Blood Pressure: 124 mmHg      Is BP treated: Yes      HDL Cholesterol: 59 mg/dL      Total Cholesterol: 164 mg/dL        Assessment and Plan:   Preop cardiovascular exam    Primary hypertension    Smoker    Stenosis of right carotid artery      Normal EKG nonischemic.    Functional status is acceptable.  Angina free.    Reviewed her CT scan of the chest with non to minimal coronary atherosclerosis she does have however aortic atherosclerosis and carotid atherosclerosis.  Continue with metoprolol perioperatively.    Okay to proceed with her carotid endarterectomy.  Stable from cardiac standpoint  Reviewed all tests and above medical conditions with patient in detail and formulated treatment plan.  Risk factor  modification discussed.   Cardiac low salt diet discussed.  Maintaining healthy weight and weight loss goals were discussed in clinic.  Smoking cessation  Continue with aspirin  Follow up in  6 months

## 2024-05-09 LAB
OHS QRS DURATION: 76 MS
OHS QTC CALCULATION: 419 MS

## 2024-05-14 DIAGNOSIS — E78.2 MIXED HYPERLIPIDEMIA: ICD-10-CM

## 2024-05-14 DIAGNOSIS — T46.6X5A MYALGIA DUE TO HMG COA REDUCTASE INHIBITOR: ICD-10-CM

## 2024-05-14 DIAGNOSIS — M79.10 MYALGIA DUE TO HMG COA REDUCTASE INHIBITOR: ICD-10-CM

## 2024-05-14 RX ORDER — EVOLOCUMAB 140 MG/ML
140 INJECTION, SOLUTION SUBCUTANEOUS
Qty: 2 ML | Refills: 2 | Status: ACTIVE | OUTPATIENT
Start: 2024-05-14 | End: 2024-08-12

## 2024-05-20 ENCOUNTER — TELEPHONE (OUTPATIENT)
Dept: PREADMISSION TESTING | Facility: HOSPITAL | Age: 69
End: 2024-05-20
Payer: MEDICARE

## 2024-05-20 NOTE — TELEPHONE ENCOUNTER
Spoke with Dr. Amos's nurse, Sara, regarding pt's pre op labs for their procedure on 5/30/24. Per Sara, lab orders will be placed by Dr. Amos's NP and Pt will be contacted to schedule lab appointment.

## 2024-05-23 ENCOUNTER — LAB VISIT (OUTPATIENT)
Dept: LAB | Facility: HOSPITAL | Age: 69
End: 2024-05-23
Attending: ANESTHESIOLOGY
Payer: MEDICARE

## 2024-05-23 DIAGNOSIS — Z01.818 PREOP TESTING: ICD-10-CM

## 2024-05-23 LAB
ANION GAP SERPL CALC-SCNC: 9 MMOL/L (ref 8–16)
BASOPHILS # BLD AUTO: 0.04 K/UL (ref 0–0.2)
BASOPHILS NFR BLD: 0.6 % (ref 0–1.9)
BUN SERPL-MCNC: 17 MG/DL (ref 8–23)
CALCIUM SERPL-MCNC: 9.6 MG/DL (ref 8.7–10.5)
CHLORIDE SERPL-SCNC: 103 MMOL/L (ref 95–110)
CO2 SERPL-SCNC: 29 MMOL/L (ref 23–29)
CREAT SERPL-MCNC: 1 MG/DL (ref 0.5–1.4)
DIFFERENTIAL METHOD BLD: NORMAL
EOSINOPHIL # BLD AUTO: 0.3 K/UL (ref 0–0.5)
EOSINOPHIL NFR BLD: 5.5 % (ref 0–8)
ERYTHROCYTE [DISTWIDTH] IN BLOOD BY AUTOMATED COUNT: 12.9 % (ref 11.5–14.5)
EST. GFR  (NO RACE VARIABLE): >60 ML/MIN/1.73 M^2
GLUCOSE SERPL-MCNC: 133 MG/DL (ref 70–110)
HCT VFR BLD AUTO: 37.1 % (ref 37–48.5)
HGB BLD-MCNC: 12.8 G/DL (ref 12–16)
IMM GRANULOCYTES # BLD AUTO: 0.02 K/UL (ref 0–0.04)
IMM GRANULOCYTES NFR BLD AUTO: 0.3 % (ref 0–0.5)
LYMPHOCYTES # BLD AUTO: 3 K/UL (ref 1–4.8)
LYMPHOCYTES NFR BLD: 48 % (ref 18–48)
MCH RBC QN AUTO: 30.2 PG (ref 27–31)
MCHC RBC AUTO-ENTMCNC: 34.5 G/DL (ref 32–36)
MCV RBC AUTO: 88 FL (ref 82–98)
MONOCYTES # BLD AUTO: 0.4 K/UL (ref 0.3–1)
MONOCYTES NFR BLD: 6.9 % (ref 4–15)
NEUTROPHILS # BLD AUTO: 2.4 K/UL (ref 1.8–7.7)
NEUTROPHILS NFR BLD: 38.7 % (ref 38–73)
NRBC BLD-RTO: 0 /100 WBC
PLATELET # BLD AUTO: 207 K/UL (ref 150–450)
PMV BLD AUTO: 9.4 FL (ref 9.2–12.9)
POTASSIUM SERPL-SCNC: 4.4 MMOL/L (ref 3.5–5.1)
RBC # BLD AUTO: 4.24 M/UL (ref 4–5.4)
SODIUM SERPL-SCNC: 141 MMOL/L (ref 136–145)
WBC # BLD AUTO: 6.19 K/UL (ref 3.9–12.7)

## 2024-05-23 PROCEDURE — 36415 COLL VENOUS BLD VENIPUNCTURE: CPT | Mod: HCNC | Performed by: ANESTHESIOLOGY

## 2024-05-23 PROCEDURE — 85025 COMPLETE CBC W/AUTO DIFF WBC: CPT | Mod: HCNC | Performed by: ANESTHESIOLOGY

## 2024-05-23 PROCEDURE — 80048 BASIC METABOLIC PNL TOTAL CA: CPT | Mod: HCNC | Performed by: ANESTHESIOLOGY

## 2024-05-23 NOTE — PRE-PROCEDURE INSTRUCTIONS
Pre op instructions reviewed with patient over telephone, verbalized understanding.    To confirm, Surgery is scheduled on 5/30/24. We will call you late afternoon the business day prior to surgery with your arrival time.    *Please report to the [facility] (1st Floor) located off of [facility] (2nd Entrance/Building on the left, in front of the flag pole).  Address: [address] [place] [address] [place]. [address]      INSTRUCTIONS IMPORTANT!!!  DO NOT Eat, Drink, or Smoke after 12 midnight unless instructed otherwise by your Surgeon. OK to brush teeth, no gum, candy or mints!    >>>MEDICATION INSTRUCTIONS<<<: Morning of Surgery, take small sip of water with ONLY these medications:  Metoprolol    *Blood Thinners: Stop taking Aspirin the morning of surgery. Per your Physician Instructions. Call your Surgeon office to inquire about any questions regarding your blood thinner medication.    Ozempic/ Mounjaro/ Wegovy/ Trulicity/ Semaglutide injections or weight loss medication to be stopped 7 days prior to surgery.    !!!STOP ALL Aspirins, NSAIDS, WEIGHT LOSS INJECTIONS/PILLS, Herbal supplements, & Vitamins 7 DAYS BEFORE SURGERY!!!    ____  Avoid Alcoholic beverages 3 days prior to surgery, as it can thin the blood.  ____  NO Acrylic/fake nails or nail polish worn day of surgery (specifically hand/arm & foot surgeries).  ____  NO powder, lotions, deodorants, oils or cream on body.  ____  Remove all jewelry & piercings & foreign objects before arrival & leave at home.  ____  Remove Dentures, Hearing Aids & Contact Lens prior to surgery.  ____  Bring photo ID and insurance information to hospital (Leave Valuables at Home).  ____  If going home the same day, arrange for a ride home. You will not be able to drive for 24 hrs if Anesthesia was used.   ____  Females (ages 11-60): may need to give a urine sample the morning of surgery; please see Pre op Nurse prior to using the restroom.  ____  Wear clean, loose  fitting clothing to allow for dressings/ bandages.      Bathing Instructions:    -Shower with anti-bacterial Soap (Hibiclens or Dial) the night before surgery and the morning of.   -Do not use Hibiclens on your face or genitals.   -Apply clean clothes after shower.  -Do not shave your face or body 2 days prior to surgery unless instructed otherwise by your Surgeon.    [facility]/Ride Policy:  Only 2 adults allowed in pre op/recovery area during your procedure. You MUST HAVE A RIDE HOME from a responsible adult that you know and trust. Medical Transport, Uber or Lyft can ONLY be used if patient has a responsible adult to accompany them during ride home.       *Signs and symptoms of Infection Before or After Surgery:               !!!If you experience any fever, chills, nausea/ vomiting, foul odor/ excessive drainage from surgical site, flu-like symptoms, new wounds or cuts, PLEASE CALL THE SURGEON OFFICE at [phone] or SEND MESSAGE THROUGH [facility]!!!     *If you are running late the morning of surgery, please call the Hospital Surgery Dept @ [phone].     *Billing questions:  [phone]     Thank you,  -[facility] Pre Admit Dept.  [phone]   or [phone]  M-F 7:30 am-4:00 pm (Closed Major Holidays)

## 2024-05-29 ENCOUNTER — TELEPHONE (OUTPATIENT)
Dept: PREADMISSION TESTING | Facility: HOSPITAL | Age: 69
End: 2024-05-29
Payer: MEDICARE

## 2024-05-30 ENCOUNTER — ANESTHESIA EVENT (OUTPATIENT)
Dept: SURGERY | Facility: HOSPITAL | Age: 69
DRG: 039 | End: 2024-05-30
Payer: MEDICARE

## 2024-05-30 ENCOUNTER — ANESTHESIA (OUTPATIENT)
Dept: SURGERY | Facility: HOSPITAL | Age: 69
DRG: 039 | End: 2024-05-30
Payer: MEDICARE

## 2024-05-30 ENCOUNTER — HOSPITAL ENCOUNTER (INPATIENT)
Facility: HOSPITAL | Age: 69
LOS: 1 days | Discharge: HOME | DRG: 039 | End: 2024-05-31
Attending: STUDENT IN AN ORGANIZED HEALTH CARE EDUCATION/TRAINING PROGRAM | Admitting: STUDENT IN AN ORGANIZED HEALTH CARE EDUCATION/TRAINING PROGRAM
Payer: MEDICARE

## 2024-05-30 DIAGNOSIS — Z01.818 PREOP TESTING: ICD-10-CM

## 2024-05-30 DIAGNOSIS — Z98.890 S/P CAROTID ENDARTERECTOMY: Primary | ICD-10-CM

## 2024-05-30 LAB — POCT GLUCOSE: 153 MG/DL (ref 70–110)

## 2024-05-30 PROCEDURE — 03CK0ZZ EXTIRPATION OF MATTER FROM RIGHT INTERNAL CAROTID ARTERY, OPEN APPROACH: ICD-10-PCS | Performed by: STUDENT IN AN ORGANIZED HEALTH CARE EDUCATION/TRAINING PROGRAM

## 2024-05-30 PROCEDURE — C1751 CATH, INF, PER/CENT/MIDLINE: HCPCS | Mod: HCNC | Performed by: STUDENT IN AN ORGANIZED HEALTH CARE EDUCATION/TRAINING PROGRAM

## 2024-05-30 PROCEDURE — 63600175 PHARM REV CODE 636 W HCPCS: Mod: HCNC | Performed by: NURSE ANESTHETIST, CERTIFIED REGISTERED

## 2024-05-30 PROCEDURE — 63600175 PHARM REV CODE 636 W HCPCS: Mod: HCNC | Performed by: STUDENT IN AN ORGANIZED HEALTH CARE EDUCATION/TRAINING PROGRAM

## 2024-05-30 PROCEDURE — 25000003 PHARM REV CODE 250: Mod: HCNC | Performed by: NURSE PRACTITIONER

## 2024-05-30 PROCEDURE — 36000707: Mod: HCNC | Performed by: STUDENT IN AN ORGANIZED HEALTH CARE EDUCATION/TRAINING PROGRAM

## 2024-05-30 PROCEDURE — 37000009 HC ANESTHESIA EA ADD 15 MINS: Mod: HCNC | Performed by: STUDENT IN AN ORGANIZED HEALTH CARE EDUCATION/TRAINING PROGRAM

## 2024-05-30 PROCEDURE — 11000001 HC ACUTE MED/SURG PRIVATE ROOM: Mod: HCNC

## 2024-05-30 PROCEDURE — 27201423 OPTIME MED/SURG SUP & DEVICES STERILE SUPPLY: Mod: HCNC | Performed by: STUDENT IN AN ORGANIZED HEALTH CARE EDUCATION/TRAINING PROGRAM

## 2024-05-30 PROCEDURE — 25000003 PHARM REV CODE 250: Mod: HCNC | Performed by: STUDENT IN AN ORGANIZED HEALTH CARE EDUCATION/TRAINING PROGRAM

## 2024-05-30 PROCEDURE — 71000033 HC RECOVERY, INTIAL HOUR: Mod: HCNC | Performed by: STUDENT IN AN ORGANIZED HEALTH CARE EDUCATION/TRAINING PROGRAM

## 2024-05-30 PROCEDURE — 35301 RECHANNELING OF ARTERY: CPT | Mod: AS,HCNC,RT, | Performed by: PHYSICIAN ASSISTANT

## 2024-05-30 PROCEDURE — 36000706: Mod: HCNC | Performed by: STUDENT IN AN ORGANIZED HEALTH CARE EDUCATION/TRAINING PROGRAM

## 2024-05-30 PROCEDURE — 71000039 HC RECOVERY, EACH ADD'L HOUR: Mod: HCNC | Performed by: STUDENT IN AN ORGANIZED HEALTH CARE EDUCATION/TRAINING PROGRAM

## 2024-05-30 PROCEDURE — 27100025 HC TUBING, SET FLUID WARMER: Mod: HCNC | Performed by: STUDENT IN AN ORGANIZED HEALTH CARE EDUCATION/TRAINING PROGRAM

## 2024-05-30 PROCEDURE — C1768 GRAFT, VASCULAR: HCPCS | Mod: HCNC | Performed by: STUDENT IN AN ORGANIZED HEALTH CARE EDUCATION/TRAINING PROGRAM

## 2024-05-30 PROCEDURE — 63600175 PHARM REV CODE 636 W HCPCS: Mod: JZ,JG,HCNC | Performed by: STUDENT IN AN ORGANIZED HEALTH CARE EDUCATION/TRAINING PROGRAM

## 2024-05-30 PROCEDURE — 37000008 HC ANESTHESIA 1ST 15 MINUTES: Mod: HCNC | Performed by: STUDENT IN AN ORGANIZED HEALTH CARE EDUCATION/TRAINING PROGRAM

## 2024-05-30 PROCEDURE — 35301 RECHANNELING OF ARTERY: CPT | Mod: HCNC,RT,, | Performed by: STUDENT IN AN ORGANIZED HEALTH CARE EDUCATION/TRAINING PROGRAM

## 2024-05-30 PROCEDURE — 25000003 PHARM REV CODE 250: Mod: HCNC | Performed by: NURSE ANESTHETIST, CERTIFIED REGISTERED

## 2024-05-30 PROCEDURE — 27800903 OPTIME MED/SURG SUP & DEVICES OTHER IMPLANTS: Mod: HCNC | Performed by: STUDENT IN AN ORGANIZED HEALTH CARE EDUCATION/TRAINING PROGRAM

## 2024-05-30 PROCEDURE — 03UK0KZ SUPPLEMENT RIGHT INTERNAL CAROTID ARTERY WITH NONAUTOLOGOUS TISSUE SUBSTITUTE, OPEN APPROACH: ICD-10-PCS | Performed by: STUDENT IN AN ORGANIZED HEALTH CARE EDUCATION/TRAINING PROGRAM

## 2024-05-30 PROCEDURE — 21400001 HC TELEMETRY ROOM: Mod: HCNC

## 2024-05-30 PROCEDURE — 25000003 PHARM REV CODE 250: Mod: HCNC | Performed by: PHYSICIAN ASSISTANT

## 2024-05-30 PROCEDURE — 27200677 HC TRANSDUCER MONITOR KIT SINGLE: Mod: HCNC | Performed by: STUDENT IN AN ORGANIZED HEALTH CARE EDUCATION/TRAINING PROGRAM

## 2024-05-30 DEVICE — VASCU-GUARD IS PREPARED FROM BOVINE PERICARDIUM CROSS-LINKED WITH GLUTARALDEHYDE, AND TREATED WITH 1 MOLAR SODIUM HYDROXIDE FOR A MINIMUM OF 60 MINUTES AT 20-25 DEGREES C. VASCU-GUARD IS TERMINALLY STERILIZED USING GAMMA IRRADIATION. AND PACKAGED WITHIN A DOUBLE-POUCH SYSTEM. THE CONTENTS OF THE UNOPENED, UNDAMAGED OUTER POUCH ARE STERILE.
Type: IMPLANTABLE DEVICE | Site: CAROTID | Status: FUNCTIONAL
Brand: VASCU-GUARD

## 2024-05-30 RX ORDER — OXYCODONE AND ACETAMINOPHEN 5; 325 MG/1; MG/1
1 TABLET ORAL
Status: DISCONTINUED | OUTPATIENT
Start: 2024-05-30 | End: 2024-05-30 | Stop reason: HOSPADM

## 2024-05-30 RX ORDER — PROPOFOL 10 MG/ML
VIAL (ML) INTRAVENOUS
Status: DISCONTINUED | OUTPATIENT
Start: 2024-05-30 | End: 2024-05-30

## 2024-05-30 RX ORDER — TRIAMTERENE AND HYDROCHLOROTHIAZIDE 37.5; 25 MG/1; MG/1
1 CAPSULE ORAL DAILY
Status: DISCONTINUED | OUTPATIENT
Start: 2024-05-31 | End: 2024-05-31 | Stop reason: HOSPADM

## 2024-05-30 RX ORDER — HYDROMORPHONE HYDROCHLORIDE 2 MG/ML
0.2 INJECTION, SOLUTION INTRAMUSCULAR; INTRAVENOUS; SUBCUTANEOUS EVERY 5 MIN PRN
Status: DISCONTINUED | OUTPATIENT
Start: 2024-05-30 | End: 2024-05-30 | Stop reason: HOSPADM

## 2024-05-30 RX ORDER — CITALOPRAM 20 MG/1
20 TABLET, FILM COATED ORAL NIGHTLY
Status: DISCONTINUED | OUTPATIENT
Start: 2024-05-30 | End: 2024-05-31 | Stop reason: HOSPADM

## 2024-05-30 RX ORDER — ONDANSETRON 4 MG/1
4 TABLET, ORALLY DISINTEGRATING ORAL EVERY 6 HOURS PRN
Status: DISCONTINUED | OUTPATIENT
Start: 2024-05-30 | End: 2024-05-31 | Stop reason: HOSPADM

## 2024-05-30 RX ORDER — ROCURONIUM BROMIDE 10 MG/ML
INJECTION, SOLUTION INTRAVENOUS
Status: DISCONTINUED | OUTPATIENT
Start: 2024-05-30 | End: 2024-05-30

## 2024-05-30 RX ORDER — HEPARIN SODIUM 1000 [USP'U]/ML
INJECTION, SOLUTION INTRAVENOUS; SUBCUTANEOUS
Status: DISCONTINUED | OUTPATIENT
Start: 2024-05-30 | End: 2024-05-30

## 2024-05-30 RX ORDER — HYDROCODONE BITARTRATE AND ACETAMINOPHEN 7.5; 325 MG/1; MG/1
1 TABLET ORAL EVERY 6 HOURS PRN
Status: DISCONTINUED | OUTPATIENT
Start: 2024-05-30 | End: 2024-05-31 | Stop reason: HOSPADM

## 2024-05-30 RX ORDER — HYDROCODONE BITARTRATE AND ACETAMINOPHEN 10; 325 MG/1; MG/1
1 TABLET ORAL EVERY 4 HOURS PRN
Status: DISCONTINUED | OUTPATIENT
Start: 2024-05-30 | End: 2024-05-31 | Stop reason: HOSPADM

## 2024-05-30 RX ORDER — CEFAZOLIN SODIUM 1 G/3ML
INJECTION, POWDER, FOR SOLUTION INTRAMUSCULAR; INTRAVENOUS
Status: DISCONTINUED | OUTPATIENT
Start: 2024-05-30 | End: 2024-05-30

## 2024-05-30 RX ORDER — HEPARIN SODIUM 1000 [USP'U]/ML
INJECTION, SOLUTION INTRAVENOUS; SUBCUTANEOUS
Status: DISCONTINUED | OUTPATIENT
Start: 2024-05-30 | End: 2024-05-30 | Stop reason: HOSPADM

## 2024-05-30 RX ORDER — NITROGLYCERIN 5 MG/ML
INJECTION, SOLUTION INTRAVENOUS
Status: DISCONTINUED | OUTPATIENT
Start: 2024-05-30 | End: 2024-05-30

## 2024-05-30 RX ORDER — CEFAZOLIN SODIUM 1 G/3ML
INJECTION, POWDER, FOR SOLUTION INTRAMUSCULAR; INTRAVENOUS
Status: DISCONTINUED | OUTPATIENT
Start: 2024-05-30 | End: 2024-05-30 | Stop reason: HOSPADM

## 2024-05-30 RX ORDER — PROCHLORPERAZINE EDISYLATE 5 MG/ML
5 INJECTION INTRAMUSCULAR; INTRAVENOUS ONCE
Status: COMPLETED | OUTPATIENT
Start: 2024-05-30 | End: 2024-05-30

## 2024-05-30 RX ORDER — LIDOCAINE HYDROCHLORIDE 10 MG/ML
INJECTION, SOLUTION EPIDURAL; INFILTRATION; INTRACAUDAL; PERINEURAL
Status: DISCONTINUED | OUTPATIENT
Start: 2024-05-30 | End: 2024-05-30 | Stop reason: HOSPADM

## 2024-05-30 RX ORDER — ONDANSETRON HYDROCHLORIDE 2 MG/ML
4 INJECTION, SOLUTION INTRAVENOUS DAILY PRN
Status: DISCONTINUED | OUTPATIENT
Start: 2024-05-30 | End: 2024-05-30 | Stop reason: HOSPADM

## 2024-05-30 RX ORDER — ACETAMINOPHEN 10 MG/ML
1000 INJECTION, SOLUTION INTRAVENOUS ONCE
Status: COMPLETED | OUTPATIENT
Start: 2024-05-30 | End: 2024-05-30

## 2024-05-30 RX ORDER — DEXAMETHASONE SODIUM PHOSPHATE 4 MG/ML
INJECTION, SOLUTION INTRA-ARTICULAR; INTRALESIONAL; INTRAMUSCULAR; INTRAVENOUS; SOFT TISSUE
Status: DISCONTINUED | OUTPATIENT
Start: 2024-05-30 | End: 2024-05-30

## 2024-05-30 RX ORDER — POTASSIUM CHLORIDE 750 MG/1
20 TABLET, EXTENDED RELEASE ORAL DAILY
Status: DISCONTINUED | OUTPATIENT
Start: 2024-05-31 | End: 2024-05-31 | Stop reason: HOSPADM

## 2024-05-30 RX ORDER — ONDANSETRON HYDROCHLORIDE 2 MG/ML
INJECTION, SOLUTION INTRAVENOUS
Status: DISCONTINUED | OUTPATIENT
Start: 2024-05-30 | End: 2024-05-30

## 2024-05-30 RX ORDER — ENALAPRILAT 1.25 MG/ML
1.25 INJECTION INTRAVENOUS EVERY 6 HOURS PRN
Status: DISCONTINUED | OUTPATIENT
Start: 2024-05-30 | End: 2024-05-31 | Stop reason: HOSPADM

## 2024-05-30 RX ORDER — PROTAMINE SULFATE 10 MG/ML
INJECTION, SOLUTION INTRAVENOUS
Status: DISCONTINUED | OUTPATIENT
Start: 2024-05-30 | End: 2024-05-30

## 2024-05-30 RX ORDER — CHLORHEXIDINE GLUCONATE ORAL RINSE 1.2 MG/ML
10 SOLUTION DENTAL 2 TIMES DAILY
Status: DISCONTINUED | OUTPATIENT
Start: 2024-05-30 | End: 2024-05-31 | Stop reason: HOSPADM

## 2024-05-30 RX ORDER — PROMETHAZINE HYDROCHLORIDE 12.5 MG/1
12.5 TABLET ORAL EVERY 6 HOURS PRN
Status: DISCONTINUED | OUTPATIENT
Start: 2024-05-30 | End: 2024-05-31 | Stop reason: HOSPADM

## 2024-05-30 RX ORDER — LOSARTAN POTASSIUM 50 MG/1
100 TABLET ORAL DAILY
Status: DISCONTINUED | OUTPATIENT
Start: 2024-05-31 | End: 2024-05-31 | Stop reason: HOSPADM

## 2024-05-30 RX ORDER — ASPIRIN 81 MG/1
81 TABLET ORAL DAILY
Status: DISCONTINUED | OUTPATIENT
Start: 2024-05-31 | End: 2024-05-31 | Stop reason: HOSPADM

## 2024-05-30 RX ORDER — FENTANYL CITRATE 50 UG/ML
INJECTION, SOLUTION INTRAMUSCULAR; INTRAVENOUS
Status: DISCONTINUED | OUTPATIENT
Start: 2024-05-30 | End: 2024-05-30

## 2024-05-30 RX ORDER — HYDROCODONE BITARTRATE AND ACETAMINOPHEN 5; 325 MG/1; MG/1
1 TABLET ORAL EVERY 6 HOURS PRN
Status: DISCONTINUED | OUTPATIENT
Start: 2024-05-30 | End: 2024-05-31 | Stop reason: HOSPADM

## 2024-05-30 RX ADMIN — SODIUM CHLORIDE, SODIUM LACTATE, POTASSIUM CHLORIDE, AND CALCIUM CHLORIDE: .6; .31; .03; .02 INJECTION, SOLUTION INTRAVENOUS at 01:05

## 2024-05-30 RX ADMIN — FENTANYL CITRATE 100 MCG: 50 INJECTION, SOLUTION INTRAMUSCULAR; INTRAVENOUS at 01:05

## 2024-05-30 RX ADMIN — NITROGLYCERIN 20 MCG: 5 INJECTION, SOLUTION INTRAVENOUS at 03:05

## 2024-05-30 RX ADMIN — LIDOCAINE HYDROCHLORIDE 20 ML: 10; .005 INJECTION, SOLUTION EPIDURAL; INFILTRATION; INTRACAUDAL; PERINEURAL at 02:05

## 2024-05-30 RX ADMIN — CEFAZOLIN 2 G: 330 INJECTION, POWDER, FOR SOLUTION INTRAMUSCULAR; INTRAVENOUS at 01:05

## 2024-05-30 RX ADMIN — DEXAMETHASONE SODIUM PHOSPHATE 8 MG: 4 INJECTION, SOLUTION INTRA-ARTICULAR; INTRALESIONAL; INTRAMUSCULAR; INTRAVENOUS; SOFT TISSUE at 02:05

## 2024-05-30 RX ADMIN — PHENYLEPHRINE HYDROCHLORIDE 20 MCG/MIN: 10 INJECTION INTRAVENOUS at 01:05

## 2024-05-30 RX ADMIN — PROTAMINE SULFATE 20 MG: 10 INJECTION, SOLUTION INTRAVENOUS at 03:05

## 2024-05-30 RX ADMIN — CITALOPRAM HYDROBROMIDE 20 MG: 20 TABLET ORAL at 08:05

## 2024-05-30 RX ADMIN — NITROGLYCERIN 10 MCG: 5 INJECTION, SOLUTION INTRAVENOUS at 03:05

## 2024-05-30 RX ADMIN — ACETAMINOPHEN 1000 MG: 10 INJECTION INTRAVENOUS at 04:05

## 2024-05-30 RX ADMIN — ONDANSETRON 4 MG: 2 INJECTION INTRAMUSCULAR; INTRAVENOUS at 03:05

## 2024-05-30 RX ADMIN — CHLORHEXIDINE GLUCONATE 0.12% ORAL RINSE 10 ML: 1.2 LIQUID ORAL at 08:05

## 2024-05-30 RX ADMIN — HEPARIN SODIUM 6500 UNITS: 1000 INJECTION, SOLUTION INTRAVENOUS; SUBCUTANEOUS at 02:05

## 2024-05-30 RX ADMIN — PROPOFOL 100 MG: 10 INJECTION, EMULSION INTRAVENOUS at 01:05

## 2024-05-30 RX ADMIN — ONDANSETRON 4 MG: 2 INJECTION INTRAMUSCULAR; INTRAVENOUS at 04:05

## 2024-05-30 RX ADMIN — PROCHLORPERAZINE EDISYLATE 5 MG: 5 INJECTION INTRAMUSCULAR; INTRAVENOUS at 04:05

## 2024-05-30 RX ADMIN — GLYCOPYRROLATE 0.2 MG: 0.2 INJECTION, SOLUTION INTRAMUSCULAR; INTRAVENOUS at 02:05

## 2024-05-30 RX ADMIN — LIDOCAINE HYDROCHLORIDE 60 ML: 10 SOLUTION INTRAVENOUS at 01:05

## 2024-05-30 RX ADMIN — ROCURONIUM BROMIDE 50 MG: 10 INJECTION, SOLUTION INTRAVENOUS at 01:05

## 2024-05-30 NOTE — H&P
PATIENT NAME: Emily Ordaz  : 1955  MRN: [id number]  PRIMARY CARE PHYSICIAN: Alonso Cortez MD  CARDIOLOGIST: none  REFERRING PROVIDER: Alonso Cortez MD     CHIEF COMPLAINT        Chief Complaint   Patient presents with    Carotid Artery Disease       Patient in for follow up carotid exam. Patient denies any symptoms at this time.         HISTORY OF PRESENT ILLNESS:  Emily Ordaz is a 68 y.o. female who presents to clinic today primary care physician heard bruit and ordered carotid duplex. She has critical stenosis of the right ica I estimate this to be at least 90% stenosed. She is fortunately asymptomatic from this. She is a smoker. She is on repatha for cholesterol. She does not take any anti platelet agent. She is otherwise moderately active. She is starting a health fitness routine. She does not see a cardiologist. She denies chest pain even with moderate activity     ALLERGIES:        Review of patient's allergies indicates:   Allergen Reactions    Statins-hmg-coa reductase inhibitors         Leg cramps and pain          PAST MEDICAL HISTORY:       Past Medical History:   Diagnosis Date    Arthritis      Hyperlipidemia      Hypertension      Osteoporosis           PAST SURGICAL HISTORY:  No past surgical history on file.     SOCIAL HISTORY:   Social History   Social History            Tobacco Use    Smoking status: Some Days       Current packs/day: 0.50       Average packs/day: 0.5 packs/day for 45.0 years (22.5 ttl pk-yrs)       Types: Cigarettes    Smokeless tobacco: Never   Substance Use Topics    Alcohol use: No       Alcohol/week: 0.0 standard drinks of alcohol    Drug use: No            FAMILY HISTORY:         Family History   Problem Relation Name Age of Onset    Breast cancer Paternal Aunt             REVIEW OF SYSTEMS:  ROS  10 pt ros otherwise negative     PHYSICAL EXAM:      Vitals:     24 1334   BP: (!) 140/68   Pulse: 77   SpO2: 99%      Physical Exam   Vss  Gen nad  alert oriented  Cv rrr  Lung ctab  Abd soft nt nd     VASCULAR LAB STUDIES:  Psv 400's right ica consistent with 80-99% stenosis  Left side mild-moderate left ica disease     ASSESSMENT AND PLAN:  HTN (hypertension)  Medical management     Hyperlipidemia  Medical management     Smoking  Encouraged to stop smoking immediately. She will try     Carotid stenosis, asymptomatic, bilateral  Patient has critical right ica stenosis. She is asymptomatic. I have started her on aspirin 81 mg otc. Will schedule her for right CEA with EEG monitoring but first need her to be evaluated by cardiology for preoperative risk stratification.        Emily was seen today for carotid artery disease.     She has been cleared by dr garcia       Diagnoses and all orders for this visit:     Carotid stenosis, asymptomatic, bilateral     Right-sided carotid artery disease, unspecified type  -     Ambulatory referral/consult to Vascular Surgery     Primary hypertension     Mixed hyperlipidemia

## 2024-05-30 NOTE — OR NURSING
Push pull to upper extremities stronger on right side. Lower extremities push pull equally strong. Smile equal. Speech clear. No facial drooping. Noted prior to OR arrival

## 2024-05-30 NOTE — ANESTHESIA POSTPROCEDURE EVALUATION
Anesthesia Post Evaluation    Patient: Emily Ordaz    Procedure(s) Performed: Procedure(s) (LRB):  ENDARTERECTOMY, CAROTID (Right)    Final Anesthesia Type: general      Patient location during evaluation: PACU  Patient participation: Yes- Able to Participate  Level of consciousness: awake and alert and oriented  Post-procedure vital signs: reviewed and stable  Pain management: adequate  Airway patency: patent  AUDREY mitigation strategies: Verification of full reversal of neuromuscular block  PONV status at discharge: No PONV  Anesthetic complications: no      Cardiovascular status: blood pressure returned to baseline and hemodynamically stable  Respiratory status: unassisted  Hydration status: euvolemic  Follow-up not needed.              Vitals Value Taken Time   /64 05/30/24 1646   Temp 36.3 °C (97.4 °F) 05/30/24 1550   Pulse 69 05/30/24 1654   Resp 17 05/30/24 1654   SpO2 94 % 05/30/24 1654   Vitals shown include unfiled device data.      No case tracking events are documented in the log.      Pain/Maryann Score: Pain Rating Prior to Med Admin: 6 (5/30/2024  4:34 PM)  Maryann Score: 9 (5/30/2024  4:15 PM)

## 2024-05-30 NOTE — TRANSFER OF CARE
"Anesthesia Transfer of Care Note    Patient: Emily Ordaz    Procedure(s) Performed: Procedure(s) (LRB):  ENDARTERECTOMY, CAROTID (Right)    Patient location: PACU    Anesthesia Type: general    Transport from OR: Transported from OR on room air with adequate spontaneous ventilation    Post pain: adequate analgesia    Post assessment: no apparent anesthetic complications and tolerated procedure well    Post vital signs: stable    Level of consciousness: responds to stimulation and sedated    Nausea/Vomiting: no nausea/vomiting    Complications: none    Transfer of care protocol was followedComments: Report given to PACU RN at bedside. Hand off tool used. RN given opportunity to ask questions or clarify concerns. No Concerns verbalized. RN was asked if ready to assume care of patient. RN verbally confirmed. Pt. left in stable condition. SV. Vital Signs Return to Near Baseline. No s/s of distress noted.     Last vitals: Visit Vitals  BP (!) 155/70   Pulse 71   Temp 36.4 °C (97.5 °F) (Temporal)   Resp 18   Ht 5' 4" (1.626 m)   Wt 58 kg (127 lb 12.1 oz)   LMP 03/16/1997 (LMP Unknown)   SpO2 99%   Breastfeeding No   BMI 21.93 kg/m²     "

## 2024-05-30 NOTE — ANESTHESIA PROCEDURE NOTES
Peripheral IV Insertion    Diagnosis: I99.8 Other disorder of circulatory system    Patient location during procedure: OR  Timeout: 5/30/2024 1:35 PM  Procedure end time: 5/30/2024 1:39 PM    Staffing  Authorizing Provider: Richard Matson MD  Performing Provider: Álvaro Bass CRNA    Staffing  Performed by: Álvaro Bass CRNA  Authorized by: Richard Matson MD      Preanesthetic Checklist  Completed: patient identified, IV checked, site marked, risks and benefits discussed, surgical consent, monitors and equipment checked, pre-op evaluation, timeout performed and anesthesia consent givenPeripheral IV Insertion  Skin Prep: chlorhexidine gluconate  Local Infiltration: none  Orientation: right  Location: hand  Catheter Type: peripheral IV (single lumen)  Catheter Size: 20 G  Catheter placement by Anatomical landmarks. Heme positive aspiration all ports. Insertion Attempts: 1  Assessment  Dressing: secured with tape and tegaderm  Patient: Tolerated well  Line flushed easily.

## 2024-05-30 NOTE — ANESTHESIA PROCEDURE NOTES
Arterial    Diagnosis: bp monitoring    Patient location during procedure: done in OR  Timeout: 5/30/2024 1:30 PM  Procedure end time: 5/30/2024 1:35 PM    Staffing  Authorizing Provider: Richard Matson MD  Performing Provider: Álvaro Bass CRNA    Staffing  Performed by: Álvaro Bass CRNA  Authorized by: Richard Matson MD    Anesthesiologist was present at the time of the procedure.    Preanesthetic Checklist  Completed: patient identified, IV checked, site marked, risks and benefits discussed, surgical consent, monitors and equipment checked, pre-op evaluation, timeout performed and anesthesia consent givenArterial  Skin Prep: chlorhexidine gluconate  Local Infiltration: none  Orientation: right  Location: radial    Catheter Size: 20 G  Catheter placement by Ultrasound guidance. Heme positive aspiration all ports.   Vessel Caliber: patent  Needle advanced into vessel with real time Ultrasound guidance.Insertion Attempts: 1  Assessment  Dressing: secured with tape and tegaderm

## 2024-05-30 NOTE — ANESTHESIA PROCEDURE NOTES
Intubation    Date/Time: 5/30/2024 1:29 PM    Performed by: Álvaro Bass CRNA  Authorized by: Richard Matson MD    Intubation:     Induction:  Intravenous    Intubated:  Postinduction    Mask Ventilation:  Easy with oral airway    Attempts:  1    Attempted By:  CRNA and student    Method of Intubation:  Direct and video laryngoscopy    Blade:  Bailey 3    Laryngeal View Grade: Grade I - full view of cords      Difficult Airway Encountered?: No      Complications:  None    Airway Device:  Oral endotracheal tube    Airway Device Size:  7.0    Style/Cuff Inflation:  Cuffed (inflated to minimal occlusive pressure)    Tube secured:  21    Secured at:  The lips    Placement Verified By:  Capnometry    Complicating Factors:  None    Findings Post-Intubation:  BS equal bilateral  Notes:      Lips and mucosa intact

## 2024-05-30 NOTE — OP NOTE
Date of surgery 5/30/2024    Pre operative diagnosis: Right carotid artery stenosis    Post operative diagnosis: Right carotid artery stenosis    Surgeon: Dr. Amos    Assistant: Mervat Rosales PA-C, skilled hands were essential to the the procedure as there was no available resident in the facility to assist.       Procedure: Right carotid endarterectomy with bovine patch angioplasty and intraoperative EEG monitoring    Operation:   Patient was taken to the operating room and placed on the table in a supine fashion.  The Right neck and chest were prepped and draped in the standard fashion.  A standard Right carotid exposure was performed.  The platysma was divided.  The sternocleidomastoid muscle was identified and retracted laterally.  This exposed the Rightinternal jugular vein where the facial vein was identified and divided between silk ties.  This exposed the carotid bifurcation.    Control of the Right common carotid artery was obtained with an umbilical tape and a Yesi tourniquet.  Next I dissected up to the bifurcation and then up the internal carotid artery past the level of obvious disease.  I skeletonized the internal carotid artery at this level so that a distal clamp could be placed the appropriate time.  Control of the origin of the external carotid artery was obtained with a Silastic vessel loop.  The patient was systemically heparinized.    A distal followed by proximal clamp was placed.  A longitudinal arteriotomy was created extending from the common carotid artery to the bifurcation an then up the internal carotid artery past the level of disease.There were no EEG changes so no shunt was used  .  An endarterectomy plane was established in the common carotid artery and transected at the proximal extent of the arteriotomy.  Using an elevator the endarterectomy plane was extended to the bifurcation where an eversion technique was used on the external carotid artery.  Lastly, I extended  the endarterectomy plane up the internal carotid artery past the level of disease where a nice distal margin was obtained.  Heparinized saline solution was irrigated across the endarterectomy surface and all loose particulate matter was removed.    A bovine pericardial patch was used to close the arteriotomy site with 6-0 Prolene suture.  Just prior to completing the anastomosis all vessels were flushed.  Once complete, flow was reestablished first up the external then up the internal carotid artery.  Meticulous hemostasis was obtained. No drain was placed.  Protamine was given for heparin reversal.  The wound was closed in 2 layers.    Post-op Plan:  Will be admitted to the hospital for observation, medical management and pain control      Fanta Amos  5/30/2024  3:47 PM

## 2024-05-30 NOTE — ANESTHESIA PREPROCEDURE EVALUATION
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    Smoking    Pain in left knee    Decreased strength    Left hip pain    Sacroiliitis    Aortic atherosclerosis    Major depression in full remission    Age-related osteoporosis without current pathological fracture    Carotid stenosis, asymptomatic, bilateral     Past Medical History:   Diagnosis Date    Arthritis     Hyperlipidemia     Hypertension     Osteoporosis      Past Surgical History:   Procedure Laterality Date    BILATERAL TUBAL LIGATION Bilateral 1994    ORIF FINGER FRACTURE Left 1987       US Carotid Bilateral     Narrative  EXAMINATION:  US CAROTID BILATERAL     CLINICAL HISTORY:  Other specified symptoms and signs involving the circulatory and respiratory systems     TECHNIQUE:  Grayscale and color Doppler ultrasound examination of the carotid and vertebral artery systems bilaterally.  Stenosis estimates are per the NASCET measurement criteria.     COMPARISON:  None.     FINDINGS:  Right:     Internal Carotid Artery (ICA):     Peak systolic velocity 460 cm/sec     End diastolic velocity 91 cm/sec     ICA/CCA peak systolic ratio: 5.7     ICA/CCA end diastolic ratio: 4.7     Plaque formation: Large amount of homogeneous plaque noted at the carotid bulb and proximal ICA     Vertebral artery: Antegrade flow and normal waveform.     Left:     Internal Carotid Artery (ICA):     Peak systolic velocity 167 cm/sec     End diastolic velocity 44 cm/sec     ICA/CCA peak systolic ratio: 1.6     ICA/CCA end diastolic ratio: 1.6     Plaque formation: Moderate amount of homogeneous plaque present at the carotid bulb and proximal ICA.     Vertebral artery: Antegrade flow and normal waveform.      Impression  Atherosclerosis with elevated velocities suggesting greater than 70% ICA stenosis on the right and 50-69% stenosis on the lef..        Electronically signed by:Emiliano Ayoub MD  Date:                                        04/11/2024  Time:                                                    10:15     No results found for this or any previous visit.     No results found for this or any previous visit.     No valid procedures specified.     No results found for this or any previous visit.        No results found for this or any previous visit.        No results found for this or any previous visit.    Pre-op Assessment    I have reviewed the Patient Summary Reports.    I have reviewed the NPO Status.   I have reviewed the Medications.     Review of Systems  Anesthesia Hx:  No problems with previous Anesthesia   History of prior surgery of interest to airway management or planning:  Previous anesthesia: General          Denies Personal Hx of Anesthesia complications.                    Social:  Smoker 20+ pack/yrs      Hematology/Oncology:  Hematology Normal   Oncology Normal                                   Cardiovascular:     Hypertension           hyperlipidemia   ECG has been reviewed. Normal sinus rhythm   Low voltage QRS   Borderline Abnormal ECG   No previous ECGs available   Confirmed by IZABELA SANTOS MD ([id number]) on 5/9/2024 2:10:33 PM     Cleared by cards.                         Pulmonary:  Pulmonary Normal                       Renal/:  Renal/ Normal                 Musculoskeletal:  Musculoskeletal Normal                Neurological:  Neurology Normal                                      Endocrine:  Endocrine Normal    Bmi 22      Denies Obesity / BMI > 30  Psych:    depression                Physical Exam  General: Well nourished, Cooperative, Alert and Oriented    Airway:  Mallampati: II   Mouth Opening: Normal  TM Distance: Normal  Tongue: Normal  Neck ROM: Normal  ROM    Dental:  Intact, Dentures  Patient denies any currently loose or chipped teeth      Anesthesia Plan  Type of Anesthesia, risks & benefits discussed:    Anesthesia Type: Gen ETT  Intra-op Monitoring Plan: Standard ASA Monitors and Art Line  Post Op Pain Control Plan: multimodal analgesia  Induction:  IV  Airway Plan: Direct, Post-Induction  Informed Consent: Informed consent signed with the Patient and all parties understand the risks and agree with anesthesia plan.  All questions answered.   ASA Score: 2  Day of Surgery Review of History & Physical: H&P Update referred to the surgeon/provider.    Ready For Surgery From Anesthesia Perspective.     .      Chemistry        Component Value Date/Time     05/23/2024 1523    K 4.4 05/23/2024 1523     05/23/2024 1523    CO2 29 05/23/2024 1523    BUN 17 05/23/2024 1523    CREATININE 1.0 05/23/2024 1523     (H) 05/23/2024 1523        Component Value Date/Time    CALCIUM 9.6 05/23/2024 1523    ALKPHOS 83 04/08/2024 1020    AST 27 04/08/2024 1020    ALT 16 04/08/2024 1020    BILITOT 0.4 04/08/2024 1020    ESTGFRAFRICA >60.0 01/31/2022 0918    EGFRNONAA >60.0 01/31/2022 0918        Lab Results   Component Value Date    WBC 6.19 05/23/2024    HGB 12.8 05/23/2024    HCT 37.1 05/23/2024    MCV 88 05/23/2024     05/23/2024

## 2024-05-31 VITALS
SYSTOLIC BLOOD PRESSURE: 149 MMHG | OXYGEN SATURATION: 98 % | HEIGHT: 64 IN | WEIGHT: 127.75 LBS | TEMPERATURE: 98 F | DIASTOLIC BLOOD PRESSURE: 70 MMHG | HEART RATE: 95 BPM | BODY MASS INDEX: 21.81 KG/M2 | RESPIRATION RATE: 16 BRPM

## 2024-05-31 PROCEDURE — 25000003 PHARM REV CODE 250: Mod: HCNC | Performed by: NURSE PRACTITIONER

## 2024-05-31 PROCEDURE — 25000003 PHARM REV CODE 250: Mod: HCNC | Performed by: PHYSICIAN ASSISTANT

## 2024-05-31 PROCEDURE — 94761 N-INVAS EAR/PLS OXIMETRY MLT: CPT | Mod: HCNC

## 2024-05-31 PROCEDURE — 99900035 HC TECH TIME PER 15 MIN (STAT): Mod: HCNC

## 2024-05-31 PROCEDURE — 94799 UNLISTED PULMONARY SVC/PX: CPT | Mod: HCNC

## 2024-05-31 RX ORDER — HYDROCODONE BITARTRATE AND ACETAMINOPHEN 5; 325 MG/1; MG/1
1 TABLET ORAL EVERY 6 HOURS PRN
Qty: 10 TABLET | Refills: 0 | Status: SHIPPED | OUTPATIENT
Start: 2024-05-31

## 2024-05-31 RX ADMIN — ASPIRIN 81 MG: 81 TABLET, COATED ORAL at 09:05

## 2024-05-31 RX ADMIN — POTASSIUM CHLORIDE 20 MEQ: 750 TABLET, FILM COATED, EXTENDED RELEASE ORAL at 09:05

## 2024-05-31 RX ADMIN — TRIAMTERENE AND HYDROCHLOROTHIAZIDE 1 CAPSULE: 37.5; 25 CAPSULE ORAL at 09:05

## 2024-05-31 RX ADMIN — METOPROLOL SUCCINATE 12.5 MG: 25 TABLET, EXTENDED RELEASE ORAL at 09:05

## 2024-05-31 RX ADMIN — CHLORHEXIDINE GLUCONATE 0.12% ORAL RINSE 10 ML: 1.2 LIQUID ORAL at 09:05

## 2024-05-31 RX ADMIN — LOSARTAN POTASSIUM 100 MG: 50 TABLET, FILM COATED ORAL at 09:05

## 2024-05-31 NOTE — ASSESSMENT & PLAN NOTE
Chronic, controlled. Latest blood pressure and vitals reviewed-     Temp:  [97.4 °F (36.3 °C)-97.7 °F (36.5 °C)]   Pulse:  [65-89]   Resp:  [12-24]   BP: (102-155)/(49-92)   SpO2:  [92 %-100 %]   Arterial Line BP: (102-157)/(37-87) .   Home meds for hypertension were reviewed and noted below.   Hypertension Medications               losartan (COZAAR) 100 MG tablet TAKE ONE TABLET BY MOUTH DAILY    metoprolol succinate (TOPROL-XL) 25 MG 24 hr tablet TAKE 1/2 TABLET BY MOUTH EVERY DAY    triamterene-hydrochlorothiazide 37.5-25 mg (MAXZIDE-25) 37.5-25 mg per tablet TAKE 1/2 TABLET BY MOUTH EVERY DAY            While in the hospital, will manage blood pressure as follows; Continue home antihypertensive regimen    Will utilize p.r.n. blood pressure medication only if patient's blood pressure greater than 160/100 and she develops symptoms such as worsening chest pain or shortness of breath.

## 2024-05-31 NOTE — SUBJECTIVE & OBJECTIVE
Interval History:     No acute events overnight   Resting comfortably   Hemodynamically stable   Labs reviewed   Pain controlled on current regimen  Surgery site looks intact, no redness  Discharge today per primary/vascular rehab    Review of Systems      Constitutional:  Negative for chills, diaphoresis, fatigue and fever.   Respiratory:  Negative for cough and shortness of breath.    Cardiovascular:  Negative for chest pain and palpitations.   Gastrointestinal:  Negative for diarrhea, nausea and vomiting.   Musculoskeletal:  Positive for neck pain. Negative for neck stiffness.       Objective:     Vital Signs (Most Recent):  Temp: 97.7 °F (36.5 °C) (05/31/24 0737)  Pulse: 95 (05/31/24 0810)  Resp: 16 (05/31/24 0737)  BP: (!) 149/70 (05/31/24 0737)  SpO2: 98 % (05/31/24 0737) Vital Signs (24h Range):  Temp:  [97.4 °F (36.3 °C)-97.7 °F (36.5 °C)] 97.7 °F (36.5 °C)  Pulse:  [65-95] 95  Resp:  [12-24] 16  SpO2:  [92 %-100 %] 98 %  BP: (102-168)/(49-92) 149/70  Arterial Line BP: (102-157)/(37-87) 157/58     Weight: 58 kg (127 lb 12.1 oz)  Body mass index is 21.93 kg/m².  No intake or output data in the 24 hours ending 05/31/24 0939      Physical Exam      Constitutional:       General: She is awake. She is not in acute distress.     Appearance: Normal appearance. She is well-developed and well-groomed. She is not ill-appearing, toxic-appearing or diaphoretic.   HENT:      Head: Normocephalic and atraumatic.   Eyes:      Extraocular Movements: Extraocular movements intact.      Conjunctiva/sclera: Conjunctivae normal.   Neck:        Comments: Surgical dressing clean, dry and intact.   Cardiovascular:      Rate and Rhythm: Normal rate and regular rhythm.      Heart sounds: Normal heart sounds. No murmur heard.  Pulmonary:      Effort: Pulmonary effort is normal.      Breath sounds: Normal breath sounds. No decreased breath sounds, wheezing, rhonchi or rales.   Abdominal:      General: Bowel sounds are normal.       "Palpations: Abdomen is soft.      Tenderness: There is no abdominal tenderness.   Musculoskeletal:      Cervical back: Normal range of motion and neck supple.      Right lower leg: No edema.      Left lower leg: No edema.      Comments: 5/5 strength throughout   Skin:     General: Skin is warm and dry.      Capillary Refill: Capillary refill takes less than 2 seconds.   Neurological:      General: No focal deficit present.      Mental Status: She is alert and oriented to person, place, and time. Mental status is at baseline.      GCS: GCS eye subscore is 4. GCS verbal subscore is 5. GCS motor subscore is 6.      Cranial Nerves: Cranial nerves 2-12 are intact.      Sensory: Sensation is intact.      Motor: Motor function is intact.   Psychiatric:         Mood and Affect: Mood normal.         Speech: Speech normal.         Behavior: Behavior normal. Behavior is cooperative.   Significant Labs: All pertinent labs within the past 24 hours have been reviewed.  CBC: No results for input(s): "WBC", "HGB", "HCT", "PLT" in the last 48 hours.  CMP: No results for input(s): "NA", "K", "CL", "CO2", "GLU", "BUN", "CREATININE", "CALCIUM", "PROT", "ALBUMIN", "BILITOT", "ALKPHOS", "AST", "ALT", "ANIONGAP", "EGFRNONAA" in the last 48 hours.    Invalid input(s): "ESTGFAFRICA"    Significant Imaging:        "

## 2024-05-31 NOTE — ASSESSMENT & PLAN NOTE
Chronic. Stable. Not in acute exacerbation and currently denies endorsing any suicidal/homicidal ideations.   Plan:  -Continue home medications (celexa)

## 2024-05-31 NOTE — PLAN OF CARE
[facility] - Med Surg  Discharge Final Note    Primary Care Provider: Alonso Cortez MD    Expected Discharge Date: 5/31/2024    Final Discharge Note (most recent)       Final Note - 05/31/24 0928          Final Note    Assessment Type Final Discharge Note     Anticipated Discharge Disposition Home or Self Care        Post-Acute Status    Discharge Delays None known at this time                     Important Message from Medicare             Contact Info       Fanta Amos MD   Specialty: Vascular Surgery    [address] [place] [id number]  [facility] [address]   Phone: [phone]       Next Steps: Schedule an appointment as soon as possible for a visit in 3 week(s)    Instructions: For wound re-check, Post op visit          Discharge home, no home health or dme orders noted.    Patient will schedule own [facility].

## 2024-05-31 NOTE — CONSULTS
[facility] Medicine  Consult Note    Patient Name: Emily Ordaz  MRN: [id number]  Admission Date: 5/30/2024  Hospital Length of Stay: 0 days  Attending Physician: Fanta Amos MD   Primary Care Provider: Alonso Cortez MD           Patient information was obtained from patient, past medical records, and ER records.     Consults  Subjective:     Principal Problem: S/P carotid endarterectomy    Chief Complaint: No chief complaint on file.       HPI: Emily Ordaz is a 68 y.o. female with a PMH  has a past medical history of Arthritis, Hyperlipidemia, Hypertension, and Osteoporosis. Is s/p (Right carotid endarterectomy with bovine patch angioplasty and intraoperative EEG monitoring) that was performed by Dr. Amos earlier today. Patient was admitted to hospital for overnight observation.  Hospital Medicine consulted for medical management and pain control during stay.  Patient tolerated procedure well without complications.  Patient's only complaint is mild soreness to surgical site.  Patient currently sitting upright eating crackers and drinking juice without any difficulty.  Patient reports she has no issues maintaining airway and currently rates her pain 4/10. Patient reports she did take all her home medications earlier today and states he only nighttime medication she takes is Celexa. Denies any other complaints at this time.        PCP: Alonso Cortez      Past Medical History:   Diagnosis Date    Arthritis     Hyperlipidemia     Hypertension     Osteoporosis        Past Surgical History:   Procedure Laterality Date    BILATERAL TUBAL LIGATION Bilateral 1994    ORIF FINGER FRACTURE Left 1987       Review of patient's allergies indicates:   Allergen Reactions    Statins-hmg-coa reductase inhibitors      Leg cramps and pain        No current facility-administered medications on file prior to encounter.     Current Outpatient Medications on File Prior to Encounter   Medication Sig     alendronate (FOSAMAX) 70 MG tablet TAKE ONE TABLET BY MOUTH EVERY 7 DAYS (Patient taking differently: Take 70 mg by mouth every 7 days. monday)    aspirin (ECOTRIN) 81 MG EC tablet Take 81 mg by mouth once daily.    citalopram (CELEXA) 20 MG tablet TAKE ONE TABLET BY MOUTH EVERY DAY (Patient taking differently: Take 20 mg by mouth every evening.)    losartan (COZAAR) 100 MG tablet TAKE ONE TABLET BY MOUTH DAILY    metoprolol succinate (TOPROL-XL) 25 MG 24 hr tablet TAKE 1/2 TABLET BY MOUTH EVERY DAY    potassium chloride (MICRO-K) 10 MEQ CpSR TAKE TWO CAPSULES BY MOUTH EVERY DAY    triamterene-hydrochlorothiazide 37.5-25 mg (MAXZIDE-25) 37.5-25 mg per tablet TAKE 1/2 TABLET BY MOUTH EVERY DAY (Patient taking differently: Take 1 tablet by mouth once daily.)    ALA-HIST IR 2 mg Tab Take 1 tablet by mouth every morning.    azelastine (ASTELIN) 137 mcg (0.1 %) nasal spray 1 spray 2 (two) times daily. (Patient not taking: Reported on 5/8/2024)    cholecalciferol, vitamin D3, (VITAMIN D3 ORAL) Take by mouth. (Patient not taking: Reported on 5/23/2024)    meloxicam (MOBIC) 7.5 MG tablet Take 1 tablet (7.5 mg total) by mouth daily as needed for Pain. (Patient not taking: Reported on 5/8/2024)     Family History       Problem Relation (Age of Onset)    Breast cancer Paternal Aunt          Tobacco Use    Smoking status: Former     Current packs/day: 0.50     Average packs/day: 0.5 packs/day for 45.0 years (22.5 ttl pk-yrs)     Types: Cigarettes    Smokeless tobacco: Never    Tobacco comments:     Hold midnight   Substance and Sexual Activity    Alcohol use: Never    Drug use: No    Sexual activity: Never     Partners: Male     Birth control/protection: None     Review of Systems   Constitutional:  Negative for chills, diaphoresis, fatigue and fever.   Respiratory:  Negative for cough and shortness of breath.    Cardiovascular:  Negative for chest pain and palpitations.   Gastrointestinal:  Negative for diarrhea, nausea and  vomiting.   Musculoskeletal:  Positive for neck pain. Negative for neck stiffness.   All other systems reviewed and are negative.    Objective:     Vital Signs (Most Recent):  Temp: 97.6 °F (36.4 °C) (05/30/24 1951)  Pulse: 72 (05/30/24 1951)  Resp: 18 (05/30/24 1951)  BP: (!) 144/63 (05/30/24 1951)  SpO2: 97 % (05/30/24 1951) Vital Signs (24h Range):  Temp:  [97.4 °F (36.3 °C)-97.7 °F (36.5 °C)] 97.6 °F (36.4 °C)  Pulse:  [65-89] 72  Resp:  [12-24] 18  SpO2:  [92 %-100 %] 97 %  BP: (102-155)/(49-92) 144/63  Arterial Line BP: (102-157)/(37-87) 157/58     Weight: 58 kg (127 lb 12.1 oz)  Body mass index is 21.93 kg/m².     Physical Exam  Vitals and nursing note reviewed.   Constitutional:       General: She is awake. She is not in acute distress.     Appearance: Normal appearance. She is well-developed and well-groomed. She is not ill-appearing, toxic-appearing or diaphoretic.   HENT:      Head: Normocephalic and atraumatic.   Eyes:      Extraocular Movements: Extraocular movements intact.      Conjunctiva/sclera: Conjunctivae normal.   Neck:        Comments: Surgical dressing clean, dry and intact.   Cardiovascular:      Rate and Rhythm: Normal rate and regular rhythm.      Heart sounds: Normal heart sounds. No murmur heard.  Pulmonary:      Effort: Pulmonary effort is normal.      Breath sounds: Normal breath sounds. No decreased breath sounds, wheezing, rhonchi or rales.   Abdominal:      General: Bowel sounds are normal.      Palpations: Abdomen is soft.      Tenderness: There is no abdominal tenderness.   Musculoskeletal:      Cervical back: Normal range of motion and neck supple.      Right lower leg: No edema.      Left lower leg: No edema.      Comments: 5/5 strength throughout   Skin:     General: Skin is warm and dry.      Capillary Refill: Capillary refill takes less than 2 seconds.   Neurological:      General: No focal deficit present.      Mental Status: She is alert and oriented to person, place, and  time. Mental status is at baseline.      GCS: GCS eye subscore is 4. GCS verbal subscore is 5. GCS motor subscore is 6.      Cranial Nerves: Cranial nerves 2-12 are intact.      Sensory: Sensation is intact.      Motor: Motor function is intact.   Psychiatric:         Mood and Affect: Mood normal.         Speech: Speech normal.         Behavior: Behavior normal. Behavior is cooperative.        LABS:  No results found for this or any previous visit (from the past 24 hour(s)).    RADIOLOGY  No results found.    EKG    MICROBIOLOGY    MDM     Amount and/or Complexity of Data Reviewed  Clinical lab tests: reviewed  Tests in the radiology section of CPT®: reviewed  Tests in the medicine section of CPT®: reviewed  Discussion of test results with the performing providers: yes  Decide to obtain previous medical records or to obtain history from someone other than the patient: yes  Obtain history from someone other than the patient: yes  Review and summarize past medical records: yes  Discuss the patient with other providers: yes  Independent visualization of images, tracings, or specimens: yes        Assessment/Plan:     * S/P carotid endarterectomy  Plan:  -analgesics prn  -monitor for bleeding at surgical site  -CVT primary team, will f/u in am      HTN (hypertension)  Chronic, controlled. Latest blood pressure and vitals reviewed-     Temp:  [97.4 °F (36.3 °C)-97.7 °F (36.5 °C)]   Pulse:  [65-89]   Resp:  [12-24]   BP: (102-155)/(49-92)   SpO2:  [92 %-100 %]   Arterial Line BP: (102-157)/(37-87) .   Home meds for hypertension were reviewed and noted below.   Hypertension Medications               losartan (COZAAR) 100 MG tablet TAKE ONE TABLET BY MOUTH DAILY    metoprolol succinate (TOPROL-XL) 25 MG 24 hr tablet TAKE 1/2 TABLET BY MOUTH EVERY DAY    triamterene-hydrochlorothiazide 37.5-25 mg (MAXZIDE-25) 37.5-25 mg per tablet TAKE 1/2 TABLET BY MOUTH EVERY DAY            While in the hospital, will manage blood pressure  as follows; Continue home antihypertensive regimen    Will utilize p.r.n. blood pressure medication only if patient's blood pressure greater than 160/100 and she develops symptoms such as worsening chest pain or shortness of breath.    Hyperlipidemia  Patient is not chronically on statin.will not continue for now. Last Lipid Panel:   Lab Results   Component Value Date    CHOL 164 04/08/2024    HDL 59 04/08/2024    LDLCALC 62.6 (L) 04/08/2024    TRIG 212 (H) 04/08/2024    CHOLHDL 36.0 04/08/2024     Plan:  -low fat/low calorie diet        Major depression in full remission  Chronic. Stable. Not in acute exacerbation and currently denies endorsing any suicidal/homicidal ideations.   Plan:  -Continue home medications (celexa)        VTE Risk Mitigation (From admission, onward)           Ordered     IP VTE HIGH RISK PATIENT  Once         05/30/24 1830     Place sequential compression device  Until discontinued         05/30/24 1830                    Thank you for your consult.Hospital Medicine will follow-up with patient. Please contact us if you have any additional questions.    Joao Serrano NP  Department of Hospital Medicine   [facility]

## 2024-05-31 NOTE — ASSESSMENT & PLAN NOTE
Patient is not chronically on statin.will not continue for now. Last Lipid Panel:   Lab Results   Component Value Date    CHOL 164 04/08/2024    HDL 59 04/08/2024    LDLCALC 62.6 (L) 04/08/2024    TRIG 212 (H) 04/08/2024    CHOLHDL 36.0 04/08/2024     Plan:  -low fat/low calorie diet

## 2024-05-31 NOTE — HOSPITAL COURSE
Emily Ordaz is a 68 y.o. female with a PMH  has a past medical history of Arthritis, Hyperlipidemia, Hypertension, and Osteoporosis. Is s/p (Right carotid endarterectomy with bovine patch angioplasty and intraoperative EEG monitoring) that was performed by Dr. Amos earlier today. Patient was admitted to hospital for overnight observation.  Hospital Medicine consulted for medical management and pain control during stay.  Patient tolerated procedure well without complications.  Patient's only complaint is mild soreness to surgical site.  Patient currently sitting upright eating crackers and drinking juice without any difficulty.  Patient reports she has no issues maintaining airway and currently rates her pain 4/10. Patient reports she did take all her home medications earlier today and states he only nighttime medication she takes is Celexa. Denies any other complaints at this time.

## 2024-05-31 NOTE — HPI
Emily Ordaz is a 68 y.o. female with a PMH  has a past medical history of Arthritis, Hyperlipidemia, Hypertension, and Osteoporosis. Is s/p (Right carotid endarterectomy with bovine patch angioplasty and intraoperative EEG monitoring) that was performed by Dr. Amos earlier today. Patient was admitted to hospital for overnight observation.  Hospital Medicine consulted for medical management and pain control during stay.  Patient tolerated procedure well without complications.  Patient's only complaint is mild soreness to surgical site.  Patient currently sitting upright eating crackers and drinking juice without any difficulty.  Patient reports she has no issues maintaining airway and currently rates her pain 4/10. Patient reports she did take all her home medications earlier today and states he only nighttime medication she takes is Celexa. Denies any other complaints at this time.        PCP: Alonso Cortez

## 2024-05-31 NOTE — SUBJECTIVE & OBJECTIVE
Past Medical History:   Diagnosis Date    Arthritis     Hyperlipidemia     Hypertension     Osteoporosis        Past Surgical History:   Procedure Laterality Date    BILATERAL TUBAL LIGATION Bilateral 1994    ORIF FINGER FRACTURE Left 1987       Review of patient's allergies indicates:   Allergen Reactions    Statins-hmg-coa reductase inhibitors      Leg cramps and pain        No current facility-administered medications on file prior to encounter.     Current Outpatient Medications on File Prior to Encounter   Medication Sig    alendronate (FOSAMAX) 70 MG tablet TAKE ONE TABLET BY MOUTH EVERY 7 DAYS (Patient taking differently: Take 70 mg by mouth every 7 days. monday)    aspirin (ECOTRIN) 81 MG EC tablet Take 81 mg by mouth once daily.    citalopram (CELEXA) 20 MG tablet TAKE ONE TABLET BY MOUTH EVERY DAY (Patient taking differently: Take 20 mg by mouth every evening.)    losartan (COZAAR) 100 MG tablet TAKE ONE TABLET BY MOUTH DAILY    metoprolol succinate (TOPROL-XL) 25 MG 24 hr tablet TAKE 1/2 TABLET BY MOUTH EVERY DAY    potassium chloride (MICRO-K) 10 MEQ CpSR TAKE TWO CAPSULES BY MOUTH EVERY DAY    triamterene-hydrochlorothiazide 37.5-25 mg (MAXZIDE-25) 37.5-25 mg per tablet TAKE 1/2 TABLET BY MOUTH EVERY DAY (Patient taking differently: Take 1 tablet by mouth once daily.)    ALA-HIST IR 2 mg Tab Take 1 tablet by mouth every morning.    azelastine (ASTELIN) 137 mcg (0.1 %) nasal spray 1 spray 2 (two) times daily. (Patient not taking: Reported on 5/8/2024)    cholecalciferol, vitamin D3, (VITAMIN D3 ORAL) Take by mouth. (Patient not taking: Reported on 5/23/2024)    meloxicam (MOBIC) 7.5 MG tablet Take 1 tablet (7.5 mg total) by mouth daily as needed for Pain. (Patient not taking: Reported on 5/8/2024)     Family History       Problem Relation (Age of Onset)    Breast cancer Paternal Aunt          Tobacco Use    Smoking status: Former     Current packs/day: 0.50     Average packs/day: 0.5 packs/day for 45.0  years (22.5 ttl pk-yrs)     Types: Cigarettes    Smokeless tobacco: Never    Tobacco comments:     Hold midnight   Substance and Sexual Activity    Alcohol use: Never    Drug use: No    Sexual activity: Never     Partners: Male     Birth control/protection: None     Review of Systems   Constitutional:  Negative for chills, diaphoresis, fatigue and fever.   Respiratory:  Negative for cough and shortness of breath.    Cardiovascular:  Negative for chest pain and palpitations.   Gastrointestinal:  Negative for diarrhea, nausea and vomiting.   Musculoskeletal:  Positive for neck pain. Negative for neck stiffness.   All other systems reviewed and are negative.    Objective:     Vital Signs (Most Recent):  Temp: 97.6 °F (36.4 °C) (05/30/24 1951)  Pulse: 72 (05/30/24 1951)  Resp: 18 (05/30/24 1951)  BP: (!) 144/63 (05/30/24 1951)  SpO2: 97 % (05/30/24 1951) Vital Signs (24h Range):  Temp:  [97.4 °F (36.3 °C)-97.7 °F (36.5 °C)] 97.6 °F (36.4 °C)  Pulse:  [65-89] 72  Resp:  [12-24] 18  SpO2:  [92 %-100 %] 97 %  BP: (102-155)/(49-92) 144/63  Arterial Line BP: (102-157)/(37-87) 157/58     Weight: 58 kg (127 lb 12.1 oz)  Body mass index is 21.93 kg/m².     Physical Exam  Vitals and nursing note reviewed.   Constitutional:       General: She is awake. She is not in acute distress.     Appearance: Normal appearance. She is well-developed and well-groomed. She is not ill-appearing, toxic-appearing or diaphoretic.   HENT:      Head: Normocephalic and atraumatic.   Eyes:      Extraocular Movements: Extraocular movements intact.      Conjunctiva/sclera: Conjunctivae normal.   Neck:        Comments: Surgical dressing clean, dry and intact.   Cardiovascular:      Rate and Rhythm: Normal rate and regular rhythm.      Heart sounds: Normal heart sounds. No murmur heard.  Pulmonary:      Effort: Pulmonary effort is normal.      Breath sounds: Normal breath sounds. No decreased breath sounds, wheezing, rhonchi or rales.   Abdominal:       General: Bowel sounds are normal.      Palpations: Abdomen is soft.      Tenderness: There is no abdominal tenderness.   Musculoskeletal:      Cervical back: Normal range of motion and neck supple.      Right lower leg: No edema.      Left lower leg: No edema.      Comments: 5/5 strength throughout   Skin:     General: Skin is warm and dry.      Capillary Refill: Capillary refill takes less than 2 seconds.   Neurological:      General: No focal deficit present.      Mental Status: She is alert and oriented to person, place, and time. Mental status is at baseline.      GCS: GCS eye subscore is 4. GCS verbal subscore is 5. GCS motor subscore is 6.      Cranial Nerves: Cranial nerves 2-12 are intact.      Sensory: Sensation is intact.      Motor: Motor function is intact.   Psychiatric:         Mood and Affect: Mood normal.         Speech: Speech normal.         Behavior: Behavior normal. Behavior is cooperative.        LABS:  No results found for this or any previous visit (from the past 24 hour(s)).    RADIOLOGY  No results found.    EKG    MICROBIOLOGY    MDM     Amount and/or Complexity of Data Reviewed  Clinical lab tests: reviewed  Tests in the radiology section of CPT®: reviewed  Tests in the medicine section of CPT®: reviewed  Discussion of test results with the performing providers: yes  Decide to obtain previous medical records or to obtain history from someone other than the patient: yes  Obtain history from someone other than the patient: yes  Review and summarize past medical records: yes  Discuss the patient with other providers: yes  Independent visualization of images, tracings, or specimens: yes

## 2024-05-31 NOTE — ASSESSMENT & PLAN NOTE
Plan:  -analgesics prn  -monitor for bleeding at surgical site  -CVT primary team, will f/u in am

## 2024-05-31 NOTE — PROGRESS NOTES
"      PLAN:  Reg diet  PT/OT to mobilize  Pain control  D/c later today  Continue ASA and statin therapy on d/c    Tyler Calvo    POD 1: right CEA    Interval History:  No complaint of headache or nausea/vomiting. Blood pressure controlled overnight. States eating and drinking well without issues. Pain controlled at present. Has gotten up and gone to the restroom this morning.   States ready to go home today    Neuro: Intact without deficits suggestive of TIA/CVA, no tongue deviation   Drains: None  Wounds: Clean, dry and intact w/ dressing in place    Labs:   I have reviewed all pertinent lab results within the past 24 hours.  CBC: No results for input(s): "WBC", "RBC", "HGB", "HCT", "PLT", "MCV", "MCH", "MCHC" in the last 168 hours.  BMP: No results for input(s): "GLU", "NA", "K", "CL", "CO2", "BUN", "CREATININE", "CALCIUM", "MG" in the last 168 hours.    VITAL SIGNS: 24 HR MIN & MAX LAST    Temp  Min: 97.4 °F (36.3 °C)  Max: 97.7 °F (36.5 °C)  97.7 °F (36.5 °C)    BP  Min: 102/49  Max: 168/70  (!) 149/70     Pulse  Min: 65  Max: 95  95     Resp  Min: 12  Max: 24  16    SpO2  Min: 92 %  Max: 100 %  98 %      Intake/Output:  No intake/output data recorded.  No intake/output data recorded.    Patient was seen today with Dr. Marcos, who personally examined the patient and formulated the plan of care.    "

## 2024-05-31 NOTE — DISCHARGE SUMMARY
[facility]  Discharge Note  Short Stay    Procedure(s) (LRB):  ENDARTERECTOMY, CAROTID (Right)    OUTCOME: Condition has improved and patient is now ready for discharge.    DISPOSITION: Home or Self Care    FINAL DIAGNOSIS:  S/P carotid endarterectomy    FOLLOWUP: In clinic    DISCHARGE INSTRUCTIONS:    Discharge Procedure Orders   Basic Metabolic Panel   Standing Status: Future Number of Occurrences: 1 Standing Exp. Date: 07/22/25     CBC Auto Differential   Standing Status: Future Number of Occurrences: 1 Standing Exp. Date: 07/22/25        TIME SPENT ON DISCHARGE: >30 minutes

## 2024-05-31 NOTE — PLAN OF CARE
Patient ready for discharge. Discharge instructions provided. Telemetry discontinued per order. IV's removed per order.

## 2024-05-31 NOTE — PROGRESS NOTES
[facility]  Progress Note    Patient Name: Emily Ordaz  MRN: [id number]  Patient Class: IP- Inpatient   Admission Date: 5/30/2024  Length of Stay: 1 days  Attending Physician: Fanta Amos MD  Primary Care Provider: Alonso Cortez MD        Subjective:     Principal Problem:S/P carotid endarterectomy        HPI:  Emily Ordaz is a 68 y.o. female with a PMH  has a past medical history of Arthritis, Hyperlipidemia, Hypertension, and Osteoporosis. Is s/p (Right carotid endarterectomy with bovine patch angioplasty and intraoperative EEG monitoring) that was performed by Dr. Amos earlier today. Patient was admitted to hospital for overnight observation.  Hospital Medicine consulted for medical management and pain control during stay.  Patient tolerated procedure well without complications.  Patient's only complaint is mild soreness to surgical site.  Patient currently sitting upright eating crackers and drinking juice without any difficulty.  Patient reports she has no issues maintaining airway and currently rates her pain 4/10. Patient reports she did take all her home medications earlier today and states he only nighttime medication she takes is Celexa. Denies any other complaints at this time.        PCP: Alonso Cortez      Overview/Hospital Course:  Emily Ordaz is a 68 y.o. female with a PMH  has a past medical history of Arthritis, Hyperlipidemia, Hypertension, and Osteoporosis. Is s/p (Right carotid endarterectomy with bovine patch angioplasty and intraoperative EEG monitoring) that was performed by Dr. Amos earlier today. Patient was admitted to hospital for overnight observation.  Hospital Medicine consulted for medical management and pain control during stay.  Patient tolerated procedure well without complications.  Patient's only complaint is mild soreness to surgical site.  Patient currently sitting upright eating crackers and drinking juice without any  difficulty.  Patient reports she has no issues maintaining airway and currently rates her pain 4/10. Patient reports she did take all her home medications earlier today and states he only nighttime medication she takes is Celexa. Denies any other complaints at this time.     Interval History:     No acute events overnight   Resting comfortably   Hemodynamically stable   Labs reviewed   Pain controlled on current regimen  Surgery site looks intact, no redness  Discharge today per primary/vascular rehab    Review of Systems      Constitutional:  Negative for chills, diaphoresis, fatigue and fever.   Respiratory:  Negative for cough and shortness of breath.    Cardiovascular:  Negative for chest pain and palpitations.   Gastrointestinal:  Negative for diarrhea, nausea and vomiting.   Musculoskeletal:  Positive for neck pain. Negative for neck stiffness.       Objective:     Vital Signs (Most Recent):  Temp: 97.7 °F (36.5 °C) (05/31/24 0737)  Pulse: 95 (05/31/24 0810)  Resp: 16 (05/31/24 0737)  BP: (!) 149/70 (05/31/24 0737)  SpO2: 98 % (05/31/24 0737) Vital Signs (24h Range):  Temp:  [97.4 °F (36.3 °C)-97.7 °F (36.5 °C)] 97.7 °F (36.5 °C)  Pulse:  [65-95] 95  Resp:  [12-24] 16  SpO2:  [92 %-100 %] 98 %  BP: (102-168)/(49-92) 149/70  Arterial Line BP: (102-157)/(37-87) 157/58     Weight: 58 kg (127 lb 12.1 oz)  Body mass index is 21.93 kg/m².  No intake or output data in the 24 hours ending 05/31/24 0939      Physical Exam      Constitutional:       General: She is awake. She is not in acute distress.     Appearance: Normal appearance. She is well-developed and well-groomed. She is not ill-appearing, toxic-appearing or diaphoretic.   HENT:      Head: Normocephalic and atraumatic.   Eyes:      Extraocular Movements: Extraocular movements intact.      Conjunctiva/sclera: Conjunctivae normal.   Neck:        Comments: Surgical dressing clean, dry and intact.   Cardiovascular:      Rate and Rhythm: Normal rate and regular  "rhythm.      Heart sounds: Normal heart sounds. No murmur heard.  Pulmonary:      Effort: Pulmonary effort is normal.      Breath sounds: Normal breath sounds. No decreased breath sounds, wheezing, rhonchi or rales.   Abdominal:      General: Bowel sounds are normal.      Palpations: Abdomen is soft.      Tenderness: There is no abdominal tenderness.   Musculoskeletal:      Cervical back: Normal range of motion and neck supple.      Right lower leg: No edema.      Left lower leg: No edema.      Comments: 5/5 strength throughout   Skin:     General: Skin is warm and dry.      Capillary Refill: Capillary refill takes less than 2 seconds.   Neurological:      General: No focal deficit present.      Mental Status: She is alert and oriented to person, place, and time. Mental status is at baseline.      GCS: GCS eye subscore is 4. GCS verbal subscore is 5. GCS motor subscore is 6.      Cranial Nerves: Cranial nerves 2-12 are intact.      Sensory: Sensation is intact.      Motor: Motor function is intact.   Psychiatric:         Mood and Affect: Mood normal.         Speech: Speech normal.         Behavior: Behavior normal. Behavior is cooperative.   Significant Labs: All pertinent labs within the past 24 hours have been reviewed.  CBC: No results for input(s): "WBC", "HGB", "HCT", "PLT" in the last 48 hours.  CMP: No results for input(s): "NA", "K", "CL", "CO2", "GLU", "BUN", "CREATININE", "CALCIUM", "PROT", "ALBUMIN", "BILITOT", "ALKPHOS", "AST", "ALT", "ANIONGAP", "EGFRNONAA" in the last 48 hours.    Invalid input(s): "ESTGFAFRICA"    Significant Imaging:          Assessment/Plan:      * S/P carotid endarterectomy  Plan:  -analgesics prn  -monitor for bleeding at surgical site  -CVT primary team, will f/u in am      Major depression in full remission  Chronic. Stable. Not in acute exacerbation and currently denies endorsing any suicidal/homicidal ideations.   Plan:  -Continue home medications " (celexa)      Hyperlipidemia  Patient is not chronically on statin.will not continue for now. Last Lipid Panel:   Lab Results   Component Value Date    CHOL 164 04/08/2024    HDL 59 04/08/2024    LDLCALC 62.6 (L) 04/08/2024    TRIG 212 (H) 04/08/2024    CHOLHDL 36.0 04/08/2024     Plan:  -low fat/low calorie diet        HTN (hypertension)  Chronic, controlled. Latest blood pressure and vitals reviewed-     Temp:  [97.4 °F (36.3 °C)-97.7 °F (36.5 °C)]   Pulse:  [65-89]   Resp:  [12-24]   BP: (102-155)/(49-92)   SpO2:  [92 %-100 %]   Arterial Line BP: (102-157)/(37-87) .   Home meds for hypertension were reviewed and noted below.   Hypertension Medications               losartan (COZAAR) 100 MG tablet TAKE ONE TABLET BY MOUTH DAILY    metoprolol succinate (TOPROL-XL) 25 MG 24 hr tablet TAKE 1/2 TABLET BY MOUTH EVERY DAY    triamterene-hydrochlorothiazide 37.5-25 mg (MAXZIDE-25) 37.5-25 mg per tablet TAKE 1/2 TABLET BY MOUTH EVERY DAY            While in the hospital, will manage blood pressure as follows; Continue home antihypertensive regimen    Will utilize p.r.n. blood pressure medication only if patient's blood pressure greater than 160/100 and she develops symptoms such as worsening chest pain or shortness of breath.      VTE Risk Mitigation (From admission, onward)           Ordered     IP VTE HIGH RISK PATIENT  Once         05/30/24 1830     Place sequential compression device  Until discontinued         05/30/24 1830                    Discharge Planning   GORDO: 5/31/2024     Code Status: Not on file   Is the patient medically ready for discharge?:     Reason for patient still in hospital (select all that apply): discharge today per vascular  Discharge Plan A: Home with family   Discharge Delays: None known at this time              Dileep Rodriguez MD  Department of Hospital Medicine   [facility]

## 2024-05-31 NOTE — PLAN OF CARE
[facility]  Initial Discharge Assessment       Primary Care Provider: Alonso Cortez MD    Admission Diagnosis: Occlusion of right carotid artery [I65.21]  Preop testing [Z01.818]    Admission Date: 5/30/2024  Expected Discharge Date: 5/31/2024    Transition of Care Barriers: None    Payor: HU[employer] MANAGED MEDICARE / Plan: HU[employer] MEDICARE HMO / Product Type: Capitation /     Extended Emergency Contact Information  Primary Emergency Contact: Robles Silva  Address: [address] [place] [address] [facility]  Home Phone: [phone]  Mobile Phone: [phone]  Relation: Significant other    Discharge Plan A: Home with family  Discharge Plan B: Home Health      [facility] - [place], [place] - [address] [place] [facility] [address]  Phone: [phone] Fax: [phone]      Initial Assessment (most recent)       Adult Discharge Assessment - 05/31/24 0926          Discharge Assessment    Assessment Type Discharge Planning Assessment     Confirmed/corrected address, phone number and insurance Yes     Source of Information patient     People in Home significant other;grandchild(delores)     Do you expect to return to your current living situation? Yes     Do you have help at home or someone to help you manage your care at home? Yes     Who are your caregiver(s) and their phone number(s)? significant other     Prior to hospitilization cognitive status: Alert/Oriented     Current cognitive status: Alert/Oriented     Walking or Climbing Stairs Difficulty no     Dressing/Bathing Difficulty no     Equipment Currently Used at Home none     Readmission within 30 days? No     Patient currently being followed by outpatient case management? No     Do you currently have service(s) that help you manage your care at home? No     Do you take prescription medications? Yes     Do you have prescription coverage? Yes     Do you have any problems affording any of your  prescribed medications? No     Is the patient taking medications as prescribed? yes     How do you get to doctors appointments? car, drives self     Are you on dialysis? No     Discharge Plan A Home with family     Discharge Plan B Home Health     DME Needed Upon Discharge  none     Discharge Plan discussed with: Patient     Transition of Care Barriers None

## 2024-06-01 ENCOUNTER — NURSE TRIAGE (OUTPATIENT)
Dept: ADMINISTRATIVE | Facility: CLINIC | Age: 69
End: 2024-06-01
Payer: MEDICARE

## 2024-06-01 NOTE — TELEPHONE ENCOUNTER
Pt escalated to triage queue for Patient had procedure this past week and has questions and concerns in re guards  taking a  shower and leaving and or taking bandages off.  Attempts x 2 made to contact pt without success; LVM x 2 on unidentified VM; # verified x 2.  No contact call.    Reason for Disposition   Message left on unidentified voice mail.  Phone number verified.    Additional Information   Negative: Caller has already spoken with the PCP and has no further questions.   Negative: Caller has already spoken with another triager and has no further questions.   Negative: Caller has already spoken with another triager or PCP AND has further questions AND triager able to answer questions.   Negative: Busy signal.  First attempt to contact caller.  Follow-up call scheduled within 15 minutes.   Negative: No answer.  First attempt to contact caller.  Follow-up call scheduled within 15 minutes.   Negative: Message left on identified voice mail    Protocols used: No Contact or Duplicate Contact Call-A-

## 2024-06-04 ENCOUNTER — PATIENT OUTREACH (OUTPATIENT)
Dept: ADMINISTRATIVE | Facility: CLINIC | Age: 69
End: 2024-06-04
Payer: MEDICARE

## 2024-06-04 ENCOUNTER — PATIENT MESSAGE (OUTPATIENT)
Dept: ADMINISTRATIVE | Facility: CLINIC | Age: 69
End: 2024-06-04
Payer: MEDICARE

## 2024-06-04 NOTE — PROGRESS NOTES
C3 nurse attempted to contact Emily Ordaz for a TCC post hospital discharge follow up call. No answer. Left voicemail with callback information. The patient does not have a scheduled HOSFU appointment. Message sent to PCP staff for assistance with scheduling visit with patient.      
C3 nurse spoke with Emily Ordaz for a TCC post hospital discharge follow up call. The patient has a scheduled HOSPFU Dr Cortez on 6/13/24 @@ 340pm         
Normal

## 2024-06-13 ENCOUNTER — OFFICE VISIT (OUTPATIENT)
Dept: FAMILY MEDICINE | Facility: CLINIC | Age: 69
End: 2024-06-13
Payer: MEDICARE

## 2024-06-13 ENCOUNTER — TELEPHONE (OUTPATIENT)
Dept: FAMILY MEDICINE | Facility: CLINIC | Age: 69
End: 2024-06-13
Payer: MEDICARE

## 2024-06-13 VITALS
HEART RATE: 85 BPM | BODY MASS INDEX: 22.33 KG/M2 | SYSTOLIC BLOOD PRESSURE: 130 MMHG | WEIGHT: 130.06 LBS | OXYGEN SATURATION: 97 % | DIASTOLIC BLOOD PRESSURE: 70 MMHG

## 2024-06-13 DIAGNOSIS — Z09 HOSPITAL DISCHARGE FOLLOW-UP: ICD-10-CM

## 2024-06-13 DIAGNOSIS — Z98.890 S/P CAROTID ENDARTERECTOMY: Primary | ICD-10-CM

## 2024-06-13 DIAGNOSIS — Z00.00 WELL ADULT EXAM: ICD-10-CM

## 2024-06-13 DIAGNOSIS — F17.200 SMOKER: ICD-10-CM

## 2024-06-13 DIAGNOSIS — I10 PRIMARY HYPERTENSION: Primary | ICD-10-CM

## 2024-06-13 DIAGNOSIS — R79.9 ABNORMAL FINDING OF BLOOD CHEMISTRY, UNSPECIFIED: ICD-10-CM

## 2024-06-13 DIAGNOSIS — E78.2 MIXED HYPERLIPIDEMIA: ICD-10-CM

## 2024-06-13 PROCEDURE — 3044F HG A1C LEVEL LT 7.0%: CPT | Mod: HCNC,CPTII,S$GLB, | Performed by: FAMILY MEDICINE

## 2024-06-13 PROCEDURE — 3075F SYST BP GE 130 - 139MM HG: CPT | Mod: HCNC,CPTII,S$GLB, | Performed by: FAMILY MEDICINE

## 2024-06-13 PROCEDURE — 1159F MED LIST DOCD IN RCRD: CPT | Mod: HCNC,CPTII,S$GLB, | Performed by: FAMILY MEDICINE

## 2024-06-13 PROCEDURE — 99999 PR PBB SHADOW E&M-EST. PATIENT-LVL III: CPT | Mod: PBBFAC,HCNC,, | Performed by: FAMILY MEDICINE

## 2024-06-13 PROCEDURE — 4010F ACE/ARB THERAPY RXD/TAKEN: CPT | Mod: HCNC,CPTII,S$GLB, | Performed by: FAMILY MEDICINE

## 2024-06-13 PROCEDURE — 99495 TRANSJ CARE MGMT MOD F2F 14D: CPT | Mod: HCNC,S$GLB,, | Performed by: FAMILY MEDICINE

## 2024-06-13 PROCEDURE — 3078F DIAST BP <80 MM HG: CPT | Mod: HCNC,CPTII,S$GLB, | Performed by: FAMILY MEDICINE

## 2024-06-13 PROCEDURE — 1111F DSCHRG MED/CURRENT MED MERGE: CPT | Mod: HCNC,CPTII,S$GLB, | Performed by: FAMILY MEDICINE

## 2024-06-13 NOTE — PROGRESS NOTES
Transitional Care Note  Subjective:       Patient ID: Emily Ordaz is a 68 y.o. female.  Chief Complaint: No chief complaint on file.    Family and/or Caretaker present at visit?  Yes.  Diagnostic tests reviewed/disposition: No diagnosic tests pending after this hospitalization.  Disease/illness education:   Home health/community services discussion/referrals: Patient does not have home health established from hospital visit.  They do not need home health.  If needed, we will set up home health for the patient.   Establishment or re-establishment of referral orders for community resources: No other necessary community resources.   Discussion with other health care providers: No discussion with other health care providers necessary.   <!--RTF_S-->  History of Present Illness  Patient presents today for follow-up after recent carotid artery surgery. She underwent carotid artery surgery on May 30th, approximately 2 weeks ago, and was hospitalized for one night. The surgeon informed her  there were no complications. She has not needed any pain medication since the surgery but experiences some numbness on the side of the neck where the surgery was performed. Prior to the surgery, she had difficulty with peripheral vision and trouble getting words out, which has improved since the procedure. She reports only smoking 3 cigarettes in the 2 weeks since surgery. She recognizes the importance of smoking cessation in preventing further vascular disease. She denies any trouble swallowing, talking, fever, cough, or other concerning symptoms since the surgery. She expresses relief at being able to feel her own pulse again. She is currently taking cholesterol medication. She prefers to avoid pain medications and was advised that acetaminophen, ibuprofen, and naproxen are acceptable options to take with food as needed. She recently filled a 90-day supply of medications and will call for refills as needed prior to next  appointment. Her last cholesterol was 160 with good LDL. Her last blood sugar was also good. She denies any other subjective complaints related to labs.    <!--RTF_E-->        Review of Systems   Constitutional:  Negative for activity change, chills, fatigue, fever and unexpected weight change.   HENT:  Negative for congestion, ear discharge, ear pain, hearing loss, postnasal drip and rhinorrhea.    Eyes:  Negative for pain and visual disturbance.   Respiratory:  Negative for cough, chest tightness and shortness of breath.    Cardiovascular:  Negative for chest pain and palpitations.   Gastrointestinal:  Negative for abdominal pain, diarrhea and vomiting.   Endocrine: Negative for heat intolerance.   Genitourinary:  Negative for dysuria, flank pain, frequency and hematuria.   Musculoskeletal:  Negative for back pain, gait problem and neck pain.   Skin:  Negative for color change and rash.   Neurological:  Negative for dizziness, tremors, seizures, numbness and headaches.   Psychiatric/Behavioral:  Negative for agitation, hallucinations, self-injury, sleep disturbance and suicidal ideas. The patient is not nervous/anxious.        Objective:      Physical Exam  Constitutional:       Appearance: She is well-developed.   Eyes:      Conjunctiva/sclera: Conjunctivae normal.      Pupils: Pupils are equal, round, and reactive to light.   Cardiovascular:      Rate and Rhythm: Normal rate and regular rhythm.      Heart sounds: Normal heart sounds. No murmur heard.  Pulmonary:      Effort: Pulmonary effort is normal. No respiratory distress.      Breath sounds: Normal breath sounds. No wheezing or rales.   Chest:      Chest wall: No tenderness.   Abdominal:      General: There is no distension.      Palpations: Abdomen is soft. There is no mass.      Tenderness: There is no abdominal tenderness. There is no guarding.   Musculoskeletal:         General: No tenderness.      Cervical back: Normal range of motion and neck supple.    Lymphadenopathy:      Cervical: No cervical adenopathy.   Skin:     General: Skin is warm and dry.   Neurological:      Mental Status: She is alert and oriented to person, place, and time.      Deep Tendon Reflexes: Reflexes are normal and symmetric.   Psychiatric:         Behavior: Behavior normal.         Thought Content: Thought content normal.         Judgment: Judgment normal.         Assessment:       1. S/P carotid endarterectomy    2. Hospital discharge follow-up    3. Smoker        Plan:         Assessment & Plan    CAROTID ARTERY SURGERY RECOVERY:  1. Patient is doing well after recent carotid artery surgery by Dr. Amos on 5/30, with no pain, swallowing issues, or speech problems.  2. Some expected numbness and occasional stinging pain at incision site, which may persist long-term due to superficial nerve damage.  3. Discussed potential complications of carotid artery surgery, including permanent speech and swallowing deficits.  4. Follow up with surgeon Dr. Amos on 6/24 as scheduled for repeat ultrasound and post-op check.  SMOKING AND CARDIOVASCULAR HEALTH:  1. Smoking identified as the primary cause of carotid artery blockage.  2. Educated on smoking being the number one cause of plaque formation in arteries.  PAIN MANAGEMENT:  1. Okay to take Tylenol, Advil or Aleve as needed for pain, but take with food to avoid stomach upset.  2. Avoid Mobic (meloxicam) due to history of stomach issues.  CHOLESTEROL MANAGEMENT:  1. Cholesterol and blood sugar are well-controlled on current medications.  2. Continued current cholesterol medication.  FOLLOW-UP PLAN:  1. Follow up in approximately 6 months.

## 2024-06-24 ENCOUNTER — HOSPITAL ENCOUNTER (OUTPATIENT)
Dept: VASCULAR SURGERY | Facility: HOSPITAL | Age: 69
Discharge: HOME | End: 2024-06-24
Payer: MEDICARE

## 2024-06-24 ENCOUNTER — OFFICE VISIT (OUTPATIENT)
Dept: VASCULAR SURGERY | Facility: CLINIC | Age: 69
End: 2024-06-24
Payer: MEDICARE

## 2024-06-24 VITALS — HEART RATE: 70 BPM | SYSTOLIC BLOOD PRESSURE: 142 MMHG | DIASTOLIC BLOOD PRESSURE: 70 MMHG

## 2024-06-24 DIAGNOSIS — I65.23 CAROTID STENOSIS, ASYMPTOMATIC, BILATERAL: Primary | ICD-10-CM

## 2024-06-24 DIAGNOSIS — R09.89 OTHER SPECIFIED SYMPTOMS AND SIGNS INVOLVING THE CIRCULATORY AND RESPIRATORY SYSTEMS: ICD-10-CM

## 2024-06-24 DIAGNOSIS — I65.29 STENOSIS OF CAROTID ARTERY, UNSPECIFIED LATERALITY: ICD-10-CM

## 2024-06-24 DIAGNOSIS — I67.81 ACUTE CEREBROVASCULAR INSUFFICIENCY: ICD-10-CM

## 2024-06-24 DIAGNOSIS — I10 PRIMARY HYPERTENSION: ICD-10-CM

## 2024-06-24 DIAGNOSIS — Z98.890 S/P CAROTID ENDARTERECTOMY: ICD-10-CM

## 2024-06-24 LAB
LEFT CCA DIST DIAS: 25 CM/S
LEFT CCA DIST SYS: 123 CM/S
LEFT CCA MID DIAS: 22 CM/S
LEFT CCA MID SYS: 114 CM/S
LEFT CCA PROX DIAS: 19 CM/S
LEFT CCA PROX SYS: 101 CM/S
LEFT ECA DIAS: 27 CM/S
LEFT ECA SYS: 200 CM/S
LEFT ICA DIST DIAS: 31 CM/S
LEFT ICA DIST SYS: 119 CM/S
LEFT ICA MID DIAS: 43 CM/S
LEFT ICA MID SYS: 166 CM/S
LEFT ICA PROX DIAS: 66 CM/S
LEFT ICA PROX SYS: 236 CM/S
LEFT VERTEBRAL DIAS: 18 CM/S
LEFT VERTEBRAL SYS: 77 CM/S
OHS CV CAROTID RIGHT ICA EDV HIGHEST: 78
OHS CV CAROTID ULTRASOUND LEFT ICA/CCA RATIO: 1.92
OHS CV CAROTID ULTRASOUND RIGHT ICA/CCA RATIO: 4
OHS CV PV CAROTID LEFT HIGHEST CCA: 123
OHS CV PV CAROTID LEFT HIGHEST ICA: 236
OHS CV PV CAROTID RIGHT HIGHEST CCA: 112
OHS CV PV CAROTID RIGHT HIGHEST ICA: 332
OHS CV US CAROTID LEFT HIGHEST EDV: 66
RIGHT CCA DIST DIAS: 20 CM/S
RIGHT CCA DIST SYS: 83 CM/S
RIGHT CCA MID DIAS: 20 CM/S
RIGHT CCA MID SYS: 79 CM/S
RIGHT CCA PROX DIAS: 27 CM/S
RIGHT CCA PROX SYS: 112 CM/S
RIGHT ECA DIAS: 1 CM/S
RIGHT ECA SYS: 171 CM/S
RIGHT ICA DIST DIAS: 26 CM/S
RIGHT ICA DIST SYS: 112 CM/S
RIGHT ICA MID DIAS: 31 CM/S
RIGHT ICA MID SYS: 131 CM/S
RIGHT ICA PROX DIAS: 78 CM/S
RIGHT ICA PROX SYS: 332 CM/S
RIGHT VERTEBRAL DIAS: 16 CM/S
RIGHT VERTEBRAL SYS: 76 CM/S

## 2024-06-24 PROCEDURE — 93880 EXTRACRANIAL BILAT STUDY: CPT | Mod: 26,HCNC,, | Performed by: STUDENT IN AN ORGANIZED HEALTH CARE EDUCATION/TRAINING PROGRAM

## 2024-06-24 PROCEDURE — 1159F MED LIST DOCD IN RCRD: CPT | Mod: HCNC,CPTII,S$GLB, | Performed by: STUDENT IN AN ORGANIZED HEALTH CARE EDUCATION/TRAINING PROGRAM

## 2024-06-24 PROCEDURE — 3078F DIAST BP <80 MM HG: CPT | Mod: HCNC,CPTII,S$GLB, | Performed by: STUDENT IN AN ORGANIZED HEALTH CARE EDUCATION/TRAINING PROGRAM

## 2024-06-24 PROCEDURE — 3044F HG A1C LEVEL LT 7.0%: CPT | Mod: HCNC,CPTII,S$GLB, | Performed by: STUDENT IN AN ORGANIZED HEALTH CARE EDUCATION/TRAINING PROGRAM

## 2024-06-24 PROCEDURE — 4010F ACE/ARB THERAPY RXD/TAKEN: CPT | Mod: HCNC,CPTII,S$GLB, | Performed by: STUDENT IN AN ORGANIZED HEALTH CARE EDUCATION/TRAINING PROGRAM

## 2024-06-24 PROCEDURE — 3077F SYST BP >= 140 MM HG: CPT | Mod: HCNC,CPTII,S$GLB, | Performed by: STUDENT IN AN ORGANIZED HEALTH CARE EDUCATION/TRAINING PROGRAM

## 2024-06-24 PROCEDURE — 99024 POSTOP FOLLOW-UP VISIT: CPT | Mod: HCNC,S$GLB,, | Performed by: STUDENT IN AN ORGANIZED HEALTH CARE EDUCATION/TRAINING PROGRAM

## 2024-06-24 PROCEDURE — 99999 PR PBB SHADOW E&M-EST. PATIENT-LVL III: CPT | Mod: PBBFAC,HCNC,, | Performed by: STUDENT IN AN ORGANIZED HEALTH CARE EDUCATION/TRAINING PROGRAM

## 2024-06-24 PROCEDURE — 1160F RVW MEDS BY RX/DR IN RCRD: CPT | Mod: HCNC,CPTII,S$GLB, | Performed by: STUDENT IN AN ORGANIZED HEALTH CARE EDUCATION/TRAINING PROGRAM

## 2024-06-24 PROCEDURE — 93880 EXTRACRANIAL BILAT STUDY: CPT | Mod: HCNC

## 2024-06-24 RX ORDER — CLOPIDOGREL BISULFATE 75 MG/1
75 TABLET ORAL DAILY
Qty: 90 TABLET | Refills: 3 | Status: SHIPPED | OUTPATIENT
Start: 2024-06-24 | End: 2025-06-24

## 2024-06-24 NOTE — ASSESSMENT & PLAN NOTE
Patient has plaque within proximal ICA at the distal end of the patch. Elevated velocities 332cm/s improved from baseline of 460cm/s. She is asymptomatic. I will let her heal from her right CEA and she is to return to clinic in 3 months with repeat u/s. If velocities stay stable I will observe. If they are worsening she may need carotid stent in the future but first would obtain cta head and neck. For now nothing further and will add plavix for optimal medical therapy.

## 2024-06-24 NOTE — PROGRESS NOTES
Fanta Amos    OFFICE NOTE    DATE OF VISIT: 2024  PATIENT NAME: Emily Ordaz  : 1955  MRN: [id number]  PRIMARY CARE PHYSICIAN: Alonso Cortez MD  CARDIOLOGIST:   REFERRING PROVIDER: Alonso Cortez MD    CHIEF COMPLAINT   Chief Complaint   Patient presents with    Carotid Artery Disease     Patient is s/p right CEA       HISTORY OF PRESENT ILLNESS:  Emily Ordaz is a 68 y.o. female who presents to clinic today for her first post op visit after right CEA. She is doing well overall. She has healed nicely did not require pain medication. She is back to her baseline activity. Has some minimal numbness underneath the right ear. Tolerating diet. Denies any focal neurological symptoms    ALLERGIES:  Review of patient's allergies indicates:   Allergen Reactions    Statins-hmg-coa reductase inhibitors      Leg cramps and pain        PAST MEDICAL HISTORY:  Past Medical History:   Diagnosis Date    Arthritis     Hyperlipidemia     Hypertension     Osteoporosis        PAST SURGICAL HISTORY:  Past Surgical History:   Procedure Laterality Date    BILATERAL TUBAL LIGATION Bilateral     CAROTID ENDARTERECTOMY Right 2024    Procedure: ENDARTERECTOMY, CAROTID;  Surgeon: Fanta Amos MD;  Location: [facility];  Service: Peripheral Vascular;  Laterality: Right;  nti, w/EEG    ORIF FINGER FRACTURE Left        SOCIAL HISTORY:   Social History     Tobacco Use    Smoking status: Former     Current packs/day: 0.50     Average packs/day: 0.5 packs/day for 45.0 years (22.5 ttl pk-yrs)     Types: Cigarettes    Smokeless tobacco: Never    Tobacco comments:     Hold midnight   Substance Use Topics    Alcohol use: Never    Drug use: No       FAMILY HISTORY:  Family History   Problem Relation Name Age of Onset    Breast cancer Paternal Aunt         REVIEW OF SYSTEMS:  ROS  10 pt ros otherwise negative    PHYSICAL EXAM:  Vitals:    24 0916   BP: (!) 142/70   Pulse: 70      Physical Exam       Vss  Gen nad alert oriented  Cv rrr  Lung ctab  Abd soft nt nd  Ext no edema    VASCULAR LAB STUDIES:  Right prox ica 332cm/s consistent with 60-79% stenosis  Left ICA 60-79% stenosis  Improved from baseline 80-99% stenosis on the right but with focal plaque within the ica    ASSESSMENT AND PLAN:  HTN (hypertension)  Medical management    Carotid stenosis, asymptomatic, bilateral  Patient has plaque within proximal ICA at the distal end of the patch. Elevated velocities 332cm/s improved from baseline of 460cm/s. She is asymptomatic. I will let her heal from her right CEA and she is to return to clinic in 3 months with repeat u/s. If velocities stay stable I will observe. If they are worsening she may need carotid stent in the future but first would obtain cta head and neck. For now nothing further and will add plavix for optimal medical therapy.      Emily was seen today for carotid artery disease.    Diagnoses and all orders for this visit:    Carotid stenosis, asymptomatic, bilateral    Stenosis of carotid artery, unspecified laterality  -     CV Ultrasound Bilateral Doppler Carotid; Future    Acute cerebrovascular insufficiency  -     CV Ultrasound Bilateral Doppler Carotid; Future    Primary hypertension    S/P carotid endarterectomy    Other orders  -     clopidogreL (PLAVIX) 75 mg tablet; Take 1 tablet (75 mg total) by mouth once daily.        No follow-ups on file.        Fanta Amos  [facility]  Vascular Surgery  [phone] (Clinic Number)

## 2024-07-18 ENCOUNTER — PATIENT MESSAGE (OUTPATIENT)
Dept: ADMINISTRATIVE | Facility: OTHER | Age: 69
End: 2024-07-18
Payer: MEDICARE

## 2024-07-24 ENCOUNTER — OFFICE VISIT (OUTPATIENT)
Dept: FAMILY MEDICINE | Facility: CLINIC | Age: 69
End: 2024-07-24
Payer: MEDICARE

## 2024-07-24 VITALS
HEIGHT: 64 IN | SYSTOLIC BLOOD PRESSURE: 130 MMHG | OXYGEN SATURATION: 99 % | WEIGHT: 130.19 LBS | HEART RATE: 83 BPM | DIASTOLIC BLOOD PRESSURE: 69 MMHG | RESPIRATION RATE: 18 BRPM | BODY MASS INDEX: 22.23 KG/M2

## 2024-07-24 DIAGNOSIS — R50.9 FEVER, UNSPECIFIED FEVER CAUSE: Primary | ICD-10-CM

## 2024-07-24 DIAGNOSIS — U07.1 ACUTE COVID-19: ICD-10-CM

## 2024-07-24 LAB
CTP QC/QA: YES
SARS-COV-2 RDRP RESP QL NAA+PROBE: POSITIVE

## 2024-07-24 PROCEDURE — 3044F HG A1C LEVEL LT 7.0%: CPT | Mod: HCNC,CPTII,S$GLB, | Performed by: FAMILY MEDICINE

## 2024-07-24 PROCEDURE — 99213 OFFICE O/P EST LOW 20 MIN: CPT | Mod: HCNC,S$GLB,, | Performed by: FAMILY MEDICINE

## 2024-07-24 PROCEDURE — 1101F PT FALLS ASSESS-DOCD LE1/YR: CPT | Mod: HCNC,CPTII,S$GLB, | Performed by: FAMILY MEDICINE

## 2024-07-24 PROCEDURE — 3075F SYST BP GE 130 - 139MM HG: CPT | Mod: HCNC,CPTII,S$GLB, | Performed by: FAMILY MEDICINE

## 2024-07-24 PROCEDURE — 87635 SARS-COV-2 COVID-19 AMP PRB: CPT | Mod: QW,HCNC,S$GLB, | Performed by: FAMILY MEDICINE

## 2024-07-24 PROCEDURE — 4010F ACE/ARB THERAPY RXD/TAKEN: CPT | Mod: HCNC,CPTII,S$GLB, | Performed by: FAMILY MEDICINE

## 2024-07-24 PROCEDURE — 3288F FALL RISK ASSESSMENT DOCD: CPT | Mod: HCNC,CPTII,S$GLB, | Performed by: FAMILY MEDICINE

## 2024-07-24 PROCEDURE — 3008F BODY MASS INDEX DOCD: CPT | Mod: HCNC,CPTII,S$GLB, | Performed by: FAMILY MEDICINE

## 2024-07-24 PROCEDURE — 3078F DIAST BP <80 MM HG: CPT | Mod: HCNC,CPTII,S$GLB, | Performed by: FAMILY MEDICINE

## 2024-07-24 PROCEDURE — 99999 PR PBB SHADOW E&M-EST. PATIENT-LVL III: CPT | Mod: PBBFAC,HCNC,, | Performed by: FAMILY MEDICINE

## 2024-07-24 PROCEDURE — 1126F AMNT PAIN NOTED NONE PRSNT: CPT | Mod: HCNC,CPTII,S$GLB, | Performed by: FAMILY MEDICINE

## 2024-07-24 NOTE — PROGRESS NOTES
Chief Complaint:    Chief Complaint   Patient presents with    Cough       History of Present Illness:    History of Present Illness    CHIEF COMPLAINT:  The patient presents with mild COVID-19 symptoms including a low-grade fever, cough, and feeling clammy since Monday, after testing positive for COVID-19.    HPI:  The patient reports being sick since Monday with a low-grade fever yesterday but no fever this morning, just feeling clammy. She has had an aggravating cough for about a week with a little sinus discomfort. She denies any trouble breathing. One ear hurt a little bit, possibly due to fluid. She recently started taking Plavix on Monday after having an endarterectomy on May 30th.    The patient denies any sore throat, runny nose, headache, or loss of smell or taste. She denies any bowel issues.    MEDICATIONS:  Plavix started on Monday after endarterectomy on May 30th, to be taken for 1-2 years.    SURGICAL HISTORY:  Endarterectomy: May 30th.           Past Medical History:   Diagnosis Date    Arthritis     Hyperlipidemia     Hypertension     Osteoporosis        Social History:  Social History     Socioeconomic History    Marital status:    Tobacco Use    Smoking status: Former     Current packs/day: 0.50     Average packs/day: 0.5 packs/day for 45.0 years (22.5 ttl pk-yrs)     Types: Cigarettes    Smokeless tobacco: Never    Tobacco comments:     Hold midnight   Substance and Sexual Activity    Alcohol use: Never    Drug use: No    Sexual activity: Never     Partners: Male     Birth control/protection: None     Social Determinants of Health     Financial Resource Strain: Low Risk  (1/4/2024)    Overall Financial Resource Strain (CARDIA)     Difficulty of Paying Living Expenses: Not hard at all   Food Insecurity: No Food Insecurity (1/4/2024)    Hunger Vital Sign     Worried About Running Out of Food in the Last Year: Never true     Ran Out of Food in the Last Year: Never true   Transportation  "Needs: No Transportation Needs (1/4/2024)    PRAPARE - Transportation     Lack of Transportation (Medical): No     Lack of Transportation (Non-Medical): No   Physical Activity: Inactive (1/4/2024)    Exercise Vital Sign     Days of Exercise per Week: 0 days     Minutes of Exercise per Session: 0 min   Stress: No Stress Concern Present (1/4/2024)    [employer] - Occupational Stress Questionnaire     Feeling of Stress : Only a little   Housing Stability: Low Risk  (1/4/2024)    Housing Stability Vital Sign     Unable to Pay for Housing in the Last Year: No     Number of Places Lived in the Last Year: 1     Unstable Housing in the Last Year: No       Family History:   family history includes Breast cancer in her paternal aunt.    Health Maintenance   Topic Date Due    Shingles Vaccine (1 of 2) Never done    Mammogram  11/29/2024    Colorectal Cancer Screening  12/09/2024    Lipid Panel  04/08/2025    DEXA Scan  09/22/2025    TETANUS VACCINE  02/26/2028    Hepatitis C Screening  Completed       Exam:Physical     Vital Signs  Pulse: 83  Resp: 18  SpO2: 99 %  BP: 130/69  Pain Score: 0-No pain  Height and Weight  Height: 5' 4" (162.6 cm)  Weight: 59.1 kg (130 lb 2.9 oz)  BSA (Calculated - sq m): 1.63 sq meters  BMI (Calculated): 22.3  Weight in (lb) to have BMI = 25: 145.3]    Body mass index is 22.35 kg/m².    Physical Exam    General: No acute distress. Well-developed. Well-nourished.  Eyes: EOMI. Sclerae anicteric.  HENT: Normocephalic. Atraumatic. Nares patent. Moist oral mucosa.  Cardiovascular: Regular rate. Regular rhythm. No murmurs. No rubs. No gallops. Normal S1, S2.  Respiratory: Normal respiratory effort. Clear to auscultation bilaterally. No rales. No rhonchi. No wheezing.  Musculoskeletal: No  obvious deformity.  Extremities: No lower extremity edema.  Neurological: Alert & oriented x3. No slurred speech. Normal gait.  Psychiatric: Normal mood. Normal affect. Good insight. Good " judgment.  Skin: Warm. Dry. No rash.           Assessment:        ICD-10-CM ICD-9-CM   1. Fever, unspecified fever cause  R50.9 780.60   2. Acute COVID-19  U07.1 079.89         Plan:    Assessment & Plan    - Patient has mild COVID-19 based on symptoms of low-grade fever, cough for 1 week, and feeling clammy.  - Exam and oxygen level are normal.  - Paxlovid not indicated due to mild symptoms and medication side effects/shortage.  - Symptomatic treatment recommended.  - Advised caution with NSAIDs like ibuprofen and naproxen due to patient's recent Plavix (clopidogrel) initiation after endarterectomy on 5/30.  - Small increased bleeding risk if taken together.  - Educated on typical mild COVID-19 disease course and expected symptom improvement in 4-5 days.  - Discussed to monitor for worsening symptoms like difficulty breathing or dropping oxygen levels, which would warrant going to the hospital.  - Patient to stay indoors and isolate while symptomatic with COVID-19.  - Patient to wear a mask for an additional 4-5 days once symptoms resolve, when going out.  - Recommend acetaminophen as needed for symptom relief instead of ibuprofen or naproxen due to recent start of Plavix (clopidogrel) after surgery.         Emily was seen today for cough.    Diagnoses and all orders for this visit:    Fever, unspecified fever cause  -     POCT COVID-19 Rapid Screening    Acute COVID-19        No follow-ups on file.      Alonso Cortez MD

## 2024-07-25 RX ORDER — ALENDRONATE SODIUM 70 MG/1
TABLET ORAL
Qty: 12 TABLET | Refills: 3 | Status: SHIPPED | OUTPATIENT
Start: 2024-07-25

## 2024-07-25 RX ORDER — METOPROLOL SUCCINATE 25 MG/1
TABLET, EXTENDED RELEASE ORAL
Qty: 45 TABLET | Refills: 3 | Status: SHIPPED | OUTPATIENT
Start: 2024-07-25

## 2024-07-25 RX ORDER — POTASSIUM CHLORIDE 750 MG/1
CAPSULE, EXTENDED RELEASE ORAL
Qty: 180 CAPSULE | Refills: 3 | Status: SHIPPED | OUTPATIENT
Start: 2024-07-25

## 2024-07-25 NOTE — TELEPHONE ENCOUNTER
Refill Routing Note   Medication(s) are not appropriate for processing by [facility] for the following reason(s):        Outside of protocol    [facility] action(s):  Approve  Route   Requires labs : Yes             Appointments  past 12m or future 3m with PCP    Date Provider   Last Visit   7/24/2024 Alonso Cortez MD   Next Visit   12/20/2024 Alonso Cortez MD   ED visits in past 90 days: 0        Note composed:1:14 PM 07/25/2024

## 2024-07-25 NOTE — TELEPHONE ENCOUNTER
Care Due:                  Date            Visit Type   Department     Provider  --------------------------------------------------------------------------------                                EP -                              [facility]  Last Visit: 07-      CARE ([facility])   MEDICINE       Alonso Cortez                              [facility]  Next Visit: 12-      CARE ([facility])   [facility]  Die[place]                                                            Last  Test          Frequency    Reason                     Performed    Due Date  --------------------------------------------------------------------------------    Mg Level....  12 months..  alendronate..............  Not Found    Overdue    Phosphate...  12 months..  alendronate..............  Not Found    Overdue    Vitamin D...  12 months..  alendronate..............  Not Found    Overdue    [employer] Embedded Care Due Messages. Reference number: [id number].   7/25/2024 8:04:31 AM CDT

## 2024-08-09 DIAGNOSIS — M79.10 MYALGIA DUE TO HMG COA REDUCTASE INHIBITOR: ICD-10-CM

## 2024-08-09 DIAGNOSIS — E78.2 MIXED HYPERLIPIDEMIA: ICD-10-CM

## 2024-08-09 DIAGNOSIS — T46.6X5A MYALGIA DUE TO HMG COA REDUCTASE INHIBITOR: ICD-10-CM

## 2024-08-09 RX ORDER — EVOLOCUMAB 140 MG/ML
140 INJECTION, SOLUTION SUBCUTANEOUS
Qty: 2 ML | Refills: 2 | Status: SHIPPED | OUTPATIENT
Start: 2024-08-09 | End: 2024-11-07

## 2024-08-13 ENCOUNTER — HOSPITAL ENCOUNTER (OUTPATIENT)
Dept: RADIOLOGY | Facility: HOSPITAL | Age: 69
Discharge: HOME | End: 2024-08-13
Attending: FAMILY MEDICINE
Payer: MEDICARE

## 2024-08-13 DIAGNOSIS — R92.8 ABNORMAL MAMMOGRAM: ICD-10-CM

## 2024-08-13 PROCEDURE — 77065 DX MAMMO INCL CAD UNI: CPT | Mod: 26,HCNC,LT, | Performed by: RADIOLOGY

## 2024-08-13 PROCEDURE — 77061 BREAST TOMOSYNTHESIS UNI: CPT | Mod: 26,HCNC,LT, | Performed by: RADIOLOGY

## 2024-08-13 PROCEDURE — 77065 DX MAMMO INCL CAD UNI: CPT | Mod: TC,HCNC,LT

## 2024-08-13 PROCEDURE — 76642 ULTRASOUND BREAST LIMITED: CPT | Mod: TC,HCNC,LT

## 2024-08-13 PROCEDURE — 76642 ULTRASOUND BREAST LIMITED: CPT | Mod: 26,HCNC,LT, | Performed by: RADIOLOGY

## 2024-09-17 ENCOUNTER — HOSPITAL ENCOUNTER (EMERGENCY)
Facility: HOSPITAL | Age: 69
Discharge: HOME | End: 2024-09-17
Attending: EMERGENCY MEDICINE
Payer: MEDICARE

## 2024-09-17 VITALS
HEART RATE: 74 BPM | RESPIRATION RATE: 16 BRPM | TEMPERATURE: 98 F | OXYGEN SATURATION: 100 % | SYSTOLIC BLOOD PRESSURE: 165 MMHG | DIASTOLIC BLOOD PRESSURE: 72 MMHG

## 2024-09-17 DIAGNOSIS — R42 DIZZINESS: Primary | ICD-10-CM

## 2024-09-17 PROCEDURE — 99283 EMERGENCY DEPT VISIT LOW MDM: CPT | Mod: HCNC

## 2024-09-17 RX ORDER — MECLIZINE HYDROCHLORIDE 25 MG/1
25 TABLET ORAL 3 TIMES DAILY PRN
Qty: 9 TABLET | Refills: 0 | Status: SHIPPED | OUTPATIENT
Start: 2024-09-17

## 2024-09-17 NOTE — ED PROVIDER NOTES
SCRIBE #1 NOTE: I, Me-Catalina oJhnston, am scribing for, and in the presence of, Marc Guzman DO. I have scribed the entire note.       History     Chief Complaint   Patient presents with    Dizziness     AASI reports sudden onset dizziness while sitting in a chair followed by nausea, vomiting and diarrhea.      Review of patient's allergies indicates:   Allergen Reactions    Statins-hmg-coa reductase inhibitors      Leg cramps and pain          History of Present Illness     HPI    9/17/2024, 6:11 PM  History obtained from the patient      History of Present Illness: Emily Ordaz is a 69 y.o. female patient with a PMHx of arthritis, HLD, HTN, and osteoporosis who presents to the Emergency Department for evaluation of room-spinning dizziness which onset earlier today. Patient states that she was sitting on her recliner and was looking down on her phone when she suddenly experienced room-spinning dizziness. Symptoms were moderate in severity. Patient states movement made symptoms worse. Patient reports a couple episodes of vomiting and nausea during the episode. Other associated sxs include nervousness and diarrhea. At this time, patient states dizziness has resolved. Patient denies any headache, vision changes, and all other sxs at this time. No prior Tx reported. Patient reports a similar episode 3 days ago while working outside. No further complaints or concerns at this time.       Arrival mode: [facility]    PCP: Alonso Cortez MD        Past Medical History:  Past Medical History:   Diagnosis Date    Arthritis     Hyperlipidemia     Hypertension     Osteoporosis        Past Surgical History:  Past Surgical History:   Procedure Laterality Date    BILATERAL TUBAL LIGATION Bilateral 1994    CAROTID ENDARTERECTOMY Right 5/30/2024    Procedure: ENDARTERECTOMY, CAROTID;  Surgeon: Fanta Amos MD;  Location: [facility];  Service: Peripheral Vascular;  Laterality: Right;  nti, w/EEG    ORIF FINGER FRACTURE Left  1987         Family History:  Family History   Problem Relation Name Age of Onset    Breast cancer Paternal Aunt         Social History:  Social History     Tobacco Use    Smoking status: Former     Current packs/day: 0.50     Average packs/day: 0.5 packs/day for 45.0 years (22.5 ttl pk-yrs)     Types: Cigarettes    Smokeless tobacco: Never    Tobacco comments:     Hold midnight   Substance and Sexual Activity    Alcohol use: Never    Drug use: No    Sexual activity: Never     Partners: Male     Birth control/protection: None        Review of Systems     Review of Systems   Eyes:         (-) vision changes   Gastrointestinal:  Positive for diarrhea, nausea and vomiting.   Neurological:  Positive for dizziness (room-spinning). Negative for headaches.   Psychiatric/Behavioral:  The patient is nervous/anxious.         Physical Exam     Initial Vitals [09/17/24 1651]   BP Pulse Resp Temp SpO2   (!) 184/80 90 16 97.6 °F (36.4 °C) 97 %      MAP       --          Physical Exam  Vitals reviewed.   Constitutional:       Appearance: Normal appearance.   Cardiovascular:      Rate and Rhythm: Normal rate and regular rhythm.      Heart sounds: No murmur heard.  Pulmonary:      Breath sounds: Normal breath sounds. No wheezing.   Abdominal:      Palpations: Abdomen is soft.      Tenderness: There is no abdominal tenderness.   Musculoskeletal:         General: Normal range of motion.   Skin:     General: Skin is warm and dry.      Findings: No rash.   Neurological:      General: No focal deficit present.      Mental Status: She is alert and oriented to person, place, and time.      Sensory: No sensory deficit.      Motor: No weakness.      Comments: Good finger-to-nose and heel-to-shin test          ED Course   Procedures  ED Vital Signs:  Vitals:    09/17/24 1651 09/17/24 1721 09/17/24 1723 09/17/24 1841   BP: (!) 184/80 (!) 154/65  (!) 167/72   Pulse: 90  78 72   Resp: 16  18 15   Temp: 97.6 °F (36.4 °C)      SpO2: 97%  99% 100%     09/17/24 1909 09/17/24 1932 09/17/24 1947   BP: (!) 150/66 (!) 165/72    Pulse: 77 74    Resp: 13  16   Temp:      SpO2: 100% 100%        Abnormal Lab Results:  Labs Reviewed - No data to display     All Lab Results:  Results for orders placed or performed in visit on 07/24/24   POCT COVID-19 Rapid Screening   Result Value Ref Range    POC Rapid COVID Positive (A) Negative     Acceptable Yes          Imaging Results:  Imaging Results    None                 The Emergency Provider reviewed the vital signs and test results, which are outlined above.     ED Discussion       7:40 PM: Reassessed pt at this time. Discussed with pt all pertinent ED information and results. Discussed pt dx and plan of tx. Gave pt all f/u and return to the ED instructions. All questions and concerns were addressed at this time. Pt expresses understanding of information and instructions, and is comfortable with plan to discharge. Pt is stable for discharge.    I discussed with patient and/or family/caretaker that evaluation in the ED does not suggest any emergent or life threatening medical conditions requiring immediate intervention beyond what was provided in the ED, and I believe patient is safe for discharge.  Regardless, an unremarkable evaluation in the ED does not preclude the development or presence of a serious of life threatening condition. As such, patient was instructed to return immediately for any worsening or change in current symptoms.         Medical Decision Making  Patient ambulated around the emergency department with no dizziness, no difficulty, and no assistance.  She states she has no acute complaints.  Instructed to return immediately for any new or worsening symptoms and she verbalized understanding.    Risk  Risk Details: Differential diagnosis includes but is not limited to:  Peripheral vertigo, central vertigo                ED Medication(s):  Medications - No data to display    Discharge  Medication List as of 9/17/2024  7:33 PM        START taking these medications    Details   meclizine (ANTIVERT) 25 mg tablet Take 1 tablet (25 mg total) by mouth 3 (three) times daily as needed for Dizziness., Starting Tue 9/17/2024, Normal              Follow-up Information       Schedule an appointment as soon as possible for a visit  with Alonso Cortez MD.    Specialty: Family Medicine  Contact information:  [address] [address] [address] [phone]                                 Scribe Attestation:   Scribe #1: I performed the above scribed service and the documentation accurately describes the services I performed. I attest to the accuracy of the note.     Attending:   Physician Attestation Statement for Scribe #1: I, Marc Guzman DO, personally performed the services described in this documentation, as scribed by Virginia Johnston, in my presence, and it is both accurate and complete.           Clinical Impression       ICD-10-CM ICD-9-CM   1. Dizziness  R42 780.4       Disposition:   Disposition: Discharged  Condition: Stable         Marc Guzman DO  09/17/24 7218

## 2024-09-18 ENCOUNTER — TELEPHONE (OUTPATIENT)
Dept: FAMILY MEDICINE | Facility: CLINIC | Age: 69
End: 2024-09-18
Payer: MEDICARE

## 2024-09-18 NOTE — TELEPHONE ENCOUNTER
----- Message from Jorge Alberto Cochran MA sent at 9/18/2024 10:04 AM CDT -----  Contact: chris@ [phone]  Pt called                  Pt is requesting a call back to speak with staff in regards to needing a ED follow up for positional vertigo within this week if possible.

## 2024-09-18 NOTE — ED NOTES
Orthostatic VS not performed. Pt ambulatory without difficulty/dizziness. MD gave verbal to d/c orthostatic VS order.

## 2024-09-23 ENCOUNTER — HOSPITAL ENCOUNTER (OUTPATIENT)
Dept: VASCULAR SURGERY | Facility: HOSPITAL | Age: 69
Discharge: HOME | End: 2024-09-23
Attending: STUDENT IN AN ORGANIZED HEALTH CARE EDUCATION/TRAINING PROGRAM
Payer: MEDICARE

## 2024-09-23 ENCOUNTER — OFFICE VISIT (OUTPATIENT)
Dept: VASCULAR SURGERY | Facility: CLINIC | Age: 69
End: 2024-09-23
Payer: MEDICARE

## 2024-09-23 VITALS — DIASTOLIC BLOOD PRESSURE: 70 MMHG | SYSTOLIC BLOOD PRESSURE: 172 MMHG

## 2024-09-23 DIAGNOSIS — I67.81 ACUTE CEREBROVASCULAR INSUFFICIENCY: ICD-10-CM

## 2024-09-23 DIAGNOSIS — Z98.890 S/P CAROTID ENDARTERECTOMY: ICD-10-CM

## 2024-09-23 DIAGNOSIS — I65.29 STENOSIS OF CAROTID ARTERY, UNSPECIFIED LATERALITY: ICD-10-CM

## 2024-09-23 DIAGNOSIS — I65.23 CAROTID STENOSIS, ASYMPTOMATIC, BILATERAL: Primary | ICD-10-CM

## 2024-09-23 DIAGNOSIS — I10 PRIMARY HYPERTENSION: ICD-10-CM

## 2024-09-23 LAB
LEFT ARM DIASTOLIC BLOOD PRESSURE: 70 MMHG
LEFT ARM SYSTOLIC BLOOD PRESSURE: 172 MMHG
LEFT CCA DIST DIAS: 24 CM/S
LEFT CCA DIST SYS: 110 CM/S
LEFT CCA MID DIAS: 19 CM/S
LEFT CCA MID SYS: 96 CM/S
LEFT CCA PROX DIAS: 19 CM/S
LEFT CCA PROX SYS: 89 CM/S
LEFT ECA DIAS: 22 CM/S
LEFT ECA SYS: 230 CM/S
LEFT ICA DIST DIAS: 33 CM/S
LEFT ICA DIST SYS: 133 CM/S
LEFT ICA MID DIAS: 36 CM/S
LEFT ICA MID SYS: 148 CM/S
LEFT ICA PROX DIAS: 61 CM/S
LEFT ICA PROX SYS: 227 CM/S
LEFT VERTEBRAL DIAS: 11 CM/S
LEFT VERTEBRAL SYS: 55 CM/S
OHS CV CAROTID RIGHT ICA EDV HIGHEST: 66
OHS CV CAROTID ULTRASOUND LEFT ICA/CCA RATIO: 2.06
OHS CV CAROTID ULTRASOUND RIGHT ICA/CCA RATIO: 2.51
OHS CV PV CAROTID LEFT HIGHEST CCA: 110
OHS CV PV CAROTID LEFT HIGHEST ICA: 227
OHS CV PV CAROTID RIGHT HIGHEST CCA: 115
OHS CV PV CAROTID RIGHT HIGHEST ICA: 281
OHS CV US CAROTID LEFT HIGHEST EDV: 61
RIGHT ARM DIASTOLIC BLOOD PRESSURE: 78 MMHG
RIGHT ARM SYSTOLIC BLOOD PRESSURE: 180 MMHG
RIGHT CCA DIST DIAS: 34 CM/S
RIGHT CCA DIST SYS: 112 CM/S
RIGHT CCA MID DIAS: 28 CM/S
RIGHT CCA MID SYS: 115 CM/S
RIGHT CCA PROX DIAS: 19 CM/S
RIGHT CCA PROX SYS: 77 CM/S
RIGHT ECA DIAS: 23 CM/S
RIGHT ECA SYS: 260 CM/S
RIGHT ICA MID DIAS: 37 CM/S
RIGHT ICA MID SYS: 150 CM/S
RIGHT ICA PROX DIAS: 66 CM/S
RIGHT ICA PROX SYS: 281 CM/S
RIGHT VERTEBRAL DIAS: 17 CM/S
RIGHT VERTEBRAL SYS: 72 CM/S

## 2024-09-23 PROCEDURE — 1160F RVW MEDS BY RX/DR IN RCRD: CPT | Mod: HCNC,CPTII,S$GLB, | Performed by: STUDENT IN AN ORGANIZED HEALTH CARE EDUCATION/TRAINING PROGRAM

## 2024-09-23 PROCEDURE — 3044F HG A1C LEVEL LT 7.0%: CPT | Mod: HCNC,CPTII,S$GLB, | Performed by: STUDENT IN AN ORGANIZED HEALTH CARE EDUCATION/TRAINING PROGRAM

## 2024-09-23 PROCEDURE — 1159F MED LIST DOCD IN RCRD: CPT | Mod: HCNC,CPTII,S$GLB, | Performed by: STUDENT IN AN ORGANIZED HEALTH CARE EDUCATION/TRAINING PROGRAM

## 2024-09-23 PROCEDURE — 4010F ACE/ARB THERAPY RXD/TAKEN: CPT | Mod: HCNC,CPTII,S$GLB, | Performed by: STUDENT IN AN ORGANIZED HEALTH CARE EDUCATION/TRAINING PROGRAM

## 2024-09-23 PROCEDURE — 99999 PR PBB SHADOW E&M-EST. PATIENT-LVL III: CPT | Mod: PBBFAC,HCNC,, | Performed by: STUDENT IN AN ORGANIZED HEALTH CARE EDUCATION/TRAINING PROGRAM

## 2024-09-23 PROCEDURE — 3077F SYST BP >= 140 MM HG: CPT | Mod: HCNC,CPTII,S$GLB, | Performed by: STUDENT IN AN ORGANIZED HEALTH CARE EDUCATION/TRAINING PROGRAM

## 2024-09-23 PROCEDURE — 93880 EXTRACRANIAL BILAT STUDY: CPT | Mod: 26,HCNC,, | Performed by: STUDENT IN AN ORGANIZED HEALTH CARE EDUCATION/TRAINING PROGRAM

## 2024-09-23 PROCEDURE — 93880 EXTRACRANIAL BILAT STUDY: CPT | Mod: HCNC

## 2024-09-23 PROCEDURE — 3078F DIAST BP <80 MM HG: CPT | Mod: HCNC,CPTII,S$GLB, | Performed by: STUDENT IN AN ORGANIZED HEALTH CARE EDUCATION/TRAINING PROGRAM

## 2024-09-23 PROCEDURE — 99214 OFFICE O/P EST MOD 30 MIN: CPT | Mod: HCNC,S$GLB,, | Performed by: STUDENT IN AN ORGANIZED HEALTH CARE EDUCATION/TRAINING PROGRAM

## 2024-09-23 RX ORDER — MECLIZINE HYDROCHLORIDE 25 MG/1
25 TABLET ORAL 3 TIMES DAILY PRN
Qty: 60 TABLET | Refills: 2 | Status: SHIPPED | OUTPATIENT
Start: 2024-09-23

## 2024-09-23 NOTE — ASSESSMENT & PLAN NOTE
Patient doing well. No focal deficits. Still has 60-79% stenosis on the right ica but velocities are improved from previous exam today they are 281cm/s. She is asymptomatic. Recommend continuing aspirin plavix and statin therapy. If velocities worsen she may need carotid stent, first step would be to obtain cta head and neck. She has some complaints of dizziness that lead her to the ED 6 days ago when she turned her head up and had dizziness, this is attributed to positional vertigo she was given script for meclizine. I do not think her dizziness is related to her carotid stenosis

## 2024-09-23 NOTE — PROGRESS NOTES
Fanta Amos    OFFICE NOTE    DATE OF VISIT: 2024  PATIENT NAME: Emily Ordaz  : 1955  MRN: [id number]  PRIMARY CARE PHYSICIAN: Alonso Cortez MD  CARDIOLOGIST:   REFERRING PROVIDER: No ref. provider found    CHIEF COMPLAINT   Chief Complaint   Patient presents with    Carotid Artery Disease     Patient in for 3 month follow up. Patient denies any symptoms at this time.       HISTORY OF PRESENT ILLNESS:  Emily Ordaz is a 69 y.o. female who presents to clinic today for post visit. She had carotid surgery done about 4 months ago. She is doing well from surgical standpoint no gross focal deficits. She did have an episode of dizziness and ended up in ED this happened after looking up suddenly so it was attributed to benign positional vertigo and she was sent home with meclizine. She denies any further episodes and this has resolved completely. Unfortunately her daughter passed away in August this year due to a long standing history of drug use. Patient has been taking care of her 3 grown grand children for several years now.    ALLERGIES:  Review of patient's allergies indicates:   Allergen Reactions    Statins-hmg-coa reductase inhibitors      Leg cramps and pain        PAST MEDICAL HISTORY:  Past Medical History:   Diagnosis Date    Arthritis     Hyperlipidemia     Hypertension     Osteoporosis        PAST SURGICAL HISTORY:  Past Surgical History:   Procedure Laterality Date    BILATERAL TUBAL LIGATION Bilateral     CAROTID ENDARTERECTOMY Right 2024    Procedure: ENDARTERECTOMY, CAROTID;  Surgeon: Fanta Amos MD;  Location: [facility];  Service: Peripheral Vascular;  Laterality: Right;  nti, w/EEG    ORIF FINGER FRACTURE Left        SOCIAL HISTORY:   Social History     Tobacco Use    Smoking status: Former     Current packs/day: 0.50     Average packs/day: 0.5 packs/day for 45.0 years (22.5 ttl pk-yrs)     Types: Cigarettes    Smokeless tobacco: Never    Tobacco comments:      Hold midnight   Substance Use Topics    Alcohol use: Never    Drug use: No       FAMILY HISTORY:  Family History   Problem Relation Name Age of Onset    Breast cancer Paternal Aunt         REVIEW OF SYSTEMS:  ROS  10 pt ros otherwise negative    PHYSICAL EXAM:  Vitals:    09/23/24 1012   BP: (!) 172/70      Physical Exam      Vss  Gen nad alert oriented  Cv rrr  Abd soft nt nd    VASCULAR LAB STUDIES:  60-79% stenosis right with psv 281cm/s  Left ica 60-79% stenosis psv 227cm/s area of calcium is beyond the patch causing elevated velocities    ASSESSMENT AND PLAN:  S/P carotid endarterectomy  Patient doing well. No focal deficits. Still has 60-79% stenosis on the right ica but velocities are improved from previous exam today they are 281cm/s. She is asymptomatic. Recommend continuing aspirin plavix and statin therapy. If velocities worsen she may need carotid stent, first step would be to obtain cta head and neck. She has some complaints of dizziness that lead her to the ED 6 days ago when she turned her head up and had dizziness, this is attributed to positional vertigo she was given script for meclizine. I do not think her dizziness is related to her carotid stenosis    HTN (hypertension)  Medical management    Emily was seen today for carotid artery disease.    Diagnoses and all orders for this visit:    Carotid stenosis, asymptomatic, bilateral    S/P carotid endarterectomy    Primary hypertension        No follow-ups on file.        Fanta Amos  [facility]  Vascular Surgery  [phone] (Clinic Number)

## 2024-10-23 RX ORDER — TRIAMTERENE/HYDROCHLOROTHIAZID 37.5-25 MG
TABLET ORAL
Qty: 45 TABLET | Refills: 3 | Status: SHIPPED | OUTPATIENT
Start: 2024-10-23

## 2024-10-23 RX ORDER — LOSARTAN POTASSIUM 100 MG/1
100 TABLET ORAL
Qty: 90 TABLET | Refills: 3 | Status: SHIPPED | OUTPATIENT
Start: 2024-10-23

## 2024-10-23 RX ORDER — CITALOPRAM 20 MG/1
20 TABLET, FILM COATED ORAL
Qty: 90 TABLET | Refills: 3 | Status: SHIPPED | OUTPATIENT
Start: 2024-10-23

## 2024-10-23 NOTE — TELEPHONE ENCOUNTER
Care Due:                  Date            Visit Type   Department     Provider  --------------------------------------------------------------------------------                                EP -                              [facility]  Last Visit: 07-      CARE ([facility])   MEDICINE       Alonso Cortez                              [facility]  Next Visit: 12-      CARE ([facility])   [facility]                                                            Last  Test          Frequency    Reason                     Performed    Due Date  --------------------------------------------------------------------------------    Mg Level....  12 months..  alendronate..............  Not Found    Overdue    Phosphate...  12 months..  alendronate..............  Not Found    Overdue    [employer] Embedded Care Due Messages. Reference number: [id number].   10/23/2024 8:04:12 AM CDT

## 2024-10-24 ENCOUNTER — PATIENT OUTREACH (OUTPATIENT)
Dept: ADMINISTRATIVE | Facility: HOSPITAL | Age: 69
End: 2024-10-24
Payer: MEDICARE

## 2024-11-14 ENCOUNTER — OFFICE VISIT (OUTPATIENT)
Dept: CARDIOLOGY | Facility: CLINIC | Age: 69
End: 2024-11-14
Payer: MEDICARE

## 2024-11-14 VITALS
BODY MASS INDEX: 22.08 KG/M2 | OXYGEN SATURATION: 98 % | SYSTOLIC BLOOD PRESSURE: 140 MMHG | HEART RATE: 91 BPM | DIASTOLIC BLOOD PRESSURE: 78 MMHG | WEIGHT: 128.63 LBS

## 2024-11-14 DIAGNOSIS — Z78.9 STATIN INTOLERANCE: ICD-10-CM

## 2024-11-14 DIAGNOSIS — I10 PRIMARY HYPERTENSION: Primary | ICD-10-CM

## 2024-11-14 DIAGNOSIS — Z98.890 S/P CAROTID ENDARTERECTOMY: ICD-10-CM

## 2024-11-14 DIAGNOSIS — I70.0 AORTIC ATHEROSCLEROSIS: ICD-10-CM

## 2024-11-14 DIAGNOSIS — F17.200 SMOKER: ICD-10-CM

## 2024-11-14 DIAGNOSIS — I65.21 STENOSIS OF RIGHT CAROTID ARTERY: ICD-10-CM

## 2024-11-14 PROCEDURE — 3288F FALL RISK ASSESSMENT DOCD: CPT | Mod: HCNC,CPTII,S$GLB, | Performed by: INTERNAL MEDICINE

## 2024-11-14 PROCEDURE — 1159F MED LIST DOCD IN RCRD: CPT | Mod: HCNC,CPTII,S$GLB, | Performed by: INTERNAL MEDICINE

## 2024-11-14 PROCEDURE — 99214 OFFICE O/P EST MOD 30 MIN: CPT | Mod: HCNC,S$GLB,, | Performed by: INTERNAL MEDICINE

## 2024-11-14 PROCEDURE — 99999 PR PBB SHADOW E&M-EST. PATIENT-LVL III: CPT | Mod: PBBFAC,HCNC,, | Performed by: INTERNAL MEDICINE

## 2024-11-14 PROCEDURE — 3008F BODY MASS INDEX DOCD: CPT | Mod: HCNC,CPTII,S$GLB, | Performed by: INTERNAL MEDICINE

## 2024-11-14 PROCEDURE — 4010F ACE/ARB THERAPY RXD/TAKEN: CPT | Mod: HCNC,CPTII,S$GLB, | Performed by: INTERNAL MEDICINE

## 2024-11-14 PROCEDURE — 3044F HG A1C LEVEL LT 7.0%: CPT | Mod: HCNC,CPTII,S$GLB, | Performed by: INTERNAL MEDICINE

## 2024-11-14 PROCEDURE — 3077F SYST BP >= 140 MM HG: CPT | Mod: HCNC,CPTII,S$GLB, | Performed by: INTERNAL MEDICINE

## 2024-11-14 PROCEDURE — 3078F DIAST BP <80 MM HG: CPT | Mod: HCNC,CPTII,S$GLB, | Performed by: INTERNAL MEDICINE

## 2024-11-14 PROCEDURE — 1101F PT FALLS ASSESS-DOCD LE1/YR: CPT | Mod: HCNC,CPTII,S$GLB, | Performed by: INTERNAL MEDICINE

## 2024-11-14 RX ORDER — AMLODIPINE BESYLATE 5 MG/1
5 TABLET ORAL DAILY
Qty: 30 TABLET | Refills: 11 | Status: SHIPPED | OUTPATIENT
Start: 2024-11-14 | End: 2025-11-14

## 2024-11-14 NOTE — PROGRESS NOTES
Subjective:   Patient ID:  Emily Ordaz is a 69 y.o. female who presents for evaluation of No chief complaint on file.      HPI  November 14th 2024  She is coming in for six-month follow-up.    Denies any chest pain, syncope or presyncope   No unusual dyspnea on exertion.    She underwent a right carotid endarterectomy after last visit.  Most recent carotid ultrasound shows 60 79% bilateral following with vascular Dr. Tran.    Still smoking half a pack   Walks in her house states all day long     May 2024  68-year-old female smoker, hypertension hyperlipidemia.    Comes in for preop evaluation.    He is going for carotid artery endarterectomy.    She denies any chest pain, or significant dyspnea on exertion.    No lower extremity swelling.  No palpitations syncope or presyncope.    She has a acceptable functional status without angina.    I reviewed her CT scan of the chest done part of her lung screening and showed minimal to no coronary atherosclerosis.  Past Medical History:   Diagnosis Date    Arthritis     Hyperlipidemia     Hypertension     Osteoporosis        Past Surgical History:   Procedure Laterality Date    BILATERAL TUBAL LIGATION Bilateral 1994    CAROTID ENDARTERECTOMY Right 5/30/2024    Procedure: ENDARTERECTOMY, CAROTID;  Surgeon: Fanta Amos MD;  Location: [facility];  Service: Peripheral Vascular;  Laterality: Right;  nti, w/EEG    ORIF FINGER FRACTURE Left 1987       Social History     Tobacco Use    Smoking status: Former     Current packs/day: 0.50     Average packs/day: 0.5 packs/day for 45.0 years (22.5 ttl pk-yrs)     Types: Cigarettes    Smokeless tobacco: Never    Tobacco comments:     Hold midnight   Substance Use Topics    Alcohol use: Never    Drug use: No       Family History   Problem Relation Name Age of Onset    Breast cancer Paternal Aunt         Review of Systems   Cardiovascular:  Negative for chest pain, dyspnea on exertion, palpitations and syncope.    Genitourinary: Negative.    Neurological: Negative.        Current Outpatient Medications on File Prior to Visit   Medication Sig    alendronate (FOSAMAX) 70 MG tablet TAKE ONE TABLET BY MOUTH EVERY 7 DAYS    aspirin (ECOTRIN) 81 MG EC tablet Take 81 mg by mouth once daily.    azelastine (ASTELIN) 137 mcg (0.1 %) nasal spray 1 spray 2 (two) times daily.    cholecalciferol, vitamin D3, (VITAMIN D3 ORAL) Take by mouth.    citalopram (CELEXA) 20 MG tablet TAKE ONE TABLET BY MOUTH EVERY DAY    clopidogreL (PLAVIX) 75 mg tablet Take 1 tablet (75 mg total) by mouth once daily.    evolocumab (REPATHA SURECLICK) 140 mg/mL PnIj Inject 1 mL (140 mg total) into the skin every 14 (fourteen) days.    HYDROcodone-acetaminophen (NORCO) 5-325 mg per tablet Take 1 tablet by mouth every 6 (six) hours as needed for Pain.    losartan (COZAAR) 100 MG tablet TAKE ONE TABLET BY MOUTH EVERY DAY    meclizine (ANTIVERT) 25 mg tablet Take 1 tablet (25 mg total) by mouth 3 (three) times daily as needed for Dizziness.    meclizine (ANTIVERT) 25 mg tablet Take 1 tablet (25 mg total) by mouth 3 (three) times daily as needed.    metoprolol succinate (TOPROL-XL) 25 MG 24 hr tablet TAKE 1/2 TABLET BY MOUTH EVERY DAY    potassium chloride (MICRO-K) 10 MEQ CpSR TAKE TWO CAPSULES BY MOUTH EVERY DAY    triamterene-hydrochlorothiazide 37.5-25 mg (MAXZIDE-25) 37.5-25 mg per tablet TABLET ONE-HALF TABLET BY MOUTH EVERY DAY     No current facility-administered medications on file prior to visit.       Objective:   Objective:  Wt Readings from Last 3 Encounters:   11/14/24 58.3 kg (128 lb 10.2 oz)   07/24/24 59.1 kg (130 lb 2.9 oz)   06/13/24 59 kg (130 lb 1.1 oz)     Temp Readings from Last 3 Encounters:   09/17/24 97.6 °F (36.4 °C)   05/31/24 97.7 °F (36.5 °C) (Oral)   10/19/22 97.5 °F (36.4 °C) (Temporal)     BP Readings from Last 3 Encounters:   11/14/24 (!) 140/78   09/23/24 (!) 172/70   09/17/24 (!) 165/72     Pulse Readings from Last 3 Encounters:  "  11/14/24 91   09/17/24 74   07/24/24 83       Physical Exam  Vitals reviewed.   Constitutional:       Appearance: She is well-developed.   Neck:      Vascular: No carotid bruit.   Cardiovascular:      Rate and Rhythm: Normal rate and regular rhythm.      Pulses: Intact distal pulses.      Heart sounds: Normal heart sounds. No murmur heard.  Pulmonary:      Breath sounds: Normal breath sounds.   Neurological:      Mental Status: She is oriented to person, place, and time.         Lab Results   Component Value Date    CHOL 164 04/08/2024    CHOL 123 10/04/2023    CHOL 269 (H) 07/17/2023     Lab Results   Component Value Date    HDL 59 04/08/2024    HDL 51 10/04/2023    HDL 51 07/17/2023     Lab Results   Component Value Date    LDLCALC 62.6 (L) 04/08/2024    LDLCALC 28.4 (L) 10/04/2023    LDLCALC 153.2 07/17/2023     Lab Results   Component Value Date    TRIG 212 (H) 04/08/2024    TRIG 218 (H) 10/04/2023    TRIG 324 (H) 07/17/2023     Lab Results   Component Value Date    CHOLHDL 36.0 04/08/2024    CHOLHDL 41.5 10/04/2023    CHOLHDL 19.0 (L) 07/17/2023       Chemistry        Component Value Date/Time     05/23/2024 1523    K 4.4 05/23/2024 1523     05/23/2024 1523    CO2 29 05/23/2024 1523    BUN 17 05/23/2024 1523    CREATININE 1.0 05/23/2024 1523     (H) 05/23/2024 1523        Component Value Date/Time    CALCIUM 9.6 05/23/2024 1523    ALKPHOS 83 04/08/2024 1020    AST 27 04/08/2024 1020    ALT 16 04/08/2024 1020    BILITOT 0.4 04/08/2024 1020    ESTGFRAFRICA >60.0 01/31/2022 0918    EGFRNONAA >60.0 01/31/2022 0918          Lab Results   Component Value Date    TSH 3.6 05/30/2008     No results found for: "INR", "PROTIME"  Lab Results   Component Value Date    WBC 6.19 05/23/2024    HGB 12.8 05/23/2024    HCT 37.1 05/23/2024    MCV 88 05/23/2024     05/23/2024     BNP  @LABRCNTIP(BNP,BNPTRIAGEBLO)@  CrCl cannot be calculated (Patient's most recent lab result is older than the maximum 7 " days allowed.).     Imaging:  ======    No results found for this or any previous visit.    Results for orders placed in visit on 04/11/24    US Carotid Bilateral    Narrative  EXAMINATION:  US CAROTID BILATERAL    CLINICAL HISTORY:  Other specified symptoms and signs involving the circulatory and respiratory systems    TECHNIQUE:  Grayscale and color Doppler ultrasound examination of the carotid and vertebral artery systems bilaterally.  Stenosis estimates are per the NASCET measurement criteria.    COMPARISON:  None.    FINDINGS:  Right:    Internal Carotid Artery (ICA):    Peak systolic velocity 460 cm/sec    End diastolic velocity 91 cm/sec    ICA/CCA peak systolic ratio: 5.7    ICA/CCA end diastolic ratio: 4.7    Plaque formation: Large amount of homogeneous plaque noted at the carotid bulb and proximal ICA    Vertebral artery: Antegrade flow and normal waveform.    Left:    Internal Carotid Artery (ICA):    Peak systolic velocity 167 cm/sec    End diastolic velocity 44 cm/sec    ICA/CCA peak systolic ratio: 1.6    ICA/CCA end diastolic ratio: 1.6    Plaque formation: Moderate amount of homogeneous plaque present at the carotid bulb and proximal ICA.    Vertebral artery: Antegrade flow and normal waveform.    Impression  Atherosclerosis with elevated velocities suggesting greater than 70% ICA stenosis on the right and 50-69% stenosis on the lef..      Electronically signed by: Emiliano Ayoub MD  Date:    04/11/2024  Time:    10:15    No results found for this or any previous visit.    No results found for this or any previous visit.    No valid procedures specified.    No results found for this or any previous visit.      No results found for this or any previous visit.      No results found for this or any previous visit.      Diagnostic Results:  ECG: Reviewed    The 10-year ASCVD risk score (Belkis ESTRADA, et al., 2019) is: 9.8%    Values used to calculate the score:      Age: 69 years      Sex: Female      Is  Non- : No      Diabetic: No      Tobacco smoker: No      Systolic Blood Pressure: 140 mmHg      Is BP treated: Yes      HDL Cholesterol: 59 mg/dL      Total Cholesterol: 164 mg/dL    September 2024  The internal carotid artery is consistent with a 60-79% bilaterally.        Assessment and Plan:   Primary hypertension  Comments:  NOT AT GOAL , ADD NORASC  Orders:  -     amLODIPine (NORVASC) 5 MG tablet; Take 1 tablet (5 mg total) by mouth once daily.  Dispense: 30 tablet; Refill: 11    Stenosis of right carotid artery  Comments:  s/p carotid surgery with residual stenosis, following with vascular    Smoker  Comments:  Counseled    Statin intolerance  Comments:  ON REPATHA,    Aortic atherosclerosis  Comments:  ON REPATHA    S/P carotid endarterectomy  Comments:  ON DAPT AND REPATHA          Reviewed her CT scan of the chest with non to minimal coronary atherosclerosis she does have however aortic atherosclerosis and carotid atherosclerosis.  Reviewed all tests and above medical conditions with patient in detail and formulated treatment plan.  Risk factor modification discussed.   Cardiac low salt diet discussed.  Maintaining healthy weight and weight loss goals were discussed in clinic.  Smoking cessation  On aspirin and plavix  Follow up in  6 months

## 2024-11-19 DIAGNOSIS — M79.10 MYALGIA DUE TO HMG COA REDUCTASE INHIBITOR: ICD-10-CM

## 2024-11-19 DIAGNOSIS — E78.2 MIXED HYPERLIPIDEMIA: ICD-10-CM

## 2024-11-19 DIAGNOSIS — T46.6X5A MYALGIA DUE TO HMG COA REDUCTASE INHIBITOR: ICD-10-CM

## 2024-11-19 RX ORDER — EVOLOCUMAB 140 MG/ML
140 INJECTION, SOLUTION SUBCUTANEOUS
Qty: 2 ML | Refills: 2 | Status: ACTIVE | OUTPATIENT
Start: 2024-11-19 | End: 2025-02-17

## 2024-12-02 ENCOUNTER — HOSPITAL ENCOUNTER (OUTPATIENT)
Dept: RADIOLOGY | Facility: HOSPITAL | Age: 69
Discharge: HOME | End: 2024-12-02
Attending: FAMILY MEDICINE
Payer: MEDICARE

## 2024-12-02 DIAGNOSIS — Z87.891 PERSONAL HISTORY OF NICOTINE DEPENDENCE: ICD-10-CM

## 2024-12-02 PROCEDURE — 71271 CT THORAX LUNG CANCER SCR C-: CPT | Mod: TC,HCNC

## 2024-12-02 PROCEDURE — 71271 CT THORAX LUNG CANCER SCR C-: CPT | Mod: 26,HCNC,, | Performed by: RADIOLOGY

## 2024-12-04 ENCOUNTER — PATIENT OUTREACH (OUTPATIENT)
Dept: ADMINISTRATIVE | Facility: HOSPITAL | Age: 69
End: 2024-12-04
Payer: MEDICARE

## 2024-12-04 NOTE — PROGRESS NOTES
VBHM Score: 1     Uncontrolled BP    Influenza Vaccine  Shingles/Zoster Vaccine  RSV Vaccine          Health Maintenance Topic(s) Outreach Outcomes & Actions Taken:    Blood Pressure - Outreach Outcomes & Actions Taken  : patient has upcoming PCP appointment on 12/20/24 for follow up, BP will be checked at that time

## 2024-12-16 ENCOUNTER — LAB VISIT (OUTPATIENT)
Dept: LAB | Facility: HOSPITAL | Age: 69
End: 2024-12-16
Attending: FAMILY MEDICINE
Payer: MEDICARE

## 2024-12-16 DIAGNOSIS — I10 PRIMARY HYPERTENSION: ICD-10-CM

## 2024-12-16 DIAGNOSIS — E78.2 MIXED HYPERLIPIDEMIA: ICD-10-CM

## 2024-12-16 DIAGNOSIS — Z00.00 WELL ADULT EXAM: ICD-10-CM

## 2024-12-16 DIAGNOSIS — R79.9 ABNORMAL FINDING OF BLOOD CHEMISTRY, UNSPECIFIED: ICD-10-CM

## 2024-12-16 LAB
ALBUMIN SERPL BCP-MCNC: 4 G/DL (ref 3.5–5.2)
ALP SERPL-CCNC: 64 U/L (ref 40–150)
ALT SERPL W/O P-5'-P-CCNC: 27 U/L (ref 10–44)
ANION GAP SERPL CALC-SCNC: 6 MMOL/L (ref 8–16)
AST SERPL-CCNC: 30 U/L (ref 10–40)
BASOPHILS # BLD AUTO: 0.03 K/UL (ref 0–0.2)
BASOPHILS NFR BLD: 0.5 % (ref 0–1.9)
BILIRUB SERPL-MCNC: 0.5 MG/DL (ref 0.1–1)
BUN SERPL-MCNC: 13 MG/DL (ref 8–23)
CALCIUM SERPL-MCNC: 9.3 MG/DL (ref 8.7–10.5)
CHLORIDE SERPL-SCNC: 107 MMOL/L (ref 95–110)
CHOLEST SERPL-MCNC: 174 MG/DL (ref 120–199)
CHOLEST/HDLC SERPL: 2.9 {RATIO} (ref 2–5)
CO2 SERPL-SCNC: 26 MMOL/L (ref 23–29)
CREAT SERPL-MCNC: 1 MG/DL (ref 0.5–1.4)
DIFFERENTIAL METHOD BLD: ABNORMAL
EOSINOPHIL # BLD AUTO: 0.2 K/UL (ref 0–0.5)
EOSINOPHIL NFR BLD: 3.6 % (ref 0–8)
ERYTHROCYTE [DISTWIDTH] IN BLOOD BY AUTOMATED COUNT: 12.8 % (ref 11.5–14.5)
EST. GFR  (NO RACE VARIABLE): >60 ML/MIN/1.73 M^2
ESTIMATED AVG GLUCOSE: 103 MG/DL (ref 68–131)
GLUCOSE SERPL-MCNC: 99 MG/DL (ref 70–110)
HBA1C MFR BLD: 5.2 % (ref 4–5.6)
HCT VFR BLD AUTO: 36.5 % (ref 37–48.5)
HDLC SERPL-MCNC: 61 MG/DL (ref 40–75)
HDLC SERPL: 35.1 % (ref 20–50)
HGB BLD-MCNC: 12.5 G/DL (ref 12–16)
IMM GRANULOCYTES # BLD AUTO: 0.02 K/UL (ref 0–0.04)
IMM GRANULOCYTES NFR BLD AUTO: 0.3 % (ref 0–0.5)
LDLC SERPL CALC-MCNC: 88.6 MG/DL (ref 63–159)
LYMPHOCYTES # BLD AUTO: 2.4 K/UL (ref 1–4.8)
LYMPHOCYTES NFR BLD: 37.4 % (ref 18–48)
MCH RBC QN AUTO: 31.6 PG (ref 27–31)
MCHC RBC AUTO-ENTMCNC: 34.2 G/DL (ref 32–36)
MCV RBC AUTO: 92 FL (ref 82–98)
MONOCYTES # BLD AUTO: 0.5 K/UL (ref 0.3–1)
MONOCYTES NFR BLD: 7.3 % (ref 4–15)
NEUTROPHILS # BLD AUTO: 3.2 K/UL (ref 1.8–7.7)
NEUTROPHILS NFR BLD: 50.9 % (ref 38–73)
NONHDLC SERPL-MCNC: 113 MG/DL
NRBC BLD-RTO: 0 /100 WBC
PLATELET # BLD AUTO: 221 K/UL (ref 150–450)
PMV BLD AUTO: 10.1 FL (ref 9.2–12.9)
POTASSIUM SERPL-SCNC: 4.2 MMOL/L (ref 3.5–5.1)
PROT SERPL-MCNC: 7.4 G/DL (ref 6–8.4)
RBC # BLD AUTO: 3.96 M/UL (ref 4–5.4)
SODIUM SERPL-SCNC: 139 MMOL/L (ref 136–145)
T4 FREE SERPL-MCNC: 0.6 NG/DL (ref 0.71–1.51)
TRIGL SERPL-MCNC: 122 MG/DL (ref 30–150)
TSH SERPL DL<=0.005 MIU/L-ACNC: 7.9 UIU/ML (ref 0.4–4)
WBC # BLD AUTO: 6.34 K/UL (ref 3.9–12.7)

## 2024-12-16 PROCEDURE — 84443 ASSAY THYROID STIM HORMONE: CPT | Mod: HCNC | Performed by: FAMILY MEDICINE

## 2024-12-16 PROCEDURE — 85025 COMPLETE CBC W/AUTO DIFF WBC: CPT | Mod: HCNC | Performed by: FAMILY MEDICINE

## 2024-12-16 PROCEDURE — 83036 HEMOGLOBIN GLYCOSYLATED A1C: CPT | Mod: HCNC | Performed by: FAMILY MEDICINE

## 2024-12-16 PROCEDURE — 84439 ASSAY OF FREE THYROXINE: CPT | Mod: HCNC | Performed by: FAMILY MEDICINE

## 2024-12-16 PROCEDURE — 80053 COMPREHEN METABOLIC PANEL: CPT | Mod: HCNC | Performed by: FAMILY MEDICINE

## 2024-12-16 PROCEDURE — 36415 COLL VENOUS BLD VENIPUNCTURE: CPT | Mod: HCNC,PN | Performed by: FAMILY MEDICINE

## 2024-12-16 PROCEDURE — 80061 LIPID PANEL: CPT | Mod: HCNC | Performed by: FAMILY MEDICINE

## 2024-12-20 ENCOUNTER — OFFICE VISIT (OUTPATIENT)
Dept: FAMILY MEDICINE | Facility: CLINIC | Age: 69
End: 2024-12-20
Payer: MEDICARE

## 2024-12-20 VITALS
BODY MASS INDEX: 22.16 KG/M2 | SYSTOLIC BLOOD PRESSURE: 120 MMHG | DIASTOLIC BLOOD PRESSURE: 68 MMHG | OXYGEN SATURATION: 97 % | WEIGHT: 129.06 LBS | HEART RATE: 89 BPM

## 2024-12-20 DIAGNOSIS — F17.200 CURRENT SMOKER: ICD-10-CM

## 2024-12-20 DIAGNOSIS — M79.10 MYALGIA DUE TO HMG COA REDUCTASE INHIBITOR: ICD-10-CM

## 2024-12-20 DIAGNOSIS — Z00.00 WELL ADULT EXAM: Primary | ICD-10-CM

## 2024-12-20 DIAGNOSIS — E03.4 HYPOTHYROIDISM DUE TO ACQUIRED ATROPHY OF THYROID: ICD-10-CM

## 2024-12-20 DIAGNOSIS — Z12.11 COLON CANCER SCREENING: ICD-10-CM

## 2024-12-20 DIAGNOSIS — I10 PRIMARY HYPERTENSION: ICD-10-CM

## 2024-12-20 DIAGNOSIS — D64.9 MILD ANEMIA: ICD-10-CM

## 2024-12-20 DIAGNOSIS — E78.2 MIXED HYPERLIPIDEMIA: ICD-10-CM

## 2024-12-20 DIAGNOSIS — T46.6X5A MYALGIA DUE TO HMG COA REDUCTASE INHIBITOR: ICD-10-CM

## 2024-12-20 PROCEDURE — 99999 PR PBB SHADOW E&M-EST. PATIENT-LVL III: CPT | Mod: PBBFAC,HCNC,, | Performed by: FAMILY MEDICINE

## 2024-12-20 RX ORDER — LEVOTHYROXINE SODIUM 50 UG/1
50 TABLET ORAL
Qty: 30 TABLET | Refills: 11 | Status: SHIPPED | OUTPATIENT
Start: 2024-12-20 | End: 2025-12-20

## 2024-12-20 NOTE — PROGRESS NOTES
Chief Complaint:  No chief complaint on file.      History of Present Illness:    History of Present Illness    Patient presents today for follow-up after a recent episode of positional vertigo. She experienced an episode of positional vertigo in September, characterized by room-spinning sensation and vomiting. During this episode, her blood pressure was 200/160. The condition was diagnosed as positional vertigo at the hospital. She currently takes Mexiletine for management. She is currently on multiple antihypertensive medications including Amlodipine, Triamterene, Metoprolol, and Losartan. She also takes Repatha for cholesterol management. She has a history of hyperthyroidism and Graves' disease diagnosed in 1997, though she was asymptomatic for Graves' disease. Current thyroid levels are slightly abnormal, suggesting possible development of hypothyroidism. She currently smokes less than half a pack per day and is attempting to quit without medication. She is the primary caregiver for her grandchildren aged 20, 17, and 14, whom she has raised for the past 14 years. Mild anemia is present. Cholesterol, kidney function, liver function, and blood sugar are within normal limits.      ROS:  General: denies fever, denies chills, denies fatigue, denies weight gain, denies weight loss, denies loss of appetite, denies change in weight  Eyes: denies vision changes, denies blurry vision, denies eye pain, denies eye discharge  ENT: denies ear pain, denies hearing loss, denies tinnitus, denies nasal congestion, denies sore throat  Cardiovascular: denies chest pain, denies palpitations, denies lower extremity edema  Respiratory: denies cough, denies shortness of breath, denies wheezing, denies sputum production  Endocrine: denies polyuria, denies polydipsia, denies heat intolerance, denies cold intolerance  Gastrointestinal: denies abdominal pain, denies heartburn, denies nausea, admits vomiting, denies diarrhea, denies  constipation, denies blood in stool  Genitourinary: denies dysuria, denies urgency, denies frequency, denies hematuria, denies nocturia, denies incontinence  Heme & Lymphatic: denies easy or excessive bleeding, denies easy bruising, denies swollen lymph nodes  Musculoskeletal: denies muscle pain, denies back pain, denies joint pain, denies joint swelling  Skin: denies rash, denies lesion, denies itching, denies skin texture changes, denies skin color changes  Neurological: denies headache, denies dizziness, denies numbness, denies tingling, denies seizure activity, denies speech difficulty, denies memory loss, denies confusion  Psychiatric: denies anxiety, denies depression, denies sleep difficulty           Past Medical History:   Diagnosis Date    Arthritis     Hyperlipidemia     Hypertension     Osteoporosis        Social History:  Social History     Socioeconomic History    Marital status:    Tobacco Use    Smoking status: Former     Current packs/day: 0.50     Average packs/day: 0.5 packs/day for 45.0 years (22.5 ttl pk-yrs)     Types: Cigarettes    Smokeless tobacco: Never    Tobacco comments:     Hold midnight   Substance and Sexual Activity    Alcohol use: Never    Drug use: No    Sexual activity: Never     Partners: Male     Birth control/protection: None     Social Drivers of Health     Financial Resource Strain: Low Risk  (1/4/2024)    Overall Financial Resource Strain (CARDIA)     Difficulty of Paying Living Expenses: Not hard at all   Food Insecurity: No Food Insecurity (1/4/2024)    Hunger Vital Sign     Worried About Running Out of Food in the Last Year: Never true     Ran Out of Food in the Last Year: Never true   Transportation Needs: No Transportation Needs (1/4/2024)    PRAPARE - Transportation     Lack of Transportation (Medical): No     Lack of Transportation (Non-Medical): No   Physical Activity: Inactive (1/4/2024)    Exercise Vital Sign     Days of Exercise per Week: 0 days      Minutes of Exercise per Session: 0 min   Stress: No Stress Concern Present (1/4/2024)    [employer] [place] [employer] - Occupational Stress Questionnaire     Feeling of Stress : Only a little   Housing Stability: Low Risk  (1/4/2024)    Housing Stability Vital Sign     Unable to Pay for Housing in the Last Year: No     Number of Places Lived in the Last Year: 1     Unstable Housing in the Last Year: No       Family History:   family history includes Breast cancer in her paternal aunt.    Health Maintenance   Topic Date Due    Shingles Vaccine (1 of 2) Never done    RSV Vaccine (Age 60+ and Pregnant patients) (1 - Risk 60-74 years 1-dose series) Never done    Influenza Vaccine (1) 09/01/2024    COVID-19 Vaccine (3 - 2024-25 season) 09/01/2024    Colorectal Cancer Screening  12/09/2024    Mammogram  08/13/2025    DEXA Scan  09/22/2025    Lipid Panel  12/16/2025    TETANUS VACCINE  02/26/2028    Hepatitis C Screening  Completed    Pneumococcal Vaccines (Age 50+)  Completed       Exam:Physical     Vital Signs  Pulse: 89  SpO2: 97 %  BP: 120/68  Height and Weight  Weight: 58.6 kg (129 lb 1.3 oz)]    Body mass index is 22.16 kg/m².    Physical Exam    General: Well-developed. Well-nourished. No acute distress.  Eyes: EOMI. Sclerae anicteric.  HENT: Normocephalic. Atraumatic. Nares patent. Moist oral mucosa.  Cardiovascular: Regular rate. Regular rhythm. No murmurs. No rubs. No gallops. Normal S1, S2.  Respiratory: Normal respiratory effort. Clear to auscultation bilaterally. No rales. No rhonchi. No wheezing.  Musculoskeletal: No  obvious deformity.  Extremities: No lower extremity edema.  Neurological: Alert & oriented x3. No slurred speech. Normal gait.  Psychiatric: Normal mood. Normal affect. Good insight. Good judgment.  Skin: Warm. Dry. No rash.           Assessment:        ICD-10-CM ICD-9-CM   1. Well adult exam  Z00.00 V70.0   2. Current smoker  F17.200 305.1   3. Primary hypertension  I10 401.9   4.  Myalgia due to HMG CoA reductase inhibitor  M79.10 729.1    T46.6X5A E942.2   5. Mixed hyperlipidemia  E78.2 272.2   6. Hypothyroidism due to acquired atrophy of thyroid  E03.4 244.8     246.8   7. Colon cancer screening  Z12.11 V76.51   8. Mild anemia  D64.9 285.9         Plan:    Assessment & Plan    MEDICAL DECISION MAKING:  - Assessed thyroid function: TSH and free T4 slightly abnormal, indicating possible development of hypothyroidism.  - Evaluated mild anemia: Hemoglobin slightly decreased from 37 to 36.5. Not concerning at present, but will monitor.  - Reviewed vascular status: 60-79% stenosis on right side, moderate plaque in right proximal interocular artery. Dr. Amos reported slow progression.  - Considered smoking cessation options, but patient declined medication assistance.    PATIENT EDUCATION:  - Explained hypothyroidism and its potential symptoms (constipation, dry skin)  - Discussed importance of smoking cessation for vascular health  - Educated on the limitations of treating small vessel blockages and the importance of lifestyle modifications    ACTION ITEMS/LIFESTYLE:  - Patient to reduce smoking with goal of quitting  - Recommend increasing walking    MEDICATIONS:  - Started thyroid medication: Take on empty stomach with full glass of water. Wait at least 1 hour before eating or taking other medications.  - Continued Repatha for cholesterol  - Continued blood pressure medications: Maxzide, Potassium, Metoprolol, and Losartan  - Continued Mexiletine as needed for positional vertigo    ORDERS:  - Thyroid function test ordered in 3 months  - Complete blood count (CBC) ordered in 3 months to reassess anemia  - Colon cancer screening (Cologuard test) ordered    FOLLOW UP:  - Follow up in 3 months for thyroid and anemia reassessment         Diagnoses and all orders for this visit:    Well adult exam    Current smoker    Primary hypertension    Myalgia due to HMG CoA reductase inhibitor    Mixed  hyperlipidemia    Hypothyroidism due to acquired atrophy of thyroid  -     TSH; Future  -     T4, Free; Future    Colon cancer screening  -     Cologuard Screening (Multitarget Stool DNA); Future  -     Cologuard Screening (Multitarget Stool DNA)    Mild anemia  -     CBC Auto Differential; Future    Other orders  -     levothyroxine (SYNTHROID) 50 MCG tablet; Take 1 tablet (50 mcg total) by mouth before breakfast.        Follow up in about 6 months (around 6/20/2025).      Alonso Cortez MD

## 2024-12-24 ENCOUNTER — TELEPHONE (OUTPATIENT)
Dept: VASCULAR SURGERY | Facility: CLINIC | Age: 69
End: 2024-12-24
Payer: MEDICARE

## 2025-01-14 DIAGNOSIS — Z00.00 ENCOUNTER FOR MEDICARE ANNUAL WELLNESS EXAM: ICD-10-CM

## 2025-03-05 ENCOUNTER — PATIENT OUTREACH (OUTPATIENT)
Dept: ADMINISTRATIVE | Facility: HOSPITAL | Age: 70
End: 2025-03-05
Payer: MEDICARE

## 2025-03-20 ENCOUNTER — OFFICE VISIT (OUTPATIENT)
Dept: FAMILY MEDICINE | Facility: CLINIC | Age: 70
End: 2025-03-20
Payer: MEDICARE

## 2025-03-20 VITALS
HEART RATE: 74 BPM | HEIGHT: 64 IN | BODY MASS INDEX: 22.7 KG/M2 | SYSTOLIC BLOOD PRESSURE: 134 MMHG | WEIGHT: 132.94 LBS | OXYGEN SATURATION: 99 % | DIASTOLIC BLOOD PRESSURE: 66 MMHG

## 2025-03-20 DIAGNOSIS — E03.4 HYPOTHYROIDISM DUE TO ACQUIRED ATROPHY OF THYROID: ICD-10-CM

## 2025-03-20 DIAGNOSIS — I10 PRIMARY HYPERTENSION: Primary | ICD-10-CM

## 2025-03-20 DIAGNOSIS — F17.200 SMOKING: ICD-10-CM

## 2025-03-20 DIAGNOSIS — E63.9 POOR EATING HABITS: ICD-10-CM

## 2025-03-20 DIAGNOSIS — F32.5 MAJOR DEPRESSIVE DISORDER IN FULL REMISSION, UNSPECIFIED WHETHER RECURRENT: ICD-10-CM

## 2025-03-20 PROCEDURE — 99999 PR PBB SHADOW E&M-EST. PATIENT-LVL IV: CPT | Mod: PBBFAC,HCNC,, | Performed by: FAMILY MEDICINE

## 2025-03-20 NOTE — PROGRESS NOTES
Chief Complaint:    Chief Complaint   Patient presents with    Follow-up     3 mo f.u       History of Present Illness:    History of Present Illness    Patient presents today for follow up She has experienced multiple recent losses in her family, including her 42-year-old daughter who passed away in August due to sepsis from an infected needle related to drug use, her brother-in-law, and her niece's father. She is a former smoker who occasionally takes one puff in the morning while outside with her dogs before extinguishing the cigarette due to aversion to the smell. She has not initiated the prescribed thyroid medication due to recent personal circumstances but plans to start taking it in the morning. She continues Celexa for stress and anxiety, and Repatha for cholesterol management, which she reports as effective. Her daily breakfast consists of 100 nut Cheerios, and lunch typically includes a sandwich and chips. She reports having reduced her chocolate consumption and denies eating fried foods. She expresses interest in improving her eating habits, particularly for cholesterol management. CT scan of the chest was normal. Cologuard test was negative.      ROS:  General: denies fever, denies chills, denies fatigue, denies weight gain, denies weight loss, denies loss of appetite  Eyes: denies vision changes, denies blurry vision, denies eye pain, denies eye discharge  ENT: denies ear pain, denies hearing loss, denies tinnitus, denies nasal congestion, denies sore throat  Cardiovascular: denies chest pain, denies palpitations, denies lower extremity edema  Respiratory: denies cough, denies shortness of breath, denies wheezing, denies sputum production  Endocrine: denies polyuria, denies polydipsia, denies heat intolerance, denies cold intolerance  Gastrointestinal: denies abdominal pain, denies heartburn, denies nausea, denies vomiting, denies diarrhea, denies constipation, denies blood in stool  Genitourinary:  denies dysuria, denies urgency, denies frequency, denies hematuria, denies nocturia, denies incontinence  Heme & Lymphatic: denies easy or excessive bleeding, denies easy bruising, denies swollen lymph nodes  Musculoskeletal: denies muscle pain, denies back pain, denies joint pain, denies joint swelling  Skin: denies rash, denies lesion, denies itching, denies skin texture changes, denies skin color changes  Neurological: denies headache, denies dizziness, denies numbness, denies tingling, denies seizure activity, denies speech difficulty, denies memory loss, denies confusion  Psychiatric: admits anxiety, denies depression, denies sleep difficulty           Past Medical History:   Diagnosis Date    Arthritis     Hyperlipidemia     Hypertension     Osteoporosis        Social History:  Social History     Socioeconomic History    Marital status:    Tobacco Use    Smoking status: Former     Current packs/day: 0.50     Average packs/day: 0.5 packs/day for 45.0 years (22.5 ttl pk-yrs)     Types: Cigarettes    Smokeless tobacco: Never    Tobacco comments:     Hold midnight   Substance and Sexual Activity    Alcohol use: Never    Drug use: No    Sexual activity: Never     Partners: Male     Birth control/protection: None     Social Drivers of Health     Financial Resource Strain: Low Risk  (1/4/2024)    Overall Financial Resource Strain (CARDIA)     Difficulty of Paying Living Expenses: Not hard at all   Food Insecurity: No Food Insecurity (1/4/2024)    Hunger Vital Sign     Worried About Running Out of Food in the Last Year: Never true     Ran Out of Food in the Last Year: Never true   Transportation Needs: No Transportation Needs (1/4/2024)    PRAPARE - Transportation     Lack of Transportation (Medical): No     Lack of Transportation (Non-Medical): No   Physical Activity: Inactive (1/4/2024)    Exercise Vital Sign     Days of Exercise per Week: 0 days     Minutes of Exercise per Session: 0 min   Stress: No Stress  "Concern Present (1/4/2024)    [employer] - Occupational Stress Questionnaire     Feeling of Stress : Only a little   Housing Stability: Low Risk  (1/4/2024)    Housing Stability Vital Sign     Unable to Pay for Housing in the Last Year: No     Number of Places Lived in the Last Year: 1     Unstable Housing in the Last Year: No       Family History:   family history includes Breast cancer in her paternal aunt.    Health Maintenance   Topic Date Due    Shingles Vaccine (1 of 2) Never done    RSV Vaccine (Age 60+ and Pregnant patients) (1 - Risk 60-74 years 1-dose series) Never done    Influenza Vaccine (1) 09/01/2024    COVID-19 Vaccine (3 - 2024-25 season) 09/01/2024    Mammogram  08/13/2025    DEXA Scan  09/22/2025    Lipid Panel  12/16/2025    Colorectal Cancer Screening  01/06/2028    TETANUS VACCINE  02/26/2028    Hepatitis C Screening  Completed    Pneumococcal Vaccines (Age 50+)  Completed       Exam:Physical     Vital Signs  Pulse: 74  SpO2: 99 %  BP: 134/66  BP Location: Left arm  Patient Position: Sitting  Pain Score: 0-No pain  Height and Weight  Height: 5' 4" (162.6 cm)  Weight: 60.3 kg (132 lb 15 oz)  BSA (Calculated - sq m): 1.65 sq meters  BMI (Calculated): 22.8  Weight in (lb) to have BMI = 25: 145.3]    Body mass index is 22.82 kg/m².    Physical Exam    General: Well-developed. Well-nourished. No acute distress.  Eyes: EOMI. Sclerae anicteric.  HENT: Normocephalic. Atraumatic. Nares patent. Moist oral mucosa.  Cardiovascular: Regular rate. Regular rhythm. No murmurs. No rubs. No gallops. Normal S1, S2.  Respiratory: Normal respiratory effort. Clear to auscultation bilaterally. No rales. No rhonchi. No wheezing.  Musculoskeletal: No  obvious deformity.  Extremities: No lower extremity edema.  Neurological: Alert & oriented x3. No slurred speech. Normal gait.  Psychiatric: Normal mood. Normal affect. Good insight. Good judgment.  Skin: Warm. Dry. No rash.     "       Assessment:        ICD-10-CM ICD-9-CM   1. Primary hypertension  I10 401.9   2. Major depressive disorder in full remission, unspecified whether recurrent  F32.5 296.26   3. Smoking  F17.200 305.1   4. Hypothyroidism due to acquired atrophy of thyroid  E03.4 244.8     246.8   5. Poor eating habits  E63.9 269.9         Plan:    Assessment & Plan    MEDICAL DECISION MAKING:  - Reviewed thyroid function tests, noting elevated TSH and decreased free T4, indicating hypothyroidism.  - Assessed smoking status and considered options for smoking cessation support.  - Evaluated diet and determined need for nutritional guidance to improve eating habits.  - Considered recent family losses and ongoing stress/anxiety management.    PATIENT EDUCATION:  - Explained thyroid function test results, including the relationship between TSH and free T4 levels.  - Discussed importance of proper thyroid medication administration for optimal absorption.  - Provided information on shingles vaccination and its benefits in reducing severity if infection occurs.    ACTION ITEMS/LIFESTYLE:  - Patient to continue efforts to quit smoking.  - Patient to reduce consumption of processed carbohydrates, including bread, pasta, cookies, and crackers.  - Patient to increase intake of vegetables and lean proteins.  - Patient to limit fruit consumption, especially high-sugar fruits like bananas, grapes, and watermelon.    MEDICATIONS:  - Started thyroid medication to be taken in the morning on an empty stomach with water only. Wait 30-40 minutes after taking thyroid medication before eating.  - Continued Celexa for stress and anxiety management.  - Continued Repatha for cholesterol management.    ORDERS:  - Ordered thyroid function labs to be conducted in 3 months.    REFERRALS:  - Referred to nutritionist for dietary consultation.    FOLLOW UP:  - Follow up in 3 months for reassessment of thyroid function after starting medication.         Emily  was seen today for follow-up.    Diagnoses and all orders for this visit:    Primary hypertension  -     CBC Auto Differential; Future  -     Lipid Panel; Future  -     Hemoglobin A1C; Future  -     Comprehensive Metabolic Panel; Future  -     TSH; Future  -     Ambulatory referral/consult to Nutrition Services; Future    Major depressive disorder in full remission, unspecified whether recurrent  -     CBC Auto Differential; Future  -     Lipid Panel; Future  -     Hemoglobin A1C; Future  -     Comprehensive Metabolic Panel; Future  -     TSH; Future    Smoking  -     CBC Auto Differential; Future  -     Lipid Panel; Future  -     Hemoglobin A1C; Future  -     Comprehensive Metabolic Panel; Future  -     TSH; Future  -     Ambulatory referral/consult to Nutrition Services; Future    Hypothyroidism due to acquired atrophy of thyroid  -     CBC Auto Differential; Future  -     Lipid Panel; Future  -     Hemoglobin A1C; Future  -     Comprehensive Metabolic Panel; Future  -     TSH; Future    Poor eating habits  -     Ambulatory referral/consult to Nutrition Services; Future        Follow up in about 3 months (around 6/20/2025).      Alonso Cortez MD

## 2025-03-21 ENCOUNTER — PATIENT MESSAGE (OUTPATIENT)
Dept: INTERNAL MEDICINE | Facility: CLINIC | Age: 70
End: 2025-03-21
Payer: MEDICARE

## 2025-05-05 ENCOUNTER — HOSPITAL ENCOUNTER (OUTPATIENT)
Facility: HOSPITAL | Age: 70
Discharge: HOME | End: 2025-05-05
Attending: STUDENT IN AN ORGANIZED HEALTH CARE EDUCATION/TRAINING PROGRAM
Payer: MEDICARE

## 2025-05-05 ENCOUNTER — OFFICE VISIT (OUTPATIENT)
Dept: VASCULAR SURGERY | Facility: CLINIC | Age: 70
End: 2025-05-05
Payer: MEDICARE

## 2025-05-05 VITALS — DIASTOLIC BLOOD PRESSURE: 66 MMHG | SYSTOLIC BLOOD PRESSURE: 122 MMHG

## 2025-05-05 DIAGNOSIS — Z78.9 STATIN INTOLERANCE: ICD-10-CM

## 2025-05-05 DIAGNOSIS — I65.23 CAROTID STENOSIS, ASYMPTOMATIC, BILATERAL: Primary | ICD-10-CM

## 2025-05-05 DIAGNOSIS — I65.29 STENOSIS OF CAROTID ARTERY, UNSPECIFIED LATERALITY: ICD-10-CM

## 2025-05-05 DIAGNOSIS — I10 PRIMARY HYPERTENSION: ICD-10-CM

## 2025-05-05 DIAGNOSIS — I65.23 CAROTID ARTERY STENOSIS, ASYMPTOMATIC, BILATERAL: ICD-10-CM

## 2025-05-05 DIAGNOSIS — E78.2 MIXED HYPERLIPIDEMIA: ICD-10-CM

## 2025-05-05 LAB
LEFT ARM DIASTOLIC BLOOD PRESSURE: 70 MMHG
LEFT ARM SYSTOLIC BLOOD PRESSURE: 118 MMHG
LEFT CCA DIST DIAS: 25.89 CM/S
LEFT CCA DIST SYS: 97.58 CM/S
LEFT CCA MID DIAS: 23.9 CM/S
LEFT CCA MID SYS: 101.57 CM/S
LEFT CCA PROX DIAS: 21.91 CM/S
LEFT CCA PROX SYS: 93.6 CM/S
LEFT ECA DIAS: 14.67 CM/S
LEFT ECA SYS: 232.1 CM/S
LEFT ICA DIST DIAS: 29 CM/S
LEFT ICA DIST SYS: 95 CM/S
LEFT ICA MID DIAS: 29.16 CM/S
LEFT ICA MID SYS: 94.56 CM/S
LEFT ICA PROX DIAS: 53.36 CM/S
LEFT ICA PROX SYS: 170.74 CM/S
LEFT ICA/CCA SYS: 1.75
LEFT VERTEBRAL DIAS: 13.92 CM/S
LEFT VERTEBRAL SYS: 57.15 CM/S
OHS CV CAROTID RIGHT ICA EDV HIGHEST: 54.16
OHS CV CAROTID ULTRASOUND LEFT ICA/CCA RATIO: 1.75
OHS CV CAROTID ULTRASOUND RIGHT ICA/CCA RATIO: 2.33
OHS CV PV CAROTID LEFT HIGHEST CCA: 102
OHS CV PV CAROTID LEFT HIGHEST ICA: 170.74
OHS CV PV CAROTID RIGHT HIGHEST CCA: 102
OHS CV PV CAROTID RIGHT HIGHEST ICA: 234.13
OHS CV US CAROTID LEFT HIGHEST EDV: 53.36
RIGHT ARM DIASTOLIC BLOOD PRESSURE: 66 MMHG
RIGHT ARM SYSTOLIC BLOOD PRESSURE: 126 MMHG
RIGHT CCA DIST DIAS: 21.42 CM/S
RIGHT CCA DIST SYS: 100.66 CM/S
RIGHT CCA MID DIAS: 23.56 CM/S
RIGHT CCA MID SYS: 101.73 CM/S
RIGHT CCA PROX DIAS: 21.34 CM/S
RIGHT CCA PROX SYS: 101.38 CM/S
RIGHT ECA DIAS: 13.92 CM/S
RIGHT ECA SYS: 171.34 CM/S
RIGHT ICA DIST DIAS: 33.2 CM/S
RIGHT ICA DIST SYS: 120.56 CM/S
RIGHT ICA MID DIAS: 45.43 CM/S
RIGHT ICA MID SYS: 118.81 CM/S
RIGHT ICA PROX DIAS: 54.16 CM/S
RIGHT ICA PROX SYS: 234.13 CM/S
RIGHT ICA/CCA SYS: 2.33
RIGHT PROX SUBCLAVIAN ARTERY: 195.87 CM/S
RIGHT VERTEBRAL DIAS: 24.89 CM/S
RIGHT VERTEBRAL SYS: 95.59 CM/S

## 2025-05-05 PROCEDURE — 93880 EXTRACRANIAL BILAT STUDY: CPT | Mod: HCNC

## 2025-05-05 PROCEDURE — 99999 PR PBB SHADOW E&M-EST. PATIENT-LVL II: CPT | Mod: PBBFAC,HCNC,, | Performed by: PHYSICIAN ASSISTANT

## 2025-05-05 NOTE — PROGRESS NOTES
Tyler Calvo [facility], [place]  [facility]    OFFICE NOTE    DATE OF VISIT: 2025  PATIENT NAME: Emily Ordaz  : 1955  MRN: [id number]  PRIMARY CARE PHYSICIAN: Alonso Cortez MD  REFERRING PROVIDER: No ref. provider found    CHIEF COMPLAINT   Chief Complaint   Patient presents with    Carotid Artery Disease       HISTORY OF PRESENT ILLNESS:  Emily Ordaz is a 69 y.o. female who presents to clinic today in follow-up visit for her Carotid stenosis.  Patient has history of right CEA on 2024 with Dr. Amos.  She is overall doing well.Patient denies any pains to extremities, numbness or tingling, headaches, blurred visions or Amaurosis Fugax. She has no other issues or complaints at today's visit.     She is compliant with her ASA 81 and Plavix      ALLERGIES:  Review of patient's allergies indicates:   Allergen Reactions    Statins-hmg-coa reductase inhibitors      Leg cramps and pain        PAST MEDICAL HISTORY:  Past Medical History:   Diagnosis Date    Arthritis     Hyperlipidemia     Hypertension     Osteoporosis        PAST SURGICAL HISTORY:  Past Surgical History:   Procedure Laterality Date    BILATERAL TUBAL LIGATION Bilateral     CAROTID ENDARTERECTOMY Right 2024    Procedure: ENDARTERECTOMY, CAROTID;  Surgeon: Fanta Amos MD;  Location: [facility];  Service: Peripheral Vascular;  Laterality: Right;  nti, w/EEG    ORIF FINGER FRACTURE Left        SOCIAL HISTORY:   Social History[1]    FAMILY HISTORY:  Family History   Problem Relation Name Age of Onset    Breast cancer Paternal Aunt         REVIEW OF SYSTEMS:  Review of Systems   All other systems reviewed and are negative.      PHYSICAL EXAM:  Vitals:    25 1042   BP: 122/66      Physical Exam  Vitals and nursing note reviewed.   Constitutional:       Appearance: Normal appearance.   HENT:      Head: Normocephalic.      Nose: Nose normal.   Eyes:      Pupils: Pupils are equal, round, and reactive to light.    Cardiovascular:      Rate and Rhythm: Normal rate.   Pulmonary:      Effort: Pulmonary effort is normal.   Abdominal:      General: Abdomen is flat. There is no distension.   Musculoskeletal:      Cervical back: Normal range of motion.   Skin:     General: Skin is warm and dry.   Neurological:      General: No focal deficit present.      Mental Status: She is alert and oriented to person, place, and time. Mental status is at baseline.   Psychiatric:         Mood and Affect: Mood normal.         Behavior: Behavior normal.         Judgment: Judgment normal.             VASCULAR LAB STUDIES:  Dr. Amos and I have personally reviewed the studies.  Carotid duplex performed in clinic today:   Right at 60-79% w/ PSV of 234 cm/s, previously 281cm/s   Left at 60-79% w/ PSV of 170 cm/s   Decreased velocities on the right compared to previous exam  No other changes from previous exam on 09/23/2024   Vertebral arteries are antegrade, bilaterally      ASSESSMENT AND PLAN:  Hyperlipidemia  Follows with her PCP.  Not currently on statin therapy due to intolerance.    HTN (hypertension)  Compliant with current medication regimen.     Carotid stenosis, asymptomatic, bilateral  Patient has history of right CEA on 05/30/2024 with Dr. Amos.  She is overall doing well and without issue.  She denies any neurodeficits at today's visit.  Repeat carotid duplex today in clinic remained stable from previous exam; right remains at 60-79% but with decreasing velocities and left at 60-79% stenosis.  We will continue with six-month follow-up visits and repeat carotid duplex.  Patient is to continue her daily ASA 81 and Plavix as well.      Emily was seen today for carotid artery disease.    Diagnoses and all orders for this visit:    Carotid stenosis, asymptomatic, bilateral  -     CV Ultrasound Bilateral Doppler Carotid; Future    Primary hypertension    Mixed hyperlipidemia    Statin intolerance        Follow up in about 6 months  (around 11/5/2025) for carotid stenosis.        Tyler Calvo  [facility]  Vascular Surgery  [phone] (Clinic Number)         [1]   Social History  Tobacco Use    Smoking status: Former     Current packs/day: 0.50     Average packs/day: 0.5 packs/day for 45.0 years (22.5 ttl pk-yrs)     Types: Cigarettes    Smokeless tobacco: Never    Tobacco comments:     Hold midnight   Substance Use Topics    Alcohol use: Never    Drug use: No

## 2025-05-05 NOTE — ASSESSMENT & PLAN NOTE
Patient has history of right CEA on 05/30/2024 with Dr. Amos.  She is overall doing well and without issue.  She denies any neurodeficits at today's visit.  Repeat carotid duplex today in clinic remained stable from previous exam; right remains at 60-79% but with decreasing velocities and left at 60-79% stenosis.  We will continue with six-month follow-up visits and repeat carotid duplex.  Patient is to continue her daily ASA 81 and Plavix as well.

## 2025-05-28 NOTE — PROGRESS NOTES
Chief Complaint:    Chief Complaint   Patient presents with    Knee Pain       History of Present Illness:  Patient with HTN and HLD presents today for a six month follow-up    Reports R intermittent knee pain; stabbing, dull sensation. Went to urgent care initially, was told it was arthritis and given prednisone. No relief. [place] and was taking tylenol arthritis. Started causing pain in her hip because she was trying to offload. Getting harder to bear weight   Reports a rash on her neck, itchy. Similar to a rash she had on her back.  Monitoring her BP at home once a week. Runs ~137/80   Didn't start repatha because her insurance didn't approve it.  Past due for [facility] smear[facility]  Due for mammogram.       ROS:  Review of Systems   Constitutional: Negative for activity change, chills, fatigue, fever and unexpected weight change.   HENT: Negative for congestion, ear discharge, ear pain, hearing loss, postnasal drip and rhinorrhea.    Eyes: Negative for pain and visual disturbance.   Respiratory: Negative for cough, chest tightness and shortness of breath.    Cardiovascular: Negative for chest pain and palpitations.   Gastrointestinal: Negative for abdominal pain, diarrhea and vomiting.   Endocrine: Negative for heat intolerance.   Genitourinary: Negative for dysuria, flank pain, frequency and hematuria.   Musculoskeletal: Positive for arthralgias and myalgias. Negative for back pain, gait problem and neck pain.   Skin: Positive for rash. Negative for color change.   Neurological: Negative for dizziness, tremors, seizures, numbness and headaches.   Psychiatric/Behavioral: Negative for agitation, hallucinations, self-injury, sleep disturbance and suicidal ideas. The patient is not nervous/anxious.    All other systems reviewed and are negative.      Past Medical History:   Diagnosis Date    Arthritis     Hyperlipidemia     Hypertension     Osteoporosis   "      Social History:  Social History     Socioeconomic History    Marital status:    Tobacco Use    Smoking status: Current Some Day Smoker     Packs/day: 0.50     Years: 45.00     Pack years: 22.50     Types: Cigarettes    Smokeless tobacco: Never Used   Substance and Sexual Activity    Alcohol use: No     Alcohol/week: 0.0 standard drinks    Drug use: No    Sexual activity: Never     Partners: Male     Birth control/protection: None       Family History:   family history includes Breast cancer in her paternal aunt.    Health Maintenance   Topic Date Due    Lipid Panel  08/29/2020    Mammogram  09/01/2021    LDCT Lung Screen  12/06/2022    TETANUS VACCINE  02/26/2028    Hepatitis C Screening  Completed       Physical Exam:    Vital Signs  Temp: 97.9 °F (36.6 °C)  Temp src: Temporal  Pulse: 76  SpO2: 98 %  BP: (!) 154/86  BP Location: Left arm  Patient Position: Sitting  Pain Score:   1  Pain Loc: Knee (Left Knee)  Height and Weight  Height: 5' 4" (162.6 cm)  Weight: 60.7 kg (133 lb 13.1 oz)  BSA (Calculated - sq m): 1.66 sq meters  BMI (Calculated): 23  Weight in (lb) to have BMI = 25: 145.3]    Body mass index is 22.97 kg/m².    Physical Exam  Vitals and nursing note reviewed.   Constitutional:       Appearance: Normal appearance. She is not toxic-appearing.   HENT:      Head: Normocephalic and atraumatic.      Right Ear: Tympanic membrane normal.      Left Ear: Tympanic membrane normal.   Eyes:      Extraocular Movements: Extraocular movements intact.      Pupils: Pupils are equal, round, and reactive to light.   Cardiovascular:      Rate and Rhythm: Normal rate and regular rhythm.      Heart sounds: Normal heart sounds.   Pulmonary:      Effort: Pulmonary effort is normal.      Breath sounds: Normal breath sounds. No wheezing, rhonchi or rales.   Abdominal:      General: Bowel sounds are normal. There is no distension.      Palpations: Abdomen is soft.      Tenderness: There is no abdominal " tenderness.   Musculoskeletal:         General: Normal range of motion.      Cervical back: Normal range of motion.      Right knee: Tenderness present.      Left knee: Tenderness present.        Legs:    Skin:     General: Skin is warm and dry.      Capillary Refill: Capillary refill takes less than 2 seconds.      Findings: Rash (back of neck) present.          Neurological:      General: No focal deficit present.      Mental Status: She is alert and oriented to person, place, and time.   Psychiatric:         Mood and Affect: Mood normal.         Behavior: Behavior normal.         Judgment: Judgment normal.           Assessment:      ICD-10-CM ICD-9-CM   1. Hypertension not at goal  I10 401.9   2. Sprain of lateral collateral ligament of left knee, initial encounter  S83.422A 844.0   3. Localized secondary osteoarthrosis of knee  M17.5 715.26   4. Statin intolerance  Z78.9 995.27   5. 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 Breast cancer screening by mammogram  Z12.31 V76.12   7. Contact dermatitis, unspecified contact dermatitis type, unspecified trigger  L25.9 692.9     Plan:     Recommend rest, knee immobilizing brace for a few weeks. Go to [facility]. Order x-ray AP Standing Knees with Both Lateral for sprain of lateral collateral ligament of left knee. Refer to physical therapy for localized secondary osteoarthritis of knee.  Continue monitoring blood pressure. Keep < 140/90. Increase losartan to 100 mg. Check blood pressure in 1-2 hours and send reading through [facility]  Recommend Kenalog 0.1% for contact dermatitis.  [facility]   Seem like her insurance refused a Pap injections she will be transitioning over to Medicare next year and will try again then.  Schedule mammogram.  Check labs as below  Follow-up 6 months  Orders Placed This Encounter   Procedures    X-ray AP Standing Knees with Both Lateral    Mammo Digital Screening  Ryan    Ambulatory referral/consult to Physical/Occupational Therapy       Current Outpatient Medications   Medication Sig Dispense Refill    citalopram (CELEXA) 20 MG tablet TAKE 1 TABLET BY MOUTH EVERY DAY 30 tablet 11    diclofenac sodium (VOLTAREN) 1 % Gel Apply 2 g topically once daily. 1 Tube 2    metoprolol succinate (TOPROL-XL) 25 MG 24 hr tablet TAKE 1/2 TABLET BY MOUTH EVERY  tablet 0    potassium 99 mg Tab potassium Take No date recorded No form recorded No frequency recorded No route recorded No set duration recorded No set duration amount recorded active No dosage strength recorded No dosage strength units of measure recorded      potassium chloride (MICRO-K) 10 MEQ CpSR TAKE TWO CAPSULES BY MOUTH EVERY  capsule 0    triamcinolone acetonide 0.1% (KENALOG) 0.1 % cream Apply topically 2 (two) times daily. for 10 days 1 each 1    triamterene-hydrochlorothiazide 37.5-25 mg (MAXZIDE-25) 37.5-25 mg per tablet TAKE 1/2 TABLET BY MOUTH EVERY DAY 45 tablet 1    TURMERIC ORAL Take by mouth.      cholecalciferol, vitamin D3, (VITAMIN D3 ORAL) Take by mouth.      evolocumab (REPATHA SURECLICK) 140 mg/mL PnIj Inject 1 mL (140 mg total) into the skin every 14 (fourteen) days. (Patient not taking: Reported on 5/25/2022) 2 mL 6    losartan (COZAAR) 100 MG tablet Take 0.5 tablets (50 mg total) by mouth once daily. 90 tablet 6     No current facility-administered medications for this visit.       Medications Discontinued During This Encounter   Medication Reason    methylPREDNISolone (MEDROL DOSEPACK) 4 mg tablet Patient no longer taking    meloxicam (MOBIC) 7.5 MG tablet Patient no longer taking    losartan (COZAAR) 25 MG tablet        No follow-ups on file.      Alonso Cortez MD  Scribe Attestation:   I, Domenica Joseph, am scribing for, and in the presence of, Dr.Arif Cortez I performed the above scribed service and the documentation accurately describes the services I performed. I attest  to the accuracy of the note.    I, Dr. Alonso Cortez, reviewed documentation as scribed above. I performed the services described in this documentation.  I agree that the record reflects my personal performance and is accurate and complete. Alonso Cortez MD.  05/25/2022     Path Notes (To The Dermatopathologist): Size: .5 cm

## 2025-06-10 ENCOUNTER — NURSE TRIAGE (OUTPATIENT)
Dept: ADMINISTRATIVE | Facility: CLINIC | Age: 70
End: 2025-06-10
Payer: MEDICARE

## 2025-06-10 NOTE — TELEPHONE ENCOUNTER
Pt states takes Synthroid daily in AM-- Accidentally took second dose tonight. Denies symptoms. Per protocol, call pharmacist now. Call transferred to 24 hour [facility] pharmac[facility].   Reason for Disposition   [1] DOUBLE DOSE (an extra dose or lesser amount) of prescription drug AND [2] NO symptoms  (Exception: A double dose of antibiotics.)    Additional Information   Negative: [1] Intentional drug overdose AND [2] suicidal thoughts or ideas   Negative: MORE THAN A DOUBLE DOSE of a prescription or over-the-counter (OTC) drug   Negative: [1] DOUBLE DOSE (an extra dose or lesser amount) of prescription drug AND [2] any symptoms (e.g., dizziness, nausea, pain, sleepiness)   Negative: [1] DOUBLE DOSE (an extra dose or lesser amount) of over-the-counter (OTC) drug AND [2] any symptoms (e.g., dizziness, nausea, pain, sleepiness)   Negative: Took another person's prescription drug    Protocols used: Medication Question Call-A-

## 2025-06-20 ENCOUNTER — LAB VISIT (OUTPATIENT)
Dept: LAB | Facility: HOSPITAL | Age: 70
End: 2025-06-20
Attending: FAMILY MEDICINE
Payer: MEDICARE

## 2025-06-20 DIAGNOSIS — F32.5 MAJOR DEPRESSIVE DISORDER IN FULL REMISSION, UNSPECIFIED WHETHER RECURRENT: ICD-10-CM

## 2025-06-20 DIAGNOSIS — F17.200 NICOTINE DEPENDENCE, UNSPECIFIED, UNCOMPLICATED: ICD-10-CM

## 2025-06-20 DIAGNOSIS — I10 PRIMARY HYPERTENSION: ICD-10-CM

## 2025-06-20 DIAGNOSIS — E03.4 HYPOTHYROIDISM DUE TO ACQUIRED ATROPHY OF THYROID: ICD-10-CM

## 2025-06-20 LAB
ABSOLUTE EOSINOPHIL (OHS): 0.36 K/UL
ABSOLUTE MONOCYTE (OHS): 0.53 K/UL (ref 0.3–1)
ABSOLUTE NEUTROPHIL COUNT (OHS): 3.3 K/UL (ref 1.8–7.7)
ALBUMIN SERPL BCP-MCNC: 4.3 G/DL (ref 3.5–5.2)
ALP SERPL-CCNC: 70 UNIT/L (ref 40–150)
ALT SERPL W/O P-5'-P-CCNC: 13 UNIT/L (ref 10–44)
ANION GAP (OHS): 13 MMOL/L (ref 8–16)
AST SERPL-CCNC: 23 UNIT/L (ref 11–45)
BASOPHILS # BLD AUTO: 0.05 K/UL
BASOPHILS NFR BLD AUTO: 0.7 %
BILIRUB SERPL-MCNC: 0.4 MG/DL (ref 0.1–1)
BUN SERPL-MCNC: 15 MG/DL (ref 8–23)
CALCIUM SERPL-MCNC: 9.5 MG/DL (ref 8.7–10.5)
CHLORIDE SERPL-SCNC: 104 MMOL/L (ref 95–110)
CHOLEST SERPL-MCNC: 262 MG/DL (ref 120–199)
CHOLEST/HDLC SERPL: 5.6 {RATIO} (ref 2–5)
CO2 SERPL-SCNC: 25 MMOL/L (ref 23–29)
CREAT SERPL-MCNC: 0.8 MG/DL (ref 0.5–1.4)
EAG (OHS): 103 MG/DL (ref 68–131)
ERYTHROCYTE [DISTWIDTH] IN BLOOD BY AUTOMATED COUNT: 12.5 % (ref 11.5–14.5)
GFR SERPLBLD CREATININE-BSD FMLA CKD-EPI: >60 ML/MIN/1.73/M2
GLUCOSE SERPL-MCNC: 95 MG/DL (ref 70–110)
HBA1C MFR BLD: 5.2 % (ref 4–5.6)
HCT VFR BLD AUTO: 39.8 % (ref 37–48.5)
HDLC SERPL-MCNC: 47 MG/DL (ref 40–75)
HDLC SERPL: 17.9 % (ref 20–50)
HGB BLD-MCNC: 13.2 GM/DL (ref 12–16)
IMM GRANULOCYTES # BLD AUTO: 0.01 K/UL (ref 0–0.04)
IMM GRANULOCYTES NFR BLD AUTO: 0.1 % (ref 0–0.5)
LDLC SERPL CALC-MCNC: 160.6 MG/DL (ref 63–159)
LYMPHOCYTES # BLD AUTO: 2.94 K/UL (ref 1–4.8)
MCH RBC QN AUTO: 30.2 PG (ref 27–31)
MCHC RBC AUTO-ENTMCNC: 33.2 G/DL (ref 32–36)
MCV RBC AUTO: 91 FL (ref 82–98)
NONHDLC SERPL-MCNC: 215 MG/DL
NUCLEATED RBC (/100WBC) (OHS): 0 /100 WBC
PLATELET # BLD AUTO: 224 K/UL (ref 150–450)
PMV BLD AUTO: 10.2 FL (ref 9.2–12.9)
POTASSIUM SERPL-SCNC: 4 MMOL/L (ref 3.5–5.1)
PROT SERPL-MCNC: 7.7 GM/DL (ref 6–8.4)
RBC # BLD AUTO: 4.37 M/UL (ref 4–5.4)
RELATIVE EOSINOPHIL (OHS): 5 %
RELATIVE LYMPHOCYTE (OHS): 40.9 % (ref 18–48)
RELATIVE MONOCYTE (OHS): 7.4 % (ref 4–15)
RELATIVE NEUTROPHIL (OHS): 45.9 % (ref 38–73)
SODIUM SERPL-SCNC: 142 MMOL/L (ref 136–145)
TRIGL SERPL-MCNC: 272 MG/DL (ref 30–150)
TSH SERPL-ACNC: 3.98 UIU/ML (ref 0.4–4)
WBC # BLD AUTO: 7.19 K/UL (ref 3.9–12.7)

## 2025-06-20 PROCEDURE — 36415 COLL VENOUS BLD VENIPUNCTURE: CPT | Mod: HCNC,PN

## 2025-06-20 PROCEDURE — 85025 COMPLETE CBC W/AUTO DIFF WBC: CPT | Mod: HCNC

## 2025-06-20 PROCEDURE — 83036 HEMOGLOBIN GLYCOSYLATED A1C: CPT | Mod: DBM,HCNC

## 2025-06-20 PROCEDURE — 80053 COMPREHEN METABOLIC PANEL: CPT | Mod: HCNC

## 2025-06-20 PROCEDURE — 84443 ASSAY THYROID STIM HORMONE: CPT | Mod: HCNC

## 2025-06-20 PROCEDURE — 80061 LIPID PANEL: CPT | Mod: HCNC

## 2025-06-23 DIAGNOSIS — M79.10 MYALGIA DUE TO HMG COA REDUCTASE INHIBITOR: ICD-10-CM

## 2025-06-23 DIAGNOSIS — E78.2 MIXED HYPERLIPIDEMIA: ICD-10-CM

## 2025-06-23 DIAGNOSIS — T46.6X5A MYALGIA DUE TO HMG COA REDUCTASE INHIBITOR: ICD-10-CM

## 2025-06-23 RX ORDER — EVOLOCUMAB 140 MG/ML
140 INJECTION, SOLUTION SUBCUTANEOUS
Qty: 2 ML | Refills: 2 | Status: ACTIVE | OUTPATIENT
Start: 2025-06-23 | End: 2025-09-21

## 2025-06-23 NOTE — TELEPHONE ENCOUNTER
Care Due:                  Date            Visit Type   Department     Provider  --------------------------------------------------------------------------------                                EP -                              [facility]  Last Visit: 03-      CARE ([facility])   MEDICINE       Alonso Cortez                              [facility]  Next Visit: 06-      CARE ([facility])   [facility]                                                            Last  Test          Frequency    Reason                     Performed    Due Date  --------------------------------------------------------------------------------    Mg Level....  12 months..  alendronate..............  Not Found    Overdue    Phosphate...  12 months..  alendronate..............  Not Found    Overdue    [employer] Embedded Care Due Messages. Reference number: [id number].   6/23/2025 8:43:04 AM CDT

## 2025-06-25 DIAGNOSIS — Z78.0 MENOPAUSE: ICD-10-CM

## 2025-06-27 ENCOUNTER — OFFICE VISIT (OUTPATIENT)
Dept: FAMILY MEDICINE | Facility: CLINIC | Age: 70
End: 2025-06-27
Payer: MEDICARE

## 2025-06-27 VITALS
SYSTOLIC BLOOD PRESSURE: 112 MMHG | WEIGHT: 127.88 LBS | OXYGEN SATURATION: 97 % | DIASTOLIC BLOOD PRESSURE: 70 MMHG | HEART RATE: 83 BPM | BODY MASS INDEX: 21.83 KG/M2 | HEIGHT: 64 IN | RESPIRATION RATE: 14 BRPM

## 2025-06-27 DIAGNOSIS — I10 PRIMARY HYPERTENSION: Primary | ICD-10-CM

## 2025-06-27 DIAGNOSIS — T46.6X5A MYALGIA DUE TO HMG COA REDUCTASE INHIBITOR: ICD-10-CM

## 2025-06-27 DIAGNOSIS — M79.10 MYALGIA DUE TO HMG COA REDUCTASE INHIBITOR: ICD-10-CM

## 2025-06-27 DIAGNOSIS — F43.23 ADJUSTMENT REACTION WITH ANXIETY AND DEPRESSION: ICD-10-CM

## 2025-06-27 DIAGNOSIS — E78.2 MIXED HYPERLIPIDEMIA: ICD-10-CM

## 2025-06-27 DIAGNOSIS — E03.4 HYPOTHYROIDISM DUE TO ACQUIRED ATROPHY OF THYROID: ICD-10-CM

## 2025-06-27 PROCEDURE — 99999 PR PBB SHADOW E&M-EST. PATIENT-LVL IV: CPT | Mod: PBBFAC,HCNC,, | Performed by: FAMILY MEDICINE

## 2025-06-27 RX ORDER — METOPROLOL SUCCINATE 25 MG/1
12.5 TABLET, EXTENDED RELEASE ORAL
COMMUNITY
Start: 2024-07-25

## 2025-06-27 RX ORDER — AMLODIPINE BESYLATE 5 MG/1
1 TABLET ORAL DAILY
COMMUNITY
Start: 2024-11-14 | End: 2025-11-15

## 2025-06-27 RX ORDER — POTASSIUM CHLORIDE 750 MG/1
2 CAPSULE, EXTENDED RELEASE ORAL DAILY
COMMUNITY
Start: 2024-07-25

## 2025-06-27 RX ORDER — CITALOPRAM 20 MG/1
1 TABLET ORAL DAILY
COMMUNITY
Start: 2024-10-23

## 2025-06-27 RX ORDER — LOSARTAN POTASSIUM 100 MG/1
1 TABLET ORAL DAILY
COMMUNITY
Start: 2024-10-23

## 2025-06-27 RX ORDER — CLOPIDOGREL BISULFATE 75 MG/1
1 TABLET ORAL EVERY MORNING
COMMUNITY

## 2025-06-27 RX ORDER — ASPIRIN 81 MG/1
1 TABLET ORAL EVERY MORNING
COMMUNITY

## 2025-06-27 NOTE — PROGRESS NOTES
Chief Complaint:    Chief Complaint   Patient presents with    Follow-up       History of Present Illness:    History of Present Illness    Patient presents today for follow up of cholesterol management She discontinued cholesterol injection approximately 3 months ago due to family losses, not due to side effects. She verbalizes understanding of need to restart medication and follow recommended lifestyle modifications. She reports ongoing efforts to modify diet by reducing processed foods, carbohydrates, and crackers. She demonstrates understanding of nutritional guidance and is actively making dietary changes, showing increased awareness of food label contents and potential health impacts of processed foods. She continues follow-up with vascular specialist for carotid stenosis with anticipated follow up in 6 months. She continues Celexa for management of depression and anxiety. She endorses feeling tired. She quit smoking 6 months ago and remains committed to smoking cessation, implementing a strict no-smoking policy in her new car.      ROS:  General: denies fever, denies chills, admits fatigue, denies weight gain, denies weight loss, denies loss of appetite  Eyes: denies vision changes, denies blurry vision, denies eye pain, denies eye discharge  ENT: denies ear pain, denies hearing loss, denies tinnitus, denies nasal congestion, denies sore throat  Cardiovascular: denies chest pain, denies palpitations, denies lower extremity edema  Respiratory: denies cough, denies shortness of breath, denies wheezing, denies sputum production  Endocrine: denies polyuria, denies polydipsia, denies heat intolerance, denies cold intolerance  Gastrointestinal: denies abdominal pain, denies heartburn, denies nausea, denies vomiting, denies diarrhea, denies constipation, denies blood in stool  Genitourinary: denies dysuria, denies urgency, denies frequency, denies hematuria, denies nocturia, denies incontinence  Heme & Lymphatic:  denies easy or excessive bleeding, denies easy bruising, denies swollen lymph nodes  Musculoskeletal: denies muscle pain, denies back pain, denies joint pain, denies joint swelling  Skin: denies rash, denies lesion, denies itching, denies skin texture changes, denies skin color changes  Neurological: denies headache, denies dizziness, denies numbness, denies tingling, denies seizure activity, denies speech difficulty, denies memory loss, denies confusion  Psychiatric: denies anxiety, admits depression, denies sleep difficulty           Past Medical History:   Diagnosis Date    Arthritis     Hyperlipidemia     Hypertension     Osteoporosis        Social History:  Social History     Socioeconomic History    Marital status:    Tobacco Use    Smoking status: Former     Current packs/day: 0.50     Average packs/day: 0.5 packs/day for 45.0 years (22.5 ttl pk-yrs)     Types: Cigarettes    Smokeless tobacco: Never    Tobacco comments:     Hold midnight   Substance and Sexual Activity    Alcohol use: Never    Drug use: No    Sexual activity: Never     Partners: Male     Birth control/protection: None     Social Drivers of Health     Financial Resource Strain: Low Risk  (1/4/2024)    Overall Financial Resource Strain (CARDIA)     Difficulty of Paying Living Expenses: Not hard at all   Food Insecurity: No Food Insecurity (1/4/2024)    Hunger Vital Sign     Worried About Running Out of Food in the Last Year: Never true     Ran Out of Food in the Last Year: Never true   Transportation Needs: No Transportation Needs (1/4/2024)    PRAPARE - Transportation     Lack of Transportation (Medical): No     Lack of Transportation (Non-Medical): No   Physical Activity: Inactive (1/4/2024)    Exercise Vital Sign     Days of Exercise per Week: 0 days     Minutes of Exercise per Session: 0 min   Stress: No Stress Concern Present (1/4/2024)    [employer] - Occupational Stress Questionnaire     Feeling of  "Stress : Only a little   Housing Stability: Low Risk  (1/4/2024)    Housing Stability Vital Sign     Unable to Pay for Housing in the Last Year: No     Number of Places Lived in the Last Year: 1     Unstable Housing in the Last Year: No       Family History:   family history includes Breast cancer in her paternal aunt.    Health Maintenance   Topic Date Due    Shingles Vaccine (1 of 2) Never done    RSV Vaccine (Age 60+ and Pregnant patients) (1 - Risk 60-74 years 1-dose series) Never done    COVID-19 Vaccine (3 - 2024-25 season) 09/01/2024    DEXA Scan  09/22/2024    Mammogram  08/13/2025    Influenza Vaccine (Season Ended) 09/01/2025    Lipid Panel  06/20/2026    Aspirin/Antiplatelet Therapy  06/27/2026    Colorectal Cancer Screening  01/06/2028    TETANUS VACCINE  02/26/2028    Hepatitis C Screening  Completed    Pneumococcal Vaccines (Age 50+)  Completed       Exam:Physical     Vital Signs  Pulse: 83  Resp: 14  SpO2: 97 %  BP: 112/70  BP Location: Right arm  Patient Position: Sitting  Pain Score: 0-No pain  Height and Weight  Height: 5' 4" (162.6 cm)  Weight: 58 kg (127 lb 13.9 oz)  BSA (Calculated - sq m): 1.62 sq meters  BMI (Calculated): 21.9  Weight in (lb) to have BMI = 25: 145.3]    Body mass index is 21.95 kg/m².    Physical Exam    General: Well-developed. Well-nourished. No acute distress.  Eyes: EOMI. Sclerae anicteric.  HENT: Normocephalic. Atraumatic. Nares patent. Moist oral mucosa.  Cardiovascular: Regular rate. Regular rhythm. No murmurs. No rubs. No gallops. Normal S1, S2.  Respiratory: Normal respiratory effort. Clear to auscultation bilaterally. No rales. No rhonchi. No wheezing.  Musculoskeletal: No  obvious deformity.  Extremities: No lower extremity edema.  Neurological: Alert & oriented x3. No slurred speech. Normal gait.  Psychiatric: Normal mood. Normal affect. Good insight. Good judgment.  Skin: Warm. Dry. No rash.           Assessment:        ICD-10-CM ICD-9-CM   1. Primary hypertension "  I10 401.9   2. Mixed hyperlipidemia  E78.2 272.2   3. 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 Hypothyroidism due to acquired atrophy of thyroid  E03.4 244.8     246.8   5. Adjustment reaction with anxiety and depression  F43.23 309.28         Plan:    Assessment & Plan    MEDICAL DECISION MAKING:  - Cholesterol levels increased by 100 points after discontinuing medication for 3 months.  - Thyroid function normalized with current medication.  - Liver and renal function tests normal.  - Recent smoking cessation noted as positive factor in overall health management.  - Under care of vascular doctor for carotid stenosis.    PATIENT EDUCATION:  - Explained impact of processed foods, particularly crackers, on cholesterol and glucose levels.  - Discussed benefits of fresh produce and minimally cooked foods for better health.    ACTION ITEMS/LIFESTYLE:  - Patient to follow recommended diet plan, focusing on reducing processed foods and increasing fresh produce.  - Recommend engaging in daily walking for exercise.  - Patient to maintain smoking cessation.    MEDICATIONS:  - Started Repatha (cholesterol medication injection) to resume cholesterol management.  - Continued thyroid medication at current dose.  - Continued Celexa for depression and anxiety.    ORDERS:  - Ordered bone density scan scheduled for July.    FOLLOW UP:  - Follow up in 6 months.  - Follow up in August for mammogram.  - Contact the office to obtain shingles shot, RSV shot, and COVID shot at the pharmacy.         Emily was seen today for follow-up.    Diagnoses and all orders for this visit:    Primary hypertension  -     Lipid Panel; Future  -     Comprehensive Metabolic Panel; Future    Mixed hyperlipidemia    Myalgia due to HMG CoA reductase inhibitor  -     Lipid Panel; Future    Hypothyroidism due to acquired atrophy of thyroid  -     TSH; Future    Adjustment reaction with anxiety and  depression        Follow up in about 6 months (around 12/27/2025).      Alonso Cortez MD

## 2025-07-08 ENCOUNTER — APPOINTMENT (OUTPATIENT)
Dept: RADIOLOGY | Facility: HOSPITAL | Age: 70
End: 2025-07-08
Attending: FAMILY MEDICINE
Payer: MEDICARE

## 2025-07-08 DIAGNOSIS — Z78.0 MENOPAUSE: ICD-10-CM

## 2025-07-08 PROCEDURE — 77080 DXA BONE DENSITY AXIAL: CPT | Mod: TC,HCNC

## 2025-07-08 PROCEDURE — 77080 DXA BONE DENSITY AXIAL: CPT | Mod: 26,HCNC,, | Performed by: RADIOLOGY

## 2025-07-21 RX ORDER — METOPROLOL SUCCINATE 25 MG/1
12.5 TABLET, EXTENDED RELEASE ORAL
Qty: 45 TABLET | Refills: 3 | Status: SHIPPED | OUTPATIENT
Start: 2025-07-21

## 2025-07-21 RX ORDER — POTASSIUM CHLORIDE 750 MG/1
20 CAPSULE, EXTENDED RELEASE ORAL
Qty: 180 CAPSULE | Refills: 3 | Status: SHIPPED | OUTPATIENT
Start: 2025-07-21

## 2025-07-21 RX ORDER — ALENDRONATE SODIUM 70 MG/1
70 TABLET ORAL
Qty: 12 TABLET | Refills: 3 | Status: SHIPPED | OUTPATIENT
Start: 2025-07-21

## 2025-07-21 NOTE — TELEPHONE ENCOUNTER
Refill Routing Note   Medication(s) are not appropriate for processing by [facility] for the following reason(s):        Outside of protocol    [facility] action(s):  Approve  Route               Appointments  past 12m or future 3m with PCP    Date Provider   Last Visit   6/27/2025 Alonso Cortez MD   Next Visit   1/2/2026 Alonso Cortez MD   ED visits in past 90 days: 0        Note composed:3:26 PM 07/21/2025

## 2025-07-21 NOTE — TELEPHONE ENCOUNTER
Hospitalist at bedside.    No care due was identified.  [facility] Embedded Care Due Messages. Reference number: [id number].   7/21/2025 8:05:00 AM CDT

## 2025-09-05 ENCOUNTER — OFFICE VISIT (OUTPATIENT)
Dept: RHEUMATOLOGY | Facility: CLINIC | Age: 70
End: 2025-09-05
Payer: MEDICARE

## 2025-09-05 DIAGNOSIS — Z51.81 MEDICATION MONITORING ENCOUNTER: ICD-10-CM

## 2025-09-05 DIAGNOSIS — M81.0 AGE-RELATED OSTEOPOROSIS WITHOUT CURRENT PATHOLOGICAL FRACTURE: Primary | ICD-10-CM

## 2025-09-05 RX ORDER — ALENDRONATE SODIUM 70 MG/1
70 TABLET ORAL
Qty: 12 TABLET | Refills: 3 | Status: SHIPPED | OUTPATIENT
Start: 2025-09-05

## (undated) DEVICE — KIT SHUNT ARTERY 6
Type: IMPLANTABLE DEVICE | Site: CAROTID | Status: NON-FUNCTIONAL
Removed: 2024-05-30

## (undated) DEVICE — PAD CURAD NONADH 3X4IN

## (undated) DEVICE — SUT SILK 3-0 STRANDS 30IN

## (undated) DEVICE — SYR 1CC TB SG 27GX1/2

## (undated) DEVICE — APPLICATOR CHLORAPREP ORN 26ML

## (undated) DEVICE — SOL NACL 0.9% INJ 500ML BG

## (undated) DEVICE — BOOT SUTURE AID

## (undated) DEVICE — BLADE SCALP OPHTL RND TIP

## (undated) DEVICE — SUT MCRYL PLUS 4-0 PS2 27IN

## (undated) DEVICE — TOWEL OR DISP STRL BLUE 4/PK

## (undated) DEVICE — PACK BASIC SETUP SC BR

## (undated) DEVICE — ELECTRODE REM PLYHSV RETURN 9

## (undated) DEVICE — COVER LIGHT HANDLE 80/CA

## (undated) DEVICE — LOOP VESSEL BLUE MINI 2/CARD

## (undated) DEVICE — SET EXTENSION STERILE 30IN

## (undated) DEVICE — CATH IV 20GAX1.25 JELCO

## (undated) DEVICE — SUT VICRYL 2-0 27 CT-1

## (undated) DEVICE — TOWEL COTTON SURG WHT 27X17IN

## (undated) DEVICE — SYR LUER LOCK STERILE 10ML

## (undated) DEVICE — CLIP LIGATING TITANIUM SMALL

## (undated) DEVICE — DRAPE THREE-QTR REINF 53X77IN

## (undated) DEVICE — SUT PROLENE 6-0 BV-1 30IN

## (undated) DEVICE — CLIP LIGACLIP XTRA TITANIUM

## (undated) DEVICE — GAUZE SPONGE PEANUT STRL

## (undated) DEVICE — SYR ONLY LUER LOCK 20CC

## (undated) DEVICE — SPONGE COTTON TRAY 4X4IN

## (undated) DEVICE — SUT VICRYL 3-0 27 SH

## (undated) DEVICE — DRESSING TRANS 4X4 TEGADERM

## (undated) DEVICE — DRAPE INSTR MAGNETIC 10X16IN

## (undated) DEVICE — DRAPE INCISE IOBAN 2 13X13IN

## (undated) DEVICE — APPLIER CLIP LIAGCLIP 9.375IN

## (undated) DEVICE — GOWN POLY REINF BRTH SLV XL

## (undated) DEVICE — LOOP VESSEL MAXI RED

## (undated) DEVICE — APPLIER LIGACLIP SM 9.38IN

## (undated) DEVICE — DRAPE THYROID SOFT STERILE

## (undated) DEVICE — SUT SILK 2-0 STRANDS 30IN

## (undated) DEVICE — MANIFOLD 4 PORT

## (undated) DEVICE — HEMOSTAT SURGICEL 4X8IN

## (undated) DEVICE — SPONGE LAP 18X18 PREWASHED